# Patient Record
Sex: FEMALE | Race: WHITE | NOT HISPANIC OR LATINO | Employment: FULL TIME | URBAN - METROPOLITAN AREA
[De-identification: names, ages, dates, MRNs, and addresses within clinical notes are randomized per-mention and may not be internally consistent; named-entity substitution may affect disease eponyms.]

---

## 2017-06-27 DIAGNOSIS — J30.9 ALLERGIC RHINITIS: ICD-10-CM

## 2017-06-27 RX ORDER — FLUTICASONE PROPIONATE 50 MCG
SPRAY, SUSPENSION (ML) NASAL
Qty: 48 G | Refills: 0 | Status: SHIPPED | OUTPATIENT
Start: 2017-06-27 | End: 2018-06-20

## 2017-06-27 NOTE — TELEPHONE ENCOUNTER
30 day supply given.  Patient is due for an OV.  Please call and assist with scheduling appointment prior to next refill   Vanessa Aquino RN  [employer]

## 2017-06-27 NOTE — TELEPHONE ENCOUNTER
Flonase      Last Written Prescription Date: 4/20/2016  Last Fill Quantity: 48,  # refills: 2   Last Office Visit with [facility], [facility] or [facility] prescribing provider: 4/20/2016

## 2017-07-05 ENCOUNTER — HOSPITAL ENCOUNTER (OUTPATIENT)
Dept: MAMMOGRAPHY | Facility: CLINIC | Age: 42
Discharge: HOME | End: 2017-07-05
Attending: FAMILY MEDICINE | Admitting: FAMILY MEDICINE
Payer: COMMERCIAL

## 2017-07-05 DIAGNOSIS — Z12.31 VISIT FOR SCREENING MAMMOGRAM: ICD-10-CM

## 2017-07-05 PROCEDURE — G0202 SCR MAMMO BI INCL CAD: HCPCS

## 2017-07-06 NOTE — PROGRESS NOTES
Dear Laure,    Your recent mammogram was normal. Annual mammograms are recommended, so you will be due again in July 2018.  You may receive a separate result letter in the mail from the imaging center.    Please contact the clinic if you have additional questions.  Thank you.    Sincerely,    Fatimah Chen PA-C  Physician extender for Dr. Karen Weiler

## 2017-07-13 ENCOUNTER — OFFICE VISIT (OUTPATIENT)
Dept: FAMILY MEDICINE | Facility: CLINIC | Age: 42
End: 2017-07-13
Payer: COMMERCIAL

## 2017-07-13 VITALS
WEIGHT: 169 LBS | HEART RATE: 80 BPM | OXYGEN SATURATION: 100 % | RESPIRATION RATE: 12 BRPM | HEIGHT: 66 IN | BODY MASS INDEX: 27.16 KG/M2 | SYSTOLIC BLOOD PRESSURE: 110 MMHG | DIASTOLIC BLOOD PRESSURE: 70 MMHG | TEMPERATURE: 98.2 F

## 2017-07-13 DIAGNOSIS — N94.6 DYSMENORRHEA: ICD-10-CM

## 2017-07-13 DIAGNOSIS — G89.29 CHRONIC MIDLINE LOW BACK PAIN WITHOUT SCIATICA: ICD-10-CM

## 2017-07-13 DIAGNOSIS — M54.50 CHRONIC MIDLINE LOW BACK PAIN WITHOUT SCIATICA: ICD-10-CM

## 2017-07-13 DIAGNOSIS — Z00.00 ROUTINE GENERAL MEDICAL EXAMINATION AT A HEALTH CARE FACILITY: Primary | ICD-10-CM

## 2017-07-13 PROCEDURE — 99396 PREV VISIT EST AGE 40-64: CPT | Performed by: FAMILY MEDICINE

## 2017-07-13 RX ORDER — NAPROXEN 500 MG/1
500 TABLET ORAL 2 TIMES DAILY PRN
Qty: 60 TABLET | Refills: 1 | Status: SHIPPED | OUTPATIENT
Start: 2017-07-13 | End: 2018-07-18

## 2017-07-13 RX ORDER — CYCLOBENZAPRINE HCL 10 MG
5-10 TABLET ORAL 3 TIMES DAILY PRN
Qty: 30 TABLET | Refills: 1 | Status: SHIPPED | OUTPATIENT
Start: 2017-07-13 | End: 2018-07-18

## 2017-07-13 RX ORDER — NORETHINDRONE ACETATE AND ETHINYL ESTRADIOL 1MG-20(21)
1 KIT ORAL DAILY
Qty: 84 TABLET | Refills: 3 | Status: SHIPPED | OUTPATIENT
Start: 2017-07-13 | End: 2018-04-16

## 2017-07-13 ASSESSMENT — ANXIETY QUESTIONNAIRES
2. NOT BEING ABLE TO STOP OR CONTROL WORRYING: NOT AT ALL
5. BEING SO RESTLESS THAT IT IS HARD TO SIT STILL: NOT AT ALL
IF YOU CHECKED OFF ANY PROBLEMS ON THIS QUESTIONNAIRE, HOW DIFFICULT HAVE THESE PROBLEMS MADE IT FOR YOU TO DO YOUR WORK, TAKE CARE OF THINGS AT HOME, OR GET ALONG WITH OTHER PEOPLE: SOMEWHAT DIFFICULT
7. FEELING AFRAID AS IF SOMETHING AWFUL MIGHT HAPPEN: NOT AT ALL
1. FEELING NERVOUS, ANXIOUS, OR ON EDGE: SEVERAL DAYS
6. BECOMING EASILY ANNOYED OR IRRITABLE: SEVERAL DAYS
GAD7 TOTAL SCORE: 3
3. WORRYING TOO MUCH ABOUT DIFFERENT THINGS: NOT AT ALL

## 2017-07-13 ASSESSMENT — PATIENT HEALTH QUESTIONNAIRE - PHQ9: 5. POOR APPETITE OR OVEREATING: SEVERAL DAYS

## 2017-07-13 NOTE — MR AVS SNAPSHOT
After Visit Summary   7/13/2017    Laure Mendez    MRN: [id number]           Patient Information     Date Of Birth          1975        Visit Information        Provider Department      7/13/2017 1:20 PM Weiler, Karen, MD [facility]        Today's Diagnoses     Routine general medical examination at a health care facility    -  1    Allergic rhinitis        Dysmenorrhea        Chronic midline low back pain without sciatica          Care Instructions      Preventive Health Recommendations  Female Ages 40 to 49    Yearly exam:     See your health care provider every year in order to  1. Review health changes.   2. Discuss preventive care.    3. Review your medicines if your doctor prescribed any.      Get a Pap test every three years (unless you have an abnormal result and your provider advises testing more often).      If you get Pap tests with HPV test, you only need to test every 5 years, unless you have an abnormal result. You do not need a Pap test if your uterus was removed (hysterectomy) and you have not had cancer.      You should be tested each year for STDs (sexually transmitted diseases), if you're at risk.       Ask your doctor if you should have a mammogram.      Have a colonoscopy (test for colon cancer) if someone in your family has had colon cancer or polyps before age 50.       Have a cholesterol test every 5 years.       Have a diabetes test (fasting glucose) after age 45. If you are at risk for diabetes, you should have this test every 3 years.    Shots: Get a flu shot each year. Get a tetanus shot every 10 years.     Nutrition:     Eat at least 5 servings of fruits and vegetables each day.    Eat whole-grain bread, whole-wheat pasta and brown rice instead of white grains and rice.    Talk to your provider about Calcium and Vitamin D.     Lifestyle    Exercise at least 150 minutes a week (an average of 30 minutes a day, 5 days a week). This will help you control  "your weight and prevent disease.    Limit alcohol to one drink per day.    No smoking.     Wear sunscreen to prevent skin cancer.    See your dentist every six months for an exam and cleaning.          Follow-ups after your visit        Who to contact     If you have questions or need follow up information about today's clinic visit or your schedule please contact [facility] [place] directly at [phone].  Normal or non-critical lab and imaging results will be communicated to you by [employer], letter or phone within 4 business days after the clinic has received the results. If you do not hear from us within 7 days, please contact the clinic through [employer] or phone. If you have a critical or abnormal lab result, we will notify you by phone as soon as possible.  Submit refill requests through [employer] or call your pharmacy and they will forward the refill request to us. Please allow 3 business days for your refill to be completed.          Additional Information About Your Visit        [employer] Information     [employer] gives you secure access to your electronic health record. If you see a primary care provider, you can also send messages to your care team and make appointments. If you have questions, please call your primary care clinic.  If you do not have a primary care provider, please call [phone] and they will assist you.        Care EveryWhere ID     This is your Care EveryWhere ID. This could be used by other organizations to access your [place] medical records  [id number]        Your Vitals Were     Pulse Temperature Respirations Height Last Period Pulse Oximetry    80 98.2  F (36.8  C) (Tympanic) 12 5' 5.5\" (1.664 m) 06/29/2017 100%    Breastfeeding? BMI (Body Mass Index)                No 27.7 kg/m2           Blood Pressure from Last 3 Encounters:   07/13/17 110/70   04/20/16 114/68   01/29/16 100/80    Weight from Last 3 Encounters:   07/13/17 169 lb (76.7 kg)   04/20/16 166 lb (75.3 kg) "   01/29/16 171 lb 9.6 oz (77.8 kg)              Today, you had the following     No orders found for display         Today's Medication Changes          These changes are accurate as of: 7/13/17  2:14 PM.  If you have any questions, ask your nurse or doctor.               Start taking these medicines.        Dose/Directions    norethindrone-ethinyl estradiol 1-20 MG-MCG per tablet   Commonly known as:  JUNEL FE 1/20   Used for:  Dysmenorrhea   Started by:  Weiler, Karen, MD        Dose:  1 tablet   Take 1 tablet by mouth daily   Quantity:  84 tablet   Refills:  3            Where to get your medicines      These medications were sent to [facility] [address]  [facility] [address] [address]     Phone:  [phone]     cyclobenzaprine 10 MG tablet    naproxen 500 MG tablet    norethindrone-ethinyl estradiol 1-20 MG-MCG per tablet                Primary Care Provider Office Phone # Fax #    Karen Weiler, [phone]       [facility] [address] [facility] [address]        Equal Access to Services     [facility] AH: Hadii aad ku hadasho Soomaali, waaxda luqadaha, qaybta kaalmada adeegyada, waxay idiin hayaan aderemberto kharaaleah ladayna . So [facility] [phone].    ATENCIÓN: Si habla español, tiene a granger disposición servicios gratuitos de asistencia lingüística. Llame al [phone].    We comply with applicable federal civil rights laws and [place] laws. We do not discriminate on the basis of race, color, national origin, age, disability sex, sexual orientation or gender identity.            Thank you!     Thank you for choosing [facility]  for your care. Our goal is always to provide you with excellent care. Hearing back from our patients is one way we can continue to improve our services. Please take a few minutes to complete the written survey that you may receive in the mail after your visit with us. Thank you!              Your Updated Medication List - Protect others around you: Learn how to safely use, store and throw away your medicines at www.disposemymeds.org.          This list is accurate as of: 7/13/17  2:14 PM.  Always use your most recent med list.                   Brand Name Dispense Instructions for use Diagnosis    cyclobenzaprine 10 MG tablet    FLEXERIL    30 tablet    Take 0.5-1 tablets (5-10 mg) by mouth 3 times daily as needed for muscle spasms    Dysmenorrhea       fluticasone 50 MCG/ACT spray    FLONASE    48 g    USE TWO SPRAYS IN EACH NOSTRIL EVERY DAY    Allergic rhinitis       hydrocortisone 0.2 % cream    WESTCORT    60 g    Apply  topically 3 times daily as needed. Apply sparingly to affected area.  Do not apply to face.    Eczema       naproxen 500 MG tablet    NAPROSYN    60 tablet    Take 1 tablet (500 mg) by mouth 2 times daily as needed for moderate pain    Dysmenorrhea       norethindrone-ethinyl estradiol 1-20 MG-MCG per tablet    JUNEL FE 1/20    84 tablet    Take 1 tablet by mouth daily    Dysmenorrhea

## 2017-07-13 NOTE — NURSING NOTE
"Chief Complaint   Patient presents with     Physical       Initial /70  Pulse 80  Temp 98.2  F (36.8  C) (Tympanic)  Resp 12  Ht 5' 5.5\" (1.664 m)  Wt 169 lb (76.7 kg)  LMP 06/29/2017  SpO2 100%  Breastfeeding? No  BMI 27.7 kg/m2 Estimated body mass index is 27.7 kg/(m^2) as calculated from the following:    Height as of this encounter: 5' 5.5\" (1.664 m).    Weight as of this encounter: 169 lb (76.7 kg).  Medication Reconciliation: complete   Abigail Bloedow LPN    "

## 2017-07-13 NOTE — PROGRESS NOTES
SUBJECTIVE:   CC: Laure Mendez is an 42 year old woman who presents for preventive health visit.     Physical   Annual:     Getting at least 3 servings of Calcium per day::  Yes    Bi-annual eye exam::  Yes    Dental care twice a year::  Yes    Sleep apnea or symptoms of sleep apnea::  None    Diet::  Regular (no restrictions)    Frequency of exercise::  None    Taking medications regularly::  Yes    Medication side effects::  Not applicable    Additional concerns today::  YES    Anxiety Follow-Up    Status since last visit: No change    Other associated symptoms:None    Complicating factors:   Significant life event: Yes-  Rental property,  Current substance abuse: None  Depression symptoms: No  CRISTINA-7 SCORE 8/6/2014 8/13/2015 7/13/2017   Total Score 1 2 -   Total Score - - -   Total Score - - 3     GAD7    Today's PHQ-2 Score:   PHQ-2 ( 1999 [employer]) 7/6/2017   Q1: Little interest or pleasure in doing things 1   Q2: Feeling down, depressed or hopeless 0   PHQ-2 Score 1   Q1: Little interest or pleasure in doing things Several days   Q2: Feeling down, depressed or hopeless Not at all   PHQ-2 Score 1     Abuse: Current or Past(Physical, Sexual or Emotional)- No  Do you feel safe in your environment - Yes    Social History   Substance Use Topics     Smoking status: Never Smoker     Smokeless tobacco: Never Used     Alcohol use Yes      Comment: every day 1-2 drinks      The patient does not drink >3 drinks per day nor >7 drinks per week.    Reviewed orders with patient.  Reviewed health maintenance and updated orders accordingly - Yes    Patient under age 50, mutual decision reflected in health maintenance.      Pertinent mammograms are reviewed under the imaging tab.  History of abnormal Pap smear: NO - age 30- 65 PAP every 3 years recommended      Pt is struggling with stress and anxiety. Work is very stressful. Is still struggling with fertility issues.  No SI/HI.     Is having heavy periods.  Feels like she  "gets more emotional with her menses. Regular menses.  Wonders what options she has. Not sure if she wants definitive treatment done.          Reviewed and updated as needed this visit by clinical staff       Reviewed and updated as needed this visit by Provider         ROS:  C: NEGATIVE for fever, chills, change in weight  I: NEGATIVE for worrisome rashes, moles or lesions  E: NEGATIVE for vision changes or irritation  ENT: NEGATIVE for ear, mouth and throat problems  R: NEGATIVE for significant cough or SOB  B: NEGATIVE for masses, tenderness or discharge  CV: NEGATIVE for chest pain, palpitations or peripheral edema  GI: NEGATIVE for nausea, abdominal pain, heartburn, or change in bowel habits  : NEGATIVE for unusual urinary or vaginal symptoms. Periods are regular.  M: NEGATIVE for significant arthralgias or myalgia  N: NEGATIVE for weakness, dizziness or paresthesias  P: NEGATIVE for changes in mood or affect     OBJECTIVE:   /70  Pulse 80  Temp 98.2  F (36.8  C) (Tympanic)  Resp 12  Ht 5' 5.5\" (1.664 m)  Wt 169 lb (76.7 kg)  LMP 06/29/2017  SpO2 100%  Breastfeeding? No  BMI 27.7 kg/m2  EXAM:  GENERAL: healthy, alert and no distress  EYES: Eyes grossly normal to inspection, PERRL and conjunctivae and sclerae normal  HENT: ear canals and TM's normal, nose and mouth without ulcers or lesions  NECK: no adenopathy, no asymmetry, masses, or scars and thyroid normal to palpation  RESP: lungs clear to auscultation - no rales, rhonchi or wheezes  BREAST: normal without masses, tenderness or nipple discharge and no palpable axillary masses or adenopathy  CV: regular rate and rhythm, normal S1 S2, no S3 or S4, no murmur, click or rub, no peripheral edema and peripheral pulses strong  ABDOMEN: soft, nontender, no hepatosplenomegaly, no masses and bowel sounds normal  MS: no gross musculoskeletal defects noted, no edema  SKIN: no suspicious lesions or rashes  NEURO: Normal strength and tone, mentation " "intact and speech normal  PSYCH: mentation appears normal, affect normal/bright    ASSESSMENT/PLAN:       ICD-10-CM    1. Routine general medical examination at a health care facility Z00.00 **CBC with platelets FUTURE 6mo     **Lipid panel reflex to direct LDL FUTURE 6mo     **Basic metabolic panel FUTURE 6mo     **TSH with free T4 reflex FUTURE 6mo   2. Allergic rhinitis J30.9    3. Dysmenorrhea N94.6 naproxen (NAPROSYN) 500 MG tablet     norethindrone-ethinyl estradiol (JUNEL FE 1/20) 1-20 MG-MCG per tablet   4. Chronic midline low back pain without sciatica M54.5 cyclobenzaprine (FLEXERIL) 10 MG tablet    G89.29          COUNSELING:  Reviewed preventive health counseling, as reflected in patient instructions       Regular exercise       Healthy diet/nutrition       Contraception       Family planning     reports that she has never smoked. She has never used smokeless tobacco.    Estimated body mass index is 27.2 kg/(m^2) as calculated from the following:    Height as of 4/20/16: 1.664 m (5' 5.5\").    Weight as of 4/20/16: 75.3 kg (166 lb).   Weight management plan: Discussed healthy diet and exercise guidelines and patient will follow up in 12 months in clinic to re-evaluate.    Counseling Resources:  ATP IV Guidelines  Pooled Cohorts Equation Calculator  Breast Cancer Risk Calculator  FRAX Risk Assessment  ICSI Preventive Guidelines  Dietary Guidelines for Americans, 2010  USDA's MyPlate  ASA Prophylaxis  Lung CA Screening    Karen Weiler, MD  [facility] [place]  Answers for HPI/ROS submitted by the patient on 7/6/2017   PHQ-2 Score: 1    "

## 2017-07-14 ASSESSMENT — PATIENT HEALTH QUESTIONNAIRE - PHQ9: SUM OF ALL RESPONSES TO PHQ QUESTIONS 1-9: 4

## 2017-07-14 ASSESSMENT — ANXIETY QUESTIONNAIRES: GAD7 TOTAL SCORE: 3

## 2017-09-17 DIAGNOSIS — J30.9 ALLERGIC RHINITIS: ICD-10-CM

## 2017-09-18 RX ORDER — FLUTICASONE PROPIONATE 50 MCG
SPRAY, SUSPENSION (ML) NASAL
Qty: 48 G | Refills: 2 | Status: SHIPPED | OUTPATIENT
Start: 2017-09-18 | End: 2018-07-18

## 2017-09-18 NOTE — TELEPHONE ENCOUNTER
Prescription approved per [facility] Refill Protocol.  Geovanna Red, RN, BSN  [facility]

## 2017-09-18 NOTE — TELEPHONE ENCOUNTER
fluticasone (FLONASE) 50 MCG/ACT spray      Last Written Prescription Date: 6/27/2017  Last Fill Quantity: 48 g,  # refills: 0   Last Office Visit with [facility], [facility] or [facility] prescribing provider: 7/13/2017

## 2017-10-11 ENCOUNTER — MYC MEDICAL ADVICE (OUTPATIENT)
Dept: FAMILY MEDICINE | Facility: CLINIC | Age: 42
End: 2017-10-11

## 2017-11-02 ENCOUNTER — HOSPITAL ENCOUNTER (EMERGENCY)
Facility: CLINIC | Age: 42
Discharge: HOME | End: 2017-11-03
Attending: EMERGENCY MEDICINE | Admitting: EMERGENCY MEDICINE
Payer: COMMERCIAL
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
## 2017-11-02 DIAGNOSIS — S61.411A LACERATION OF RIGHT HAND WITHOUT FOREIGN BODY, INITIAL ENCOUNTER: ICD-10-CM

## 2017-11-02 PROCEDURE — 99284 EMERGENCY DEPT VISIT MOD MDM: CPT | Mod: 25

## 2017-11-02 PROCEDURE — 64450 NJX AA&/STRD OTHER PN/BRANCH: CPT

## 2017-11-02 RX ORDER — AMPICILLIN AND SULBACTAM 2; 1 G/1; G/1
3 INJECTION, POWDER, FOR SOLUTION INTRAMUSCULAR; INTRAVENOUS ONCE
Status: COMPLETED | OUTPATIENT
Start: 2017-11-02 | End: 2017-11-03

## 2017-11-02 ASSESSMENT — ENCOUNTER SYMPTOMS: WOUND: 1

## 2017-11-02 NOTE — ED AVS SNAPSHOT
[facility] Emergency Department    [address]    [facility] [address]    Phone:  [phone]    Fax:  [phone]                                       Laure Mendez   MRN: [id number]    Department:  [facility] Emergency Department   Date of Visit:  11/2/2017           After Visit Summary Signature Page     I have received my discharge instructions, and my questions have been answered. I have discussed any challenges I see with this plan with the nurse or doctor.    ..........................................................................................................................................  Patient/Patient Representative Signature      ..........................................................................................................................................  Patient Representative Print Name and Relationship to Patient    ..................................................               ................................................  Date                                            Time    ..........................................................................................................................................  Reviewed by Signature/Title    ...................................................              ..............................................  Date                                                            Time

## 2017-11-02 NOTE — ED AVS SNAPSHOT
[facility] Emergency Department    [address]    [facility] [address]    Phone:  [phone]    Fax:  [phone]                                       Laure Mendez   MRN: [id number]    Department:  [facility] Emergency Department   Date of Visit:  11/2/2017           Patient Information     Date Of Birth          1975        Your diagnoses for this visit were:     Dog bite, initial encounter     Laceration of right hand without foreign body, initial encounter        You were seen by Marisa Carreno MD.      Follow-up Information     Follow up with Francois Nichols MD. Schedule an appointment as soon as possible for a visit in 3 days.    Specialty:  Orthopedics    Why:  for follow-up of dog bite, call tomorrow to make appointment     Contact information:    [facility]  [address] [place] [address]  [phone]          Go to [facility] Emergency Department.    Specialty:  EMERGENCY MEDICINE    Why:  for redness, swelling, or drainage from wounds, return to ED immediately    Contact information:    [address] [facility] [address]  [phone]        Discharge Instructions         Dog Bite  A dog bite can cause a wound deep enough to break the skin. In such cases, the wound is cleaned and sometimes closed. If the wound is closed, it is usually not completely closed. This is so that fluid can drain if the wound becomes infected. Often, wounds will be left open to heal. In addition to wound care, a tetanus shot may be given, if needed.    Home care    Wash your hands well with soap and warm water before and after caring for the wound. This helps lower the risk of infection.    Care for the wound as directed. If a dressing was applied to the wound, be sure to change it as directed.    If the wound bleeds, place a clean, soft cloth on the wound. Then firmly apply pressure until the bleeding stops. This may take  up to 5 minutes. Do not release the pressure and look at the wound during this time.    Most wounds heal within 10 days. But an infection can occur even with proper treatment. So be sure to check the wound daily for signs of infection (see below).    Antibiotics may be prescribed. These help prevent or treat infection. If you re given antibiotics, take them as directed. Also be sure to complete the medicines.  Rabies prevention  Rabies is a virus that can be carried in certain animals. These can include domestic animals such as dogs and cats. Pets fully vaccinated against rabies (2 shots) are at very low risk of infection. But because human rabies is almost always fatal, any biting pet should be confined for 10 days as an extra precaution. In general, if there is a risk for rabies, the following steps may need to be taken:    If someone s pet dog has bitten you, it should be kept in a secure area for the next 10 days to watch for signs of illness. (If the pet owner won t allow this, contact your local animal control center.) If the dog becomes ill or dies during that time, contact your local animal control center at once so the animal may be tested for rabies. If the dog stays healthy for the next 10 days, there is no danger of rabies in the animal or you.    If a stray dog bit you, contact your local animal control center. They can give information on capture, quarantine, and animal rabies testing.    If you can t find the animal that bit you in the next 2 days, and if rabies exists in your area, you may need to receive the rabies vaccine series. Call your healthcare provider right away. Or, return to the emergency department promptly.    All animal bites should be reported to the local animal control center. If you were not given a form to fill out, you can report this yourself.  Follow-up care  Follow up with your healthcare provider, or as directed.  When to seek medical advice  Call your healthcare provider  right away if any of these occur:    Signs of infection:    Spreading redness or warmth from the wound    Increased pain or swelling    Fever of 100.4 F (38 C) or higher, or as directed by your healthcare provider    Colored fluid or pus draining from the wound    Signs of rabies infection:    Headache    Confusion    Strange behavior    Increased salivating and drooling    Seizure    Decreased ability to move any body part near the wound    Bleeding that can't be stopped after 5 minutes of firm pressure  Date Last Reviewed: 3/1/2017    5726-1158 [employer]. [address] [place] [address]. All rights reserved. This information is not intended as a substitute for professional medical care. Always follow your healthcare professional's instructions.          24 Hour Appointment Hotline       To make an appointment at any [facility] call [phone] ([phone]). If you don't have a family doctor or clinic, we will help you find one. [facility] are conveniently located to serve the needs of you and your family.             Review of your medicines      START taking        Dose / Directions Last dose taken    amoxicillin-clavulanate 875-125 MG per tablet   Commonly known as:  AUGMENTIN   Dose:  1 tablet   Quantity:  10 tablet        Take 1 tablet by mouth 2 times daily for 5 days   Refills:  0          Our records show that you are taking the medicines listed below. If these are incorrect, please call your family doctor or clinic.        Dose / Directions Last dose taken    cyclobenzaprine 10 MG tablet   Commonly known as:  FLEXERIL   Dose:  5-10 mg   Quantity:  30 tablet        Take 0.5-1 tablets (5-10 mg) by mouth 3 times daily as needed for muscle spasms   Refills:  1        * fluticasone 50 MCG/ACT spray   Commonly known as:  FLONASE   Quantity:  48 g        USE TWO SPRAYS IN EACH NOSTRIL EVERY DAY   Refills:  0        * fluticasone 50 MCG/ACT spray   Commonly known as:   FLONASE   Quantity:  48 g        USE TWO SPRAYS IN EACH NOSTRIL EVERY DAY   Refills:  2        hydrocortisone 0.2 % cream   Commonly known as:  WESTCORT   Quantity:  60 g        Apply  topically 3 times daily as needed. Apply sparingly to affected area.  Do not apply to face.   Refills:  3        naproxen 500 MG tablet   Commonly known as:  NAPROSYN   Dose:  500 mg   Quantity:  60 tablet        Take 1 tablet (500 mg) by mouth 2 times daily as needed for moderate pain   Refills:  1        norethindrone-ethinyl estradiol 1-20 MG-MCG per tablet   Commonly known as:  JUNEL FE 1/20   Dose:  1 tablet   Quantity:  84 tablet        Take 1 tablet by mouth daily   Refills:  3        * Notice:  This list has 2 medication(s) that are the same as other medications prescribed for you. Read the directions carefully, and ask your doctor or other care provider to review them with you.            Prescriptions were sent or printed at these locations (1 Prescription)                   Other Prescriptions                Printed at Department/Unit printer (1 of 1)         amoxicillin-clavulanate (AUGMENTIN) 875-125 MG per tablet                Procedures and tests performed during your visit     Hand XR, G/E 3 views, right      Orders Needing Specimen Collection     None      Pending Results     Date and Time Order Name Status Description    11/2/2017 2341 Hand XR, G/E 3 views, right Preliminary             Pending Culture Results     No orders found for last 3 day(s).            Pending Results Instructions     If you had any lab results that were not finalized at the time of your Discharge, you can call the ED Lab Result RN at [phone]. You will be contacted by this team for any positive Lab results or changes in treatment. The nurses are available 7 days a week from 10A to 6:30P.  You can leave a message 24 hours per day and they will return your call.        Test Results From Your Hospital Stay        11/3/2017  1:13 AM       Narrative     XR HAND RT G/E 3 VW  11/3/2017 1:07 AM      HISTORY: Dog bite, multiple lacerations, evaluate for foreign body or  fracture.    COMPARISON: None.        Impression     IMPRESSION: No acute fracture or dislocation. Air in the soft tissues  about the fifth metacarpal consistent with laceration. No radiopaque  foreign body.                Clinical Quality Measure: Blood Pressure Screening     Your blood pressure was checked while you were in the emergency department today. The last reading we obtained was  BP: (!) 130/92 . Please read the guidelines below about what these numbers mean and what you should do about them.  If your systolic blood pressure (the top number) is less than 120 and your diastolic blood pressure (the bottom number) is less than 80, then your blood pressure is normal. There is nothing more that you need to do about it.  If your systolic blood pressure (the top number) is 120-139 or your diastolic blood pressure (the bottom number) is 80-89, your blood pressure may be higher than it should be. You should have your blood pressure rechecked within a year by a primary care provider.  If your systolic blood pressure (the top number) is 140 or greater or your diastolic blood pressure (the bottom number) is 90 or greater, you may have high blood pressure. High blood pressure is treatable, but if left untreated over time it can put you at risk for heart attack, stroke, or kidney failure. You should have your blood pressure rechecked by a primary care provider within the next 4 weeks.  If your provider in the emergency department today gave you specific instructions to follow-up with your doctor or provider even sooner than that, you should follow that instruction and not wait for up to 4 weeks for your follow-up visit.        Thank you for choosing [facility]       Thank you for choosing [facility] for your care. Our goal is always to provide you with excellent care. Hearing back from our  patients is one way we can continue to improve our services. Please take a few minutes to complete the written survey that you may receive in the mail after you visit with us. Thank you!        [employer] Information     [employer] gives you secure access to your electronic health record. If you see a primary care provider, you can also send messages to your care team and make appointments. If you have questions, please call your primary care clinic.  If you do not have a primary care provider, please call [phone] and they will assist you.        Care EveryWhere ID     This is your Care EveryWhere ID. This could be used by other organizations to access your [facility] medical records  [id number]        Equal Access to Services     LUKASZ LUNA : Andree Carrasco, shauna huff, radha england, jennifer bullard. So [facility] [phone].    ATENCIÓN: Si habla español, tiene a granger disposición servicios gratuitos de asistencia lingüística. Llame al [phone].    We comply with applicable federal civil rights laws and [place] laws. We do not discriminate on the basis of race, color, national origin, age, disability, sex, sexual orientation, or gender identity.            After Visit Summary       This is your record. Keep this with you and show to your community pharmacist(s) and doctor(s) at your next visit.

## 2017-11-03 ENCOUNTER — APPOINTMENT (OUTPATIENT)
Dept: GENERAL RADIOLOGY | Facility: CLINIC | Age: 42
End: 2017-11-03
Attending: EMERGENCY MEDICINE
Payer: COMMERCIAL

## 2017-11-03 VITALS
HEIGHT: 65 IN | OXYGEN SATURATION: 100 % | BODY MASS INDEX: 27.99 KG/M2 | RESPIRATION RATE: 22 BRPM | SYSTOLIC BLOOD PRESSURE: 130 MMHG | DIASTOLIC BLOOD PRESSURE: 92 MMHG | WEIGHT: 168 LBS | TEMPERATURE: 98.3 F

## 2017-11-03 PROCEDURE — 73130 X-RAY EXAM OF HAND: CPT | Mod: RT

## 2017-11-03 PROCEDURE — 25000128 H RX IP 250 OP 636: Performed by: EMERGENCY MEDICINE

## 2017-11-03 PROCEDURE — 96365 THER/PROPH/DIAG IV INF INIT: CPT

## 2017-11-03 RX ADMIN — AMPICILLIN SODIUM AND SULBACTAM SODIUM 3 G: 2; 1 INJECTION, POWDER, FOR SOLUTION INTRAMUSCULAR; INTRAVENOUS at 00:26

## 2017-11-03 ASSESSMENT — ENCOUNTER SYMPTOMS
FEVER: 0
NUMBNESS: 0
WEAKNESS: 0

## 2017-11-03 NOTE — DISCHARGE INSTRUCTIONS
Dog Bite  A dog bite can cause a wound deep enough to break the skin. In such cases, the wound is cleaned and sometimes closed. If the wound is closed, it is usually not completely closed. This is so that fluid can drain if the wound becomes infected. Often, wounds will be left open to heal. In addition to wound care, a tetanus shot may be given, if needed.    Home care    Wash your hands well with soap and warm water before and after caring for the wound. This helps lower the risk of infection.    Care for the wound as directed. If a dressing was applied to the wound, be sure to change it as directed.    If the wound bleeds, place a clean, soft cloth on the wound. Then firmly apply pressure until the bleeding stops. This may take up to 5 minutes. Do not release the pressure and look at the wound during this time.    Most wounds heal within 10 days. But an infection can occur even with proper treatment. So be sure to check the wound daily for signs of infection (see below).    Antibiotics may be prescribed. These help prevent or treat infection. If you re given antibiotics, take them as directed. Also be sure to complete the medicines.  Rabies prevention  Rabies is a virus that can be carried in certain animals. These can include domestic animals such as dogs and cats. Pets fully vaccinated against rabies (2 shots) are at very low risk of infection. But because human rabies is almost always fatal, any biting pet should be confined for 10 days as an extra precaution. In general, if there is a risk for rabies, the following steps may need to be taken:    If someone s pet dog has bitten you, it should be kept in a secure area for the next 10 days to watch for signs of illness. (If the pet owner won t allow this, contact your local animal control center.) If the dog becomes ill or dies during that time, contact your local animal control center at once so the animal may be tested for rabies. If the dog stays healthy  for the next 10 days, there is no danger of rabies in the animal or you.    If a stray dog bit you, contact your local animal control center. They can give information on capture, quarantine, and animal rabies testing.    If you can t find the animal that bit you in the next 2 days, and if rabies exists in your area, you may need to receive the rabies vaccine series. Call your healthcare provider right away. Or, return to the emergency department promptly.    All animal bites should be reported to the local animal control center. If you were not given a form to fill out, you can report this yourself.  Follow-up care  Follow up with your healthcare provider, or as directed.  When to seek medical advice  Call your healthcare provider right away if any of these occur:    Signs of infection:    Spreading redness or warmth from the wound    Increased pain or swelling    Fever of 100.4 F (38 C) or higher, or as directed by your healthcare provider    Colored fluid or pus draining from the wound    Signs of rabies infection:    Headache    Confusion    Strange behavior    Increased salivating and drooling    Seizure    Decreased ability to move any body part near the wound    Bleeding that can't be stopped after 5 minutes of firm pressure  Date Last Reviewed: 3/1/2017    2884-4187 [employer]. [address] [place] [address], [place] [address]. All rights reserved. This information is not intended as a substitute for professional medical care. Always follow your healthcare professional's instructions.

## 2017-11-03 NOTE — ED PROVIDER NOTES
"  History     Chief Complaint:  Dog Bite      HPI   Laure Mendez is a 42 year old female who presents to the emergency department for evaluation of a dog bite to the right hand. The patient states that her dog was just coming in from outside when it bit her right hand twice and then shook. She says this happened around 2215 and her main concern is a fracture and to have the wound washed out. She says that this has happened before with this dog and that the dog is immunized against rabies.  Patients last tetanus was 2013.  She denies numbness and weakness.      Allergies:  Bactrim    Medications:    fluticasone (FLONASE) 50 MCG/ACT spray  norethindrone-ethinyl estradiol (JUNEL FE 1/20) 1-20 MG-MCG per tablet  fluticasone (FLONASE) 50 MCG/ACT spray  naproxen (NAPROSYN) 500 MG tablet  cyclobenzaprine (FLEXERIL) 10 MG tablet  hydrocortisone (WESTCORT) 0.2 % cream    Past Medical History:    Acute laryngitis  Allergic rhinitis  Anxiety  ASCUS on pap smear      Past Surgical History:    [place] teeth  Cervical lymph node bx-related to tonsils  Tonsillectomy  Colposcopy s/p ASCUS  Mouth surgery  Soft tissue surgery      Family History:    Father- hypertension, asthma  Paternal grandfather- PE  Sister- asthma, multi ASCUS papules      Social History:  Marital Status:   [2]  Social History   Substance Use Topics     Smoking status: Never Smoker     Smokeless tobacco: Never Used     Alcohol use Yes      Comment: every day 1-2 drinks         Review of Systems   Constitutional: Negative for fever.   Skin: Positive for wound.   Neurological: Negative for weakness and numbness.   All other systems reviewed and are negative.    Physical Exam   First Vitals:  BP: (!) 130/92  Heart Rate: 63  Temp: 98.3  F (36.8  C)  Resp: 22  Height: 165.1 cm (5' 5\")  Weight: 76.2 kg (168 lb)  SpO2: 100 %      Physical Exam  Constitutional: Alert, attentive, GCS 15, middle aged woman with right hand in dressing  HENT:    Nose: Nose normal. "    Mouth/Throat: Oropharynx is clear, mucous membranes are moist   Eyes: Normal conjunctiva.   CV: regular rate and rhythm  Chest: Effort normal and breath sounds normal.   GI:  There is no tenderness  MSK: able to flex and extend all fingers in the right hand, full range of motion of right wrist, radial pulse 2+  Neurological: Alert, attentive, sensation intact in right hand   Skin: Skin is warm and dry. Right hand: 11 mm laceration/puncture below right thumb on dorsal surface with gapping, 9 mm laceration and 4 mm laceration on the dorsal surface of hand along the 5th metacarpal, superficial lacerations along edges of 3rd right phalanx      Emergency Department Course     Imaging:  Radiology findings were communicated with the patient who voiced understanding of the findings.    Hand XR, G/E 3 views, right   Preliminary Result   IMPRESSION: No acute fracture or dislocation. Air in the soft tissues   about the fifth metacarpal consistent with laceration. No radiopaque   foreign body.          Procedures:     Laceration/irrigation Repair      LACERATION:  Multiple lacerations to dorsum of hand, 11 mm, 9 mm, 4 mm as described above.    LOCATION:  [place] right hand     FUNCTION:  Distally sensation, motor and tendon function are intact.    ANESTHESIA:  Local using 1% lidocaine total of 3 mLs.    PREPARATION:  Copious irrigation with Normal Saline (1 L)    DEBRIDEMENT:  no debridement.      CLOSURE:  11 mm laceration was gapping and re-approximated with single steri strip.  Patient tolerated the procedure well.  The other lacerations were left completely open as there was good wound approximation and they are at high risk for infection.    Interventions:  0026 Unasyn 3 g IV    Emergency Department Course:  Nursing notes and vitals reviewed.  I performed an exam of the patient as documented above.   I discussed the treatment plan with the patient. They expressed understanding of this plan and consented to discharge.  They will be discharged home with instructions for care and follow up. In addition, the patient will return to the emergency department if their symptoms persist, worsen, if new symptoms arise or if there is any concern.  All questions were answered.     I personally reviewed the imaging results with the patient and answered all related questions prior to discharge.  Impression & Plan      Medical Decision Makin-year-old female with dog bite wound to the right hand.  There are three puncture/lacerations on the dorsum of the right hand, which were copiously irrigated as above.  There are also multiple superficial lacerations and scrapes on the sides of her fingers.  These were also irrigated.  There was gapping of an 11 mm wound, so it was reapproximated with a single Steri-Strip.  This should provide better reapproximation, but still be able to drain if there is infection that develops.  She was given a dose of IV Unasyn, for antibiotic prophylaxis given the location of the wound in her hand.  X-ray does not show any fractures or foreign bodies.  Tetanus is up to date.  The dog was her pet and is fully immunized against rabies, so rabies prophylaxis is not indicatted.  Patient will be discharged with follow-up with Dr. Nichols from hand surgery.  I discussed importance of Augmentin for antibiotic prophylaxis given the dog bite wound and risk for infection.  I discussed return precautions for infection with the patient.  All questions were answered.    Diagnosis:    ICD-10-CM    1. Dog bite, initial encounter W54.0XXA    2. Laceration of right hand without foreign body, initial encounter S61.411A      Disposition:   Discharged    Discharge Medications:  New Prescriptions    AMOXICILLIN-CLAVULANATE (AUGMENTIN) 875-125 MG PER TABLET    Take 1 tablet by mouth 2 times daily for 5 days       Scribe Disclosure:  Angus SANCHEZ, am serving as a scribe at 11:47 PM on 2017 to document services personally  performed by Marisa Carreno MD, based on my observations and the provider's statements to me.     [facility] EMERGENCY DEPARTMENT       Marisa Carreno MD  11/03/17 0135

## 2017-11-03 NOTE — ED NOTES
Patient alert and oriented times 3 .  Abc intact got bite by her own dog at 2215. TD on tetanus. Right hand lac/ puncture

## 2018-04-16 DIAGNOSIS — N94.6 DYSMENORRHEA: ICD-10-CM

## 2018-04-16 RX ORDER — NORETHINDRONE ACETATE/ETHINYL ESTRADIOL AND FERROUS FUMARATE 1MG-20(21)
KIT ORAL
Qty: 84 TABLET | Refills: 0 | Status: SHIPPED | OUTPATIENT
Start: 2018-04-16 | End: 2018-06-20

## 2018-04-16 NOTE — TELEPHONE ENCOUNTER
Medication is being filled for 1 time refill only due to:  Patient needs to be seen because will be due for office visit in July and needs to establish care.   Geovanna Red, RN, BSN  [facility]

## 2018-04-16 NOTE — TELEPHONE ENCOUNTER
"Requested Prescriptions   Pending Prescriptions Disp Refills     HANANE FE 1/20 1-20 MG-MCG per tablet [Pharmacy Med Name: HANANE FE 1/20 1-20MG-MCG TABS]  Medication may not be due for a refill.  Last Prescribed:  norethindrone-ethinyl estradiol (JUNEL FE 1/20) 1-20 MG-MCG per tablet  Last Written Prescription Date:  7/13/2017  Last Fill Quantity: 84 tablet,  # refills: 3   Last office visit: 7/13/2017 with prescribing provider:  Weiler   Future Office Visit:       84 tablet 3     Sig: TAKE ONE TABLET BY MOUTH EVERY DAY    Contraceptives Protocol Passed    4/16/2018  6:44 AM       Passed - Patient is not a current smoker if age is 35 or older       Passed - Recent (12 mo) or future (30 days) visit within the authorizing provider's specialty    Patient had office visit in the last 12 months or has a visit in the next 30 days with authorizing provider or within the authorizing provider's specialty.  See \"Patient Info\" tab in inbasket, or \"Choose Columns\" in Meds & Orders section of the refill encounter.           Passed - No active pregnancy on record       Passed - No positive pregnancy test in past 12 months          "

## 2018-05-19 ENCOUNTER — TELEPHONE (OUTPATIENT)
Dept: FAMILY MEDICINE | Facility: CLINIC | Age: 43
End: 2018-05-19

## 2018-05-19 DIAGNOSIS — Z13.6 CARDIOVASCULAR SCREENING; LDL GOAL LESS THAN 160: ICD-10-CM

## 2018-05-19 DIAGNOSIS — Z11.4 SCREENING FOR HUMAN IMMUNODEFICIENCY VIRUS: Primary | ICD-10-CM

## 2018-05-19 NOTE — TELEPHONE ENCOUNTER
This patient is scheduled for a physical on 6/20/2018 with fasting labs scheduled for to be scheduled.  Labs have been pended.  Provider, please review orders, attach dx and add any additional labs needed.  *Pernote from pt, her cholesterol was borderline the last time it was checked.  She would like her lipids checked so that order was pended as well.  Thank you.  Tracie Dalton

## 2018-06-20 ENCOUNTER — HOSPITAL ENCOUNTER (OUTPATIENT)
Dept: LAB | Facility: CLINIC | Age: 43
Discharge: HOME | End: 2018-06-20
Attending: INTERNAL MEDICINE | Admitting: FAMILY MEDICINE
Payer: COMMERCIAL

## 2018-06-20 DIAGNOSIS — Z13.6 CARDIOVASCULAR SCREENING; LDL GOAL LESS THAN 160: ICD-10-CM

## 2018-06-20 DIAGNOSIS — J30.9 ALLERGIC RHINITIS: ICD-10-CM

## 2018-06-20 DIAGNOSIS — N94.6 DYSMENORRHEA: ICD-10-CM

## 2018-06-20 DIAGNOSIS — Z11.4 SCREENING FOR HUMAN IMMUNODEFICIENCY VIRUS: ICD-10-CM

## 2018-06-20 LAB
CHOLEST SERPL-MCNC: 252 MG/DL
HDLC SERPL-MCNC: 78 MG/DL
HIV 1+2 AB+HIV1 P24 AG SERPL QL IA: NONREACTIVE
LDLC SERPL CALC-MCNC: 156 MG/DL
NONHDLC SERPL-MCNC: 174 MG/DL
TRIGL SERPL-MCNC: 88 MG/DL

## 2018-06-20 PROCEDURE — 36415 COLL VENOUS BLD VENIPUNCTURE: CPT | Performed by: FAMILY MEDICINE

## 2018-06-20 PROCEDURE — 87389 HIV-1 AG W/HIV-1&-2 AB AG IA: CPT | Performed by: FAMILY MEDICINE

## 2018-06-20 PROCEDURE — 80061 LIPID PANEL: CPT | Performed by: FAMILY MEDICINE

## 2018-06-20 NOTE — TELEPHONE ENCOUNTER
"  Requested Prescriptions   Pending Prescriptions Disp Refills     fluticasone (FLONASE) 50 MCG/ACT spray  Last Written Prescription Date:  9/18/2017  Last Fill Quantity: 48 g,  # refills: 2   Last office visit: 7/13/2017 with prescribing provider:  Weiler     Future Office Visit:   Next 5 appointments (look out 90 days)     Jul 18, 2018 11:20 AM CDT   [facility] with Fide Antonio PA-C   [facility]    [address]  [facility] [address] [phone]                  48 g 0    Inhaled Steroids Protocol Passed    6/20/2018  1:28 PM       Passed - Patient is age 12 or older       Passed - Recent (12 mo) or future (30 days) visit within the authorizing provider's specialty    Patient had office visit in the last 12 months or has a visit in the next 30 days with authorizing provider or within the authorizing provider's specialty.  See \"Patient Info\" tab in inbasket, or \"Choose Columns\" in Meds & Orders section of the refill encounter.                      norethindrone-ethinyl estradiol (HANANE FE 1/20) 1-20 MG-MCG per tablet  Last Written Prescription Date:  4/16/2018  Last Fill Quantity: 84 tablet,  # refills: 0   Last office visit: 7/13/2017 with prescribing provider:  Weiler     Future Office Visit:   Next 5 appointments (look out 90 days)     Jul 18, 2018 11:20 AM CDT   PHYSICAL with Fide Antonio PA-C   [facility]    [address] [facility] [address] [phone]                  84 tablet 0     Sig: Take 1 tablet by mouth daily    Contraceptives Protocol Passed    6/20/2018  1:28 PM       Passed - Patient is not a current smoker if age is 35 or older       Passed - Recent (12 mo) or future (30 days) visit within the authorizing provider's specialty    Patient had office visit in the last 12 months or has a visit in the next 30 days with authorizing provider or within the authorizing provider's " "specialty.  See \"Patient Info\" tab in inbasket, or \"Choose Columns\" in Meds & Orders section of the refill encounter.           Passed - No active pregnancy on record       Passed - No positive pregnancy test in past 12 months          "

## 2018-06-20 NOTE — TELEPHONE ENCOUNTER
Reason for Call:  Medication or medication refill:    Do you use a "[facility]?  Name of the pharmacy and phone number for the current request:  [facility] [address] ([place]) - [phone]    Name of the medication requested: Flonase, & birth control    Other request: Pt came into the clinic today and was supposed to have a physical--however, one of the MA's called her and said that the physical was scheduled too early. So the pt had to reschedule for 07/16. However, the pt needs a refill on her flonase as well as her birth control ( which I did not find on her medication chart) Please give pt a call in regards to this.    Can we leave a detailed message on this number? YES    Phone number patient can be reached at: Cell number: [phone]    Best Time:     Call taken on 6/20/2018 at 11:27 AM by Comfort Zuleta

## 2018-06-20 NOTE — PROGRESS NOTES
Ms. Mendez,    -Cholesterol levels (LDL,HDL, Triglycerides) are normal. ADVISE: rechecking in 5 years.  -The 10-year ASCVD risk score (Stanford EMILIE Jr, et al., 2013) is: 0.6%    Values used to calculate the score:      Age: 43 years      Sex: Female      Is Non- : No      Diabetic: No      Tobacco smoker: No      Systolic Blood Pressure: 130 mmHg      Is BP treated: No      HDL Cholesterol: 78 mg/dL      Total Cholesterol: 252 mg/dL     If you have further questions about the interpretation of your labs, [email or web address] is a good website to check out for further information.    Please contact the clinic if you have additional questions.  Thank you.    Sincerely,    Preston Mejia MD

## 2018-06-21 RX ORDER — NORETHINDRONE ACETATE AND ETHINYL ESTRADIOL 1MG-20(21)
1 KIT ORAL DAILY
Qty: 28 TABLET | Refills: 0 | Status: SHIPPED | OUTPATIENT
Start: 2018-06-21 | End: 2018-07-18

## 2018-06-21 RX ORDER — FLUTICASONE PROPIONATE 50 MCG
SPRAY, SUSPENSION (ML) NASAL
Qty: 16 G | Refills: 0 | Status: SHIPPED | OUTPATIENT
Start: 2018-06-21 | End: 2018-07-18

## 2018-06-21 NOTE — PROGRESS NOTES
Ms. Mendez,    -HIV test is negative.    If you have further questions about the interpretation of your labs, [email or web address] is a good website to check out for further information.    Please contact the clinic if you have additional questions.  Thank you.    Sincerely,    Preston Mejia MD

## 2018-06-21 NOTE — TELEPHONE ENCOUNTER
Medication is being filled for 1 time refill only due to:  Patient needs to be seen because needs to establish care. Further refills can be addressed at upcoming office visit.   Geovanna Red RN, BSN  [facility]

## 2018-07-18 ENCOUNTER — OFFICE VISIT (OUTPATIENT)
Dept: FAMILY MEDICINE | Facility: CLINIC | Age: 43
End: 2018-07-18
Payer: COMMERCIAL

## 2018-07-18 VITALS
SYSTOLIC BLOOD PRESSURE: 128 MMHG | HEIGHT: 65 IN | OXYGEN SATURATION: 99 % | HEART RATE: 89 BPM | WEIGHT: 165 LBS | DIASTOLIC BLOOD PRESSURE: 80 MMHG | TEMPERATURE: 98.6 F | BODY MASS INDEX: 27.49 KG/M2

## 2018-07-18 DIAGNOSIS — N94.6 DYSMENORRHEA: ICD-10-CM

## 2018-07-18 DIAGNOSIS — E78.00 ELEVATED LDL CHOLESTEROL LEVEL: ICD-10-CM

## 2018-07-18 DIAGNOSIS — Z00.01 ENCOUNTER FOR ROUTINE ADULT HEALTH EXAMINATION WITH ABNORMAL FINDINGS: Primary | ICD-10-CM

## 2018-07-18 DIAGNOSIS — F41.9 ANXIETY: ICD-10-CM

## 2018-07-18 DIAGNOSIS — J30.89 CHRONIC NON-SEASONAL ALLERGIC RHINITIS, UNSPECIFIED TRIGGER: ICD-10-CM

## 2018-07-18 DIAGNOSIS — E66.3 OVERWEIGHT (BMI 25.0-29.9): ICD-10-CM

## 2018-07-18 PROCEDURE — 99396 PREV VISIT EST AGE 40-64: CPT | Performed by: PHYSICIAN ASSISTANT

## 2018-07-18 RX ORDER — FLUTICASONE PROPIONATE 50 MCG
SPRAY, SUSPENSION (ML) NASAL
Qty: 48 G | Refills: 2 | Status: SHIPPED | OUTPATIENT
Start: 2018-07-18 | End: 2021-02-08

## 2018-07-18 RX ORDER — NORETHINDRONE ACETATE AND ETHINYL ESTRADIOL 1MG-20(21)
1 KIT ORAL DAILY
Qty: 91 TABLET | Refills: 3 | Status: SHIPPED | OUTPATIENT
Start: 2018-07-18 | End: 2019-09-09

## 2018-07-18 NOTE — PROGRESS NOTES
"   SUBJECTIVE:   CC: Laure Mendez is an 43 year old woman who presents for preventive health visit.     Answers for HPI/ROS submitted by the patient on 7/15/2018   Annual Exam:  Getting at least 3 servings of Calcium per day:: Yes  Bi-annual eye exam:: Yes  Dental care twice a year:: Yes  Sleep apnea or symptoms of sleep apnea:: None  Diet:: Other  Frequency of exercise:: 1 day/week  Taking medications regularly:: Yes  Medication side effects:: Other  Additional concerns today:: No  PHQ-2 Score: 0  Duration of exercise:: 30-45 minutes      Had fasting labs prior to appt and would like to review:  Results for orders placed or performed during the hospital encounter of 06/20/18   **HIV Antigen Antibody Combo FUTURE anytime   Result Value Ref Range    HIV Antigen Antibody Combo Nonreactive NR^Nonreactive       Lipid panel reflex to direct LDL Fasting   Result Value Ref Range    Cholesterol 252 (H) <200 mg/dL    Triglycerides 88 <150 mg/dL    HDL Cholesterol 78 >49 mg/dL    LDL Cholesterol Calculated 156 (H) <100 mg/dL    Non HDL Cholesterol 174 (H) <130 mg/dL   A bit worse from last year, but guidelines do not recommend medication at this time.     The 10-year ASCVD risk score (Leanne DC Jr, et al., 2013) is: 0.5%    Values used to calculate the score:      Age: 43 years      Sex: Female      Is Non- : No      Diabetic: No      Tobacco smoker: No      Systolic Blood Pressure: 128 mmHg      Is BP treated: No      HDL Cholesterol: 78 mg/dL      Total Cholesterol: 252 mg/dL    Trying to be healty - is on the \"ketodiet\" right now.  Is aware this can impact her cholesterol levels.  Would like to have cholesterol checked again in 9 months for stability, but then states she may as well wait until next year. Offered future orders, but she will let me know if she wishes to pursue this.  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     2) Refill meds  -Started OCPs last year. Was given this " "as continuous dosing for 4 periods per year.  Did this for contraception, but also to help with mood.   Had been on prozac in the past - feels this was situational though as struggled with fertility before and never was able to conceive.   States that has felt much better since finally \"accepting\" that she cannot have children so mood has since improved. Does not feel she needs medication at this time, but I encouraged her to follow-up should anything change.  LMP: started today.  No concerns for STD.    -Flonase for allergies - has year round allergies. Very helpful for her. Primary sx including watery eyes, sneezing, nasal congestion, rhinorrhea. Takes zyrtec daily as well.       Today's PHQ-2 Score:   PHQ-2 ( 1999 Pfizer) 7/15/2018 6/20/2018   Q1: Little interest or pleasure in doing things 0 0   Q2: Feeling down, depressed or hopeless 0 0   PHQ-2 Score 0 0   Q1: Little interest or pleasure in doing things Not at all -   Q2: Feeling down, depressed or hopeless Not at all -   PHQ-2 Score 0 -       Abuse: Current or Past(Physical, Sexual or Emotional)- No  Do you feel safe in your environment - Yes    Social History   Substance Use Topics     Smoking status: Never Smoker     Smokeless tobacco: Never Used     Alcohol use Yes      Comment: every day 1-2 drinks      If you drink alcohol do you typically have >3 drinks per day or >7 drinks per week? No                     Reviewed orders with patient.  Reviewed health maintenance and updated orders accordingly - Yes  BP Readings from Last 3 Encounters:   07/18/18 128/80   11/02/17 (!) 130/92   07/13/17 110/70    Wt Readings from Last 3 Encounters:   07/18/18 165 lb (74.8 kg)   11/02/17 168 lb (76.2 kg)   07/13/17 169 lb (76.7 kg)                  Patient Active Problem List   Diagnosis     CARDIOVASCULAR SCREENING; LDL GOAL LESS THAN 160     Anxiety     Elevated LDL cholesterol level     Chronic non-seasonal allergic rhinitis, unspecified trigger     Overweight (BMI " 25.0-29.9)     Past Surgical History:   Procedure Laterality Date     C NONSPECIFIC PROCEDURE      wisdom teeth     C NONSPECIFIC PROCEDURE      cervical Lymph node bx-related to tonsils     C NONSPECIFIC PROCEDURE      Tonsillectomy     C NONSPECIFIC PROCEDURE  2000    colposcopy s/p ASCUS     MOUTH SURGERY       SOFT TISSUE SURGERY         Social History   Substance Use Topics     Smoking status: Never Smoker     Smokeless tobacco: Never Used     Alcohol use Yes      Comment: every day 1-2 drinks      Family History   Problem Relation Age of Onset     Lipids Mother      Hypertension Father      Asthma Father      Allergies Father      Asthma     Allergies Sister      excercise induced asthma     Cardiovascular Paternal Grandfather      PE     Coronary Artery Disease Maternal Grandfather      multiple MI     Genitourinary Problems Sister      multi ASCUS paps     Breast Cancer Paternal Aunt 80     Breast Cancer Paternal Aunt 80     Diabetes No family hx of      Colon Cancer No family hx of          Current Outpatient Prescriptions   Medication Sig Dispense Refill     fluticasone (FLONASE) 50 MCG/ACT spray USE TWO SPRAYS IN EACH NOSTRIL EVERY DAY 48 g 2     norethindrone-ethinyl estradiol (HANANE FE 1/20) 1-20 MG-MCG per tablet Take 1 tablet by mouth daily Continuously for 3 months 91 tablet 3     [DISCONTINUED] norethindrone-ethinyl estradiol (HANANE FE 1/20) 1-20 MG-MCG per tablet Take 1 tablet by mouth daily 28 tablet 0     Allergies   Allergen Reactions     Bactrim [Sulfamethoxazole W-Trimethoprim]        Patient under age 50, mutual decision reflected in health maintenance.      Pertinent mammograms are reviewed under the imaging tab.  History of abnormal Pap smear: NO - age 30- 65 PAP every 3 years recommended  PAP / HPV Latest Ref Rng & Units 4/20/2016 7/18/2013 10/25/2006   PAP - NIL NIL NIL   HPV 16 DNA NEG Negative - -   HPV 18 DNA NEG Negative - -   OTHER HR HPV NEG Negative - -     Reviewed and updated as  "needed this visit by clinical staff  Tobacco  Allergies  Meds  Med Hx  Surg Hx  Fam Hx  Soc Hx        Reviewed and updated as needed this visit by Provider  Tobacco  Allergies  Meds  Med Hx  Surg Hx  Fam Hx  Soc Hx           ROS:  CONSTITUTIONAL: NEGATIVE for fever, chills, change in weight  INTEGUMENTARU/SKIN: NEGATIVE for worrisome rashes, moles or lesions  EYES: NEGATIVE for vision changes or irritation  ENT: + for seasonal allergy sx. See HPI. NEGATIVE for ear, mouth and throat problems  RESP: NEGATIVE for significant cough or SOB  BREAST: NEGATIVE for masses, tenderness or discharge  CV: NEGATIVE for chest pain, palpitations or peripheral edema  GI: NEGATIVE for nausea, abdominal pain, heartburn, or change in bowel habits  : NEGATIVE for unusual urinary or vaginal symptoms. Periods are only every 3 months on continuous dosing OCPs.  MUSCULOSKELETAL: NEGATIVE for significant arthralgias or myalgia  NEURO: NEGATIVE for weakness, dizziness or paresthesias  PSYCHIATRIC: + for hx of anxiety - see HPI. NEGATIVE for changes in mood or affect    OBJECTIVE:   /80 (BP Location: Right arm, Cuff Size: Adult Regular)  Pulse 89  Temp 98.6  F (37  C) (Oral)  Ht 5' 5\" (1.651 m)  Wt 165 lb (74.8 kg)  SpO2 99%  BMI 27.46 kg/m2  EXAM:  GENERAL: healthy, alert and no distress  EYES: Eyes grossly normal to inspection, PERRL and conjunctivae and sclerae normal  HENT: ear canals and TM's normal, nose and mouth without ulcers or lesions  NECK: no adenopathy, no asymmetry, masses, or scars and thyroid normal to palpation  RESP: lungs clear to auscultation - no rales, rhonchi or wheezes  BREAST: normal without masses, tenderness or nipple discharge and no palpable axillary masses or adenopathy  CV: regular rate and rhythm, normal S1 S2, no S3 or S4, no murmur, click or rub, no peripheral edema and peripheral pulses strong  ABDOMEN: soft, nontender, no hepatosplenomegaly, no masses and bowel sounds normal  MS: " "no gross musculoskeletal defects noted, no edema  SKIN: no suspicious lesions or rashes  NEURO: Normal strength and tone, mentation intact and speech normal  PSYCH: mentation appears normal, affect normal/bright    Diagnostic Test Results:  Labs reviewed as noted above in HPI.     ASSESSMENT/PLAN:       ICD-10-CM    1. Encounter for routine adult health examination with abnormal findings Z00.01    2. Elevated LDL cholesterol level E78.00    3. Dysmenorrhea N94.6 norethindrone-ethinyl estradiol (HANANE FE 1/20) 1-20 MG-MCG per tablet   4. Chronic non-seasonal allergic rhinitis, unspecified trigger J30.89 fluticasone (FLONASE) 50 MCG/ACT spray   5. Overweight (BMI 25.0-29.9) E66.3    6. Anxiety - situational due to fertility issues. Denies current concerns or need for medication. F41.9    Ok to continue continuous dosing OCPs for dysmenorrhea - stable. Refilled for 1 yr.  Pt mentioned situational anxiety related to fertility issues in the past, but now reports she has accepted she cannot have kids. Doesn't feel she needs medication at this time. Encouraged her to follow-up anytime should things change.  Reviewed labs - cholesterol did worsen a bit from last year. Unclear if this is related to her Keto-diet. I let her know this is not well known to me, but that I could refer her to the nutritionist to discuss further if she wished. I did encourage her to make healthy sustainable life-style changes since sometimes a \"diet\" does not actually promote long-standing improvement. 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 She will let me know if wishes to see a nutritionist.    COUNSELING:   Reviewed preventive health counseling, as reflected in patient instructions       Regular exercise       Healthy diet/nutrition       Contraception    BP Readings from Last 1 Encounters:   07/18/18 128/80     Estimated body mass index is 27.46 kg/(m^2) as calculated from the following:    Height as of this encounter: 5' 5\" (1.651 " m).    Weight as of this encounter: 165 lb (74.8 kg).    BP Screening:   Last 3 BP Readings:    BP Readings from Last 3 Encounters:   07/18/18 128/80   11/02/17 (!) 130/92   07/13/17 110/70       The following was recommended to the patient:  Re-screen BP within a year and recommended lifestyle modifications  Weight management plan: Discussed healthy diet and exercise guidelines and patient will follow up in 12 months in clinic to re-evaluate.     reports that she has never smoked. She has never used smokeless tobacco.      Counseling Resources:  ATP IV Guidelines  Pooled Cohorts Equation Calculator  Breast Cancer Risk Calculator  FRAX Risk Assessment  ICSI Preventive Guidelines  Dietary Guidelines for Americans, 2010  USDA's MyPlate  ASA Prophylaxis  Lung CA Screening    Fide Antonio PA-C  [facility]

## 2018-07-18 NOTE — MR AVS SNAPSHOT
After Visit Summary   7/18/2018    Laure Mendez    MRN: [id number]           Patient Information     Date Of Birth          1975        Visit Information        Provider Department      7/18/2018 11:20 AM Fide Antonio PA-C [facility]        Today's Diagnoses     Elevated LDL cholesterol level    -  1    Dysmenorrhea        Chronic non-seasonal allergic rhinitis, unspecified trigger          Care Instructions      Preventive Health Recommendations  Female Ages 40 to 49    Yearly exam:     See your health care provider every year in order to  1. Review health changes.   2. Discuss preventive care.    3. Review your medicines if your doctor prescribed any.      Get a Pap test every three years (unless you have an abnormal result and your provider advises testing more often).      If you get Pap tests with HPV test, you only need to test every 5 years, unless you have an abnormal result. You do not need a Pap test if your uterus was removed (hysterectomy) and you have not had cancer.      You should be tested each year for STDs (sexually transmitted diseases), if you're at risk.     Ask your doctor if you should have a mammogram.      Have a colonoscopy (test for colon cancer) if someone in your family has had colon cancer or polyps before age 50.       Have a cholesterol test every 5 years.       Have a diabetes test (fasting glucose) after age 45. If you are at risk for diabetes, you should have this test every 3 years.    Shots: Get a flu shot each year. Get a tetanus shot every 10 years.     Nutrition:     Eat at least 5 servings of fruits and vegetables each day.    Eat whole-grain bread, whole-wheat pasta and brown rice instead of white grains and rice.    Get adequate Calcium and Vitamin D.      Lifestyle    Exercise at least 150 minutes a week (an average of 30 minutes a day, 5 days a week). This will help you control your weight and prevent disease.    Limit  "alcohol to one drink per day.    No smoking.     Wear sunscreen to prevent skin cancer.    See your dentist every six months for an exam and cleaning.          Follow-ups after your visit        Follow-up notes from your care team     Return in about 1 year (around 7/18/2019) for Routine Visit.      Who to contact     If you have questions or need follow up information about today's clinic visit or your schedule please contact [facility] directly at [phone].  Normal or non-critical lab and imaging results will be communicated to you by Vehconhart, letter or phone within 4 business days after the clinic has received the results. If you do not hear from us within 7 days, please contact the clinic through PureSenset or phone. If you have a critical or abnormal lab result, we will notify you by phone as soon as possible.  Submit refill requests through Reppler or call your pharmacy and they will forward the refill request to us. Please allow 3 business days for your refill to be completed.          Additional Information About Your Visit        VehconharGMEX Information     Reppler gives you secure access to your electronic health record. If you see a primary care provider, you can also send messages to your care team and make appointments. If you have questions, please call your primary care clinic.  If you do not have a primary care provider, please call [phone] and they will assist you.        Care EveryWhere ID     This is your Care EveryWhere ID. This could be used by other organizations to access your [facility] medical records  [id number]        Your Vitals Were     Pulse Temperature Height Pulse Oximetry BMI (Body Mass Index)       89 98.6  F (37  C) (Oral) 5' 5\" (1.651 m) 99% 27.46 kg/m2        Blood Pressure from Last 3 Encounters:   07/18/18 128/80   11/02/17 (!) 130/92   07/13/17 110/70    Weight from Last 3 Encounters:   07/18/18 165 lb (74.8 kg)   11/02/17 168 lb (76.2 kg)   07/13/17 169 lb " (76.7 kg)              Today, you had the following     No orders found for display         Today's Medication Changes          These changes are accurate as of 7/18/18 12:22 PM.  If you have any questions, ask your nurse or doctor.               These medicines have changed or have updated prescriptions.        Dose/Directions    fluticasone 50 MCG/ACT spray   Commonly known as:  FLONASE   This may have changed:  Another medication with the same name was removed. Continue taking this medication, and follow the directions you see here.   Used for:  Chronic non-seasonal allergic rhinitis, unspecified trigger   Changed by:  Fide Antonio PA-C        USE TWO SPRAYS IN EACH NOSTRIL EVERY DAY   Quantity:  48 g   Refills:  2       norethindrone-ethinyl estradiol 1-20 MG-MCG per tablet   Commonly known as:  HANANE FE 1/20   This may have changed:  additional instructions   Used for:  Dysmenorrhea   Changed by:  Fide Antonio PA-C        Dose:  1 tablet   Take 1 tablet by mouth daily Continuously for 3 months   Quantity:  91 tablet   Refills:  3         Stop taking these medicines if you haven't already. Please contact your care team if you have questions.     cyclobenzaprine 10 MG tablet   Commonly known as:  FLEXERIL   Stopped by:  Fide Antonio PA-C                Where to get your medicines      These medications were sent to [facility] [place] [address] [place] [address] [facility] [address]     Phone:  [phone]     fluticasone 50 MCG/ACT spray    norethindrone-ethinyl estradiol 1-20 MG-MCG per tablet                Primary Care Provider Fax #    Physician No Ref-Primary [phone]       No address on file        Equal Access to Services     [facility] : Andree Carrasco, walaura huff, qaybta kaalmabaljeet england, jennifer bullard. So [facility] [phone].    ATENCIÓN: John harris,  tiene a granger disposición servicios gratuitos de asistencia lingüística. Shay cox [phone].    We comply with applicable federal civil rights laws and [place] laws. We do not discriminate on the basis of race, color, national origin, age, disability, sex, sexual orientation, or gender identity.            Thank you!     Thank you for choosing [facility]  for your care. Our goal is always to provide you with excellent care. Hearing back from our patients is one way we can continue to improve our services. Please take a few minutes to complete the written survey that you may receive in the mail after your visit with us. Thank you!             Your Updated Medication List - Protect others around you: Learn how to safely use, store and throw away your medicines at www.disposemymeds.org.          This list is accurate as of 7/18/18 12:22 PM.  Always use your most recent med list.                   Brand Name Dispense Instructions for use Diagnosis    fluticasone 50 MCG/ACT spray    FLONASE    48 g    USE TWO SPRAYS IN EACH NOSTRIL EVERY DAY    Chronic non-seasonal allergic rhinitis, unspecified trigger       norethindrone-ethinyl estradiol 1-20 MG-MCG per tablet    HANANE FE 1/20    91 tablet    Take 1 tablet by mouth daily Continuously for 3 months    Dysmenorrhea

## 2018-07-18 NOTE — NURSING NOTE
"Chief Complaint   Patient presents with     Physical    /80 (BP Location: Right arm, Cuff Size: Adult Regular)  Pulse 89  Temp 98.6  F (37  C) (Oral)  Ht 5' 5\" (1.651 m)  Wt 165 lb (74.8 kg)  SpO2 99%  BMI 27.46 kg/m2 Body Mass Index is Body mass index is 27.46 kg/(m^2).  BP completed using cuff size : regular right arm  Jessenia Sim MA        "

## 2018-09-13 ENCOUNTER — HOSPITAL ENCOUNTER (OUTPATIENT)
Dept: MAMMOGRAPHY | Facility: CLINIC | Age: 43
Discharge: HOME | End: 2018-09-13
Attending: PHYSICIAN ASSISTANT | Admitting: PHYSICIAN ASSISTANT
Payer: COMMERCIAL

## 2018-09-13 DIAGNOSIS — Z12.31 VISIT FOR SCREENING MAMMOGRAM: ICD-10-CM

## 2018-09-13 PROCEDURE — 77063 BREAST TOMOSYNTHESIS BI: CPT

## 2018-09-13 NOTE — LETTER
September 17, 2018      Laure Mendez  [address] [place] [address]        Dear ,    We are writing to inform you of your test results.    -Mammogram was normal.  ADVISE: rechecking in 1 year.    Resulted Orders   MA Screen Bilateral w/Jose    Narrative    SCREENING MAMMOGRAM, BILATERAL, DIGITAL w/CAD and TOMOSYNTHESIS,  9/13/2018 10:37 AM    BREAST DENSITY: Extremely dense.    CLINICAL INFORMATION: Breast screening.  ; Visit for screening  mammogram, 7/5/17, 7/25/13     FINDINGS: Negative. Stable exam. Screening exam in one year  recommended.      Impression    IMPRESSION: BI-RADS CATEGORY: 1 -  Negative.    RECOMMENDED FOLLOW-UP: Annual Mammography.      DAYNA LOYD MD       If you have any questions or concerns, please call the clinic at the number listed above.       Sincerely,        [facility] MAMMO ROOM 1

## 2018-09-14 NOTE — PROGRESS NOTES
Please call or write patient with the following results:    -Mammogram was normal.  ADVISE: rechecking in 1 year.    Electronically Signed By: Fide Vera PA-C

## 2018-10-09 ENCOUNTER — MYC MEDICAL ADVICE (OUTPATIENT)
Dept: FAMILY MEDICINE | Facility: CLINIC | Age: 43
End: 2018-10-09

## 2018-10-09 ENCOUNTER — TELEPHONE (OUTPATIENT)
Dept: FAMILY MEDICINE | Facility: CLINIC | Age: 43
End: 2018-10-09

## 2018-10-09 DIAGNOSIS — Z20.818 STREP THROAT EXPOSURE: Primary | ICD-10-CM

## 2018-10-09 RX ORDER — PENICILLIN V POTASSIUM 500 MG/1
500 TABLET, FILM COATED ORAL 2 TIMES DAILY
Qty: 20 TABLET | Refills: 0 | Status: SHIPPED | OUTPATIENT
Start: 2018-10-09 | End: 2018-10-19

## 2018-10-09 NOTE — TELEPHONE ENCOUNTER
send [employer] advising that patient is starting to develop a sore throat and is leaving town tomorrow morning.    I huddled with RL and he gave verbal order for Pen  mg, twice per day for 10 days. Patient has no documented allergy for penicillin.    [employer] message sent to patient with above information.    VIRIDIANA Sanders, RN, N  [facility]) [phone]

## 2019-01-31 ENCOUNTER — TELEPHONE (OUTPATIENT)
Dept: FAMILY MEDICINE | Facility: CLINIC | Age: 44
End: 2019-01-31

## 2019-01-31 DIAGNOSIS — N94.6 DYSMENORRHEA: ICD-10-CM

## 2019-01-31 RX ORDER — NORETHINDRONE ACETATE AND ETHINYL ESTRADIOL 1MG-20(21)
1 KIT ORAL DAILY
Qty: 91 TABLET | Refills: 3 | Status: CANCELLED | OUTPATIENT
Start: 2019-01-31

## 2019-01-31 NOTE — TELEPHONE ENCOUNTER
Patient was wondering if she can get new rx for Ava not Ava fe and get an Rx written for #112 since she is taking continuously ..and with 4 refills so she can get a full years worth of refills.. Thanks...

## 2019-02-01 RX ORDER — NORETHINDRONE ACETATE AND ETHINYL ESTRADIOL .02; 1 MG/1; MG/1
1 TABLET ORAL DAILY
Qty: 84 TABLET | Refills: 1 | Status: SHIPPED | OUTPATIENT
Start: 2019-02-01 | End: 2019-07-24

## 2019-02-01 NOTE — TELEPHONE ENCOUNTER
Called and spoke with pharmacist to verify. Given Ava (regular no fe) comes with no placebo pills can still prescribe #84 for continuous dosing instead of #112 as noted below. Did review that pt is due for physical in July so only given 6 month script instead of 1 year.   Electronically Signed By: Fide Vera PA-C

## 2019-06-17 PROBLEM — J30.89 CHRONIC NON-SEASONAL ALLERGIC RHINITIS: Status: ACTIVE | Noted: 2018-07-18

## 2019-07-24 ENCOUNTER — OFFICE VISIT (OUTPATIENT)
Dept: FAMILY MEDICINE | Facility: CLINIC | Age: 44
End: 2019-07-24
Payer: COMMERCIAL

## 2019-07-24 VITALS
BODY MASS INDEX: 29.66 KG/M2 | WEIGHT: 178 LBS | TEMPERATURE: 98.4 F | DIASTOLIC BLOOD PRESSURE: 78 MMHG | HEIGHT: 65 IN | HEART RATE: 70 BPM | SYSTOLIC BLOOD PRESSURE: 124 MMHG | OXYGEN SATURATION: 99 %

## 2019-07-24 DIAGNOSIS — D22.9 MULTIPLE NEVI: ICD-10-CM

## 2019-07-24 DIAGNOSIS — E78.00 ELEVATED LDL CHOLESTEROL LEVEL: ICD-10-CM

## 2019-07-24 DIAGNOSIS — E66.3 OVERWEIGHT (BMI 25.0-29.9): ICD-10-CM

## 2019-07-24 DIAGNOSIS — F41.9 ANXIETY: ICD-10-CM

## 2019-07-24 DIAGNOSIS — Z13.6 CARDIOVASCULAR SCREENING; LDL GOAL LESS THAN 160: ICD-10-CM

## 2019-07-24 DIAGNOSIS — F32.1 MODERATE MAJOR DEPRESSION (H): ICD-10-CM

## 2019-07-24 DIAGNOSIS — Z13.1 SCREENING FOR DIABETES MELLITUS: ICD-10-CM

## 2019-07-24 DIAGNOSIS — Z00.00 ROUTINE GENERAL MEDICAL EXAMINATION AT A HEALTH CARE FACILITY: Primary | ICD-10-CM

## 2019-07-24 DIAGNOSIS — Z13.21 ENCOUNTER FOR VITAMIN DEFICIENCY SCREENING: ICD-10-CM

## 2019-07-24 DIAGNOSIS — Z12.4 CERVICAL CANCER SCREENING: ICD-10-CM

## 2019-07-24 PROCEDURE — G0145 SCR C/V CYTO,THINLAYER,RESCR: HCPCS | Performed by: PHYSICIAN ASSISTANT

## 2019-07-24 PROCEDURE — 87624 HPV HI-RISK TYP POOLED RSLT: CPT | Performed by: PHYSICIAN ASSISTANT

## 2019-07-24 PROCEDURE — 99396 PREV VISIT EST AGE 40-64: CPT | Performed by: PHYSICIAN ASSISTANT

## 2019-07-24 PROCEDURE — 99214 OFFICE O/P EST MOD 30 MIN: CPT | Mod: 25 | Performed by: PHYSICIAN ASSISTANT

## 2019-07-24 ASSESSMENT — PATIENT HEALTH QUESTIONNAIRE - PHQ9
SUM OF ALL RESPONSES TO PHQ QUESTIONS 1-9: 15
5. POOR APPETITE OR OVEREATING: SEVERAL DAYS

## 2019-07-24 ASSESSMENT — ANXIETY QUESTIONNAIRES
2. NOT BEING ABLE TO STOP OR CONTROL WORRYING: MORE THAN HALF THE DAYS
IF YOU CHECKED OFF ANY PROBLEMS ON THIS QUESTIONNAIRE, HOW DIFFICULT HAVE THESE PROBLEMS MADE IT FOR YOU TO DO YOUR WORK, TAKE CARE OF THINGS AT HOME, OR GET ALONG WITH OTHER PEOPLE: SOMEWHAT DIFFICULT
7. FEELING AFRAID AS IF SOMETHING AWFUL MIGHT HAPPEN: NOT AT ALL
1. FEELING NERVOUS, ANXIOUS, OR ON EDGE: SEVERAL DAYS
GAD7 TOTAL SCORE: 10
5. BEING SO RESTLESS THAT IT IS HARD TO SIT STILL: NOT AT ALL
3. WORRYING TOO MUCH ABOUT DIFFERENT THINGS: NEARLY EVERY DAY
6. BECOMING EASILY ANNOYED OR IRRITABLE: NEARLY EVERY DAY

## 2019-07-24 ASSESSMENT — MIFFLIN-ST. JEOR: SCORE: 1463.28

## 2019-07-24 NOTE — PROGRESS NOTES
"   SUBJECTIVE:   CC: Laure Mendez is an 44 year old woman who presents for preventive health visit.     Healthy Habits:    Do you get at least three servings of calcium containing foods daily (dairy, green leafy vegetables, etc.)? yes    Amount of exercise or daily activities, outside of work: 0 day(s) per week    Problems taking medications regularly No    Medication side effects: No    Have you had an eye exam in the past two years? no    Do you see a dentist twice per year? yes    Do you have sleep apnea, excessive snoring or daytime drowsiness?no    Dad smoked in his 20's - reports recently he wound up having slow heart rate then doing work-up identified to have significant bradycardia in 30's. Wound up having CAD with \"3 blockages\" requiring multiple stents.    Refill OCPs last year. Was given this as continuous dosing. Reports that she initially would have some spotting, but has now been amenorrheic for past 1.5 yrs after taking consistently. Always had regular periods prior to being on OCPs. Had same issue in her early 20's when was on OCPs at that time as well.   No liver disease.  Remote hx of migraine, but never had aura. Would get light sensitivity, but that's it. Last migraine was more than 10 years ago.  No breast cancer.  Resumed for contraception, but also to help with mood.     Depression Followup; worsening over the past 6 months. Increasing irritability. A bit of work burn out issues. Just feels like she has a loss of energy. Increased naps and not working out anymore. Had been on low dose of prozac previously, but discontinued as didn't think she needed it anymore. Did notice that when she was on prozac didn't get the same irritability with driving that she has now. Felt that it helped with the lows, but not necessarily that she got enjoyment from it. Wonders about other options. Has never tried anything else.  Fhx: sister was treated for anxiety/depression  Mom has been recommended to be " treated, but is non compliant. 3 of mom's siblings have committed suicide.       How are you doing with your depression since your last visit? Feeling worse - was on medication two years ago and now wants to go back on them    Are you having other symptoms that might be associated with depression? Yes:  decreased energy, no interest in doing anything, emotional, irritiable     Have you had a significant life event?  No, not specifically. Struggling with work a little bit. She works as a PA in general surgery on [employer] and all of her coworkers keep going on maternity leave and she was never able to have a child. It makes her sad.     Are you feeling anxious or having panic attacks?   No    Do you have any concerns with your use of alcohol or other drugs? No    Social History     Tobacco Use     Smoking status: Never Smoker     Smokeless tobacco: Never Used   Substance Use Topics     Alcohol use: Yes     Comment: every day 1-2 drinks      Drug use: No     PHQ 7/13/2017   PHQ-9 Total Score 4   Q9: Thoughts of better off dead/self-harm past 2 weeks Not at all     CRISTINA-7 SCORE 8/6/2014 8/13/2015 7/13/2017   Total Score 1 2 -   Total Score - - -   Total Score - - 3       Suicide Assessment Five-step Evaluation and Treatment (SAFE-T)    Today's PHQ-2 Score:   PHQ-2 ( 1999 [employer]) 7/15/2018 6/20/2018   Q1: Little interest or pleasure in doing things 0 0   Q2: Feeling down, depressed or hopeless 0 0   PHQ-2 Score 0 0   Q1: Little interest or pleasure in doing things Not at all -   Q2: Feeling down, depressed or hopeless Not at all -   PHQ-2 Score 0 -       Abuse: Current or Past(Physical, Sexual or Emotional)- No  Do you feel safe in your environment? Yes    Social History     Tobacco Use     Smoking status: Never Smoker     Smokeless tobacco: Never Used   Substance Use Topics     Alcohol use: Yes     Comment: every day 1-2 drinks      If you drink alcohol do you typically have >3 drinks per day or >7 drinks  per week? No                     Reviewed orders with patient.  Reviewed health maintenance and updated orders accordingly - Yes  Patient Active Problem List   Diagnosis     CARDIOVASCULAR SCREENING; LDL GOAL LESS THAN 160     Anxiety     Elevated LDL cholesterol level     Chronic non-seasonal allergic rhinitis, unspecified trigger     Overweight (BMI 25.0-29.9)     Past Surgical History:   Procedure Laterality Date     C NONSPECIFIC PROCEDURE      wisdom teeth     C NONSPECIFIC PROCEDURE      cervical Lymph node bx-related to tonsils     C NONSPECIFIC PROCEDURE      Tonsillectomy     C NONSPECIFIC PROCEDURE  2000    colposcopy s/p ASCUS     MOUTH SURGERY       SOFT TISSUE SURGERY         Social History     Tobacco Use     Smoking status: Never Smoker     Smokeless tobacco: Never Used   Substance Use Topics     Alcohol use: Yes     Comment: every day 1-2 drinks      Family History   Problem Relation Age of Onset     Lipids Mother      Thyroid Disease Mother         lobectomy for thyroid nodule     Hypertension Father      Asthma Father      Allergies Father         Asthma     Coronary Artery Disease Father         stent     Pacemaker Father      Allergies Sister         excercise induced asthma     Cardiovascular Paternal Grandfather         PE     Coronary Artery Disease Maternal Grandfather         multiple MI     Genitourinary Problems Sister         multi ASCUS paps     Breast Cancer Paternal Aunt 80     Breast Cancer Paternal Aunt 80     Diabetes No family hx of      Colon Cancer No family hx of          Current Outpatient Medications   Medication Sig Dispense Refill     norethindrone-ethinyl estradiol (HANANE FE 1/20) 1-20 MG-MCG per tablet Take 1 tablet by mouth daily Continuously for 3 months 91 tablet 3     fluticasone (FLONASE) 50 MCG/ACT spray USE TWO SPRAYS IN EACH NOSTRIL EVERY DAY 48 g 2     Allergies   Allergen Reactions     Bactrim [Sulfamethoxazole W-Trimethoprim]        Mammogram Screening: Patient  "under age 50, mutual decision reflected in health maintenance.      Pertinent mammograms are reviewed under the imaging tab.  History of abnormal Pap smear: NO - age 30- 65 PAP every 3 years recommended  PAP / HPV Latest Ref Rng & Units 4/20/2016 7/18/2013 10/25/2006   PAP - NIL NIL NIL   HPV 16 DNA NEG Negative - -   HPV 18 DNA NEG Negative - -   OTHER HR HPV NEG Negative - -     Reviewed and updated as needed this visit by clinical staff  Tobacco  Allergies  Meds  Med Hx  Surg Hx  Fam Hx  Soc Hx        Reviewed and updated as needed this visit by Provider  Tobacco  Allergies  Meds  Med Hx  Surg Hx  Fam Hx  Soc Hx           ROS:  CONSTITUTIONAL: NEGATIVE for fever, chills, change in weight  INTEGUMENTARU/SKIN: + for fair skin and early childhood sun exposure. Would like to have a fully body skin exam.  EYES: NEGATIVE for vision changes or irritation  ENT: NEGATIVE for ear, mouth and throat problems  RESP: NEGATIVE for significant cough or SOB  BREAST: NEGATIVE for masses, tenderness or discharge  CV: NEGATIVE for chest pain, palpitations or peripheral edema  GI: NEGATIVE for nausea, abdominal pain, heartburn, or change in bowel habits  : NEGATIVE for unusual urinary or vaginal symptoms. Amenorrheic on OCPs.  MUSCULOSKELETAL: NEGATIVE for significant arthralgias or myalgia  NEURO: NEGATIVE for weakness, dizziness or paresthesias  PSYCHIATRIC: NEGATIVE for changes in mood or affect    OBJECTIVE:   /78 (BP Location: Right arm, Cuff Size: Adult Regular)   Pulse 70   Temp 98.4  F (36.9  C) (Oral)   Ht 1.651 m (5' 5\")   Wt 80.7 kg (178 lb)   SpO2 99%   BMI 29.62 kg/m    EXAM:  GENERAL: healthy, alert and no distress  EYES: Eyes grossly normal to inspection, PERRL and conjunctivae and sclerae normal  HENT: ear canals and TM's normal, nose and mouth without ulcers or lesions  NECK: no adenopathy, no asymmetry, masses, or scars and thyroid normal to palpation  RESP: lungs clear to auscultation - no " rales, rhonchi or wheezes  BREAST: normal without masses, tenderness or nipple discharge and no palpable axillary masses or adenopathy  CV: regular rate and rhythm, normal S1 S2, no S3 or S4, no murmur, click or rub, no peripheral edema and peripheral pulses strong  ABDOMEN: soft, nontender, no hepatosplenomegaly, no masses and bowel sounds normal   (female): normal female external genitalia, normal urethral meatus, vaginal mucosa pink, moist, well rugated, and normal cervix/adnexa/uterus without masses or discharge  MS: no gross musculoskeletal defects noted, no edema  SKIN: multiple scattered nevi across upper back/shoulders.   NEURO: Normal strength and tone, mentation intact and speech normal  PSYCH: mentation appears normal, affect normal/bright mostly but does become tearful when discussing stressors noted above in HPI.    Diagnostic Test Results:  Labs reviewed in Epic  See flowsheets for PHQ/CRISTINA scores    ASSESSMENT/PLAN:       ICD-10-CM    1. Routine general medical examination at a health care facility Z00.00    2. Anxiety F41.9 sertraline (ZOLOFT) 50 MG tablet     MENTAL HEALTH REFERRAL  - Adult; Outpatient Treatment; Individual/Couples/Family/Group Therapy/Health Psychology; [facility] [phone]; We will contact you to schedule the appointment or please call with any questions     **TSH with free T4 reflex FUTURE 2mo   3. Moderate major depression (H) F32.1 sertraline (ZOLOFT) 50 MG tablet     MENTAL HEALTH REFERRAL  - Adult; Outpatient Treatment; Individual/Couples/Family/Group Therapy/Health Psychology[place] [facility] [facility] [phone]; We will contact you to schedule the appointment or please call with any questions     **TSH with free T4 reflex FUTURE 2mo   4. Multiple nevi D22.9 DERMATOLOGY REFERRAL   5. Cervical cancer screening Z12.4 Pap imaged thin layer screen with HPV - recommended age 30 - 65     HPV High Risk Types DNA Cervical   6. Elevated LDL  "cholesterol level E78.00 Lipid panel reflex to direct LDL Fasting   7. CARDIOVASCULAR SCREENING; LDL GOAL LESS THAN 160 Z13.6 Lipid panel reflex to direct LDL Fasting   8. Overweight (BMI 25.0-29.9) E66.3    9. Encounter for vitamin deficiency screening Z13.21 **Vitamin D Deficiency FUTURE 2mo   10. Screening for diabetes mellitus Z13.1 **Comprehensive metabolic panel FUTURE 2mo   Pt reports worsening depression/anxiety symptoms so discussed different SSRIs as she wished to give consideration to something other than prozac and is amenable to trial of zoloft. Strongly encouraged therapy/counseling as well given life stressors/social contributors as noted above in HPI. Will screen TSH and vitamin D in addition to preventative health labs.  Agree that she should also have a full body skin exam for multiple scattered nevi and hx of significant sun exposure in childhood.     COUNSELING:   Reviewed preventive health counseling, as reflected in patient instructions       Regular exercise       Healthy diet/nutrition       Contraception       Safe sex practices/STD prevention    Estimated body mass index is 29.62 kg/m  as calculated from the following:    Height as of this encounter: 1.651 m (5' 5\").    Weight as of this encounter: 80.7 kg (178 lb).    Weight management plan: Discussed healthy diet and exercise guidelines     reports that she has never smoked. She has never used smokeless tobacco.      Counseling Resources:  ATP IV Guidelines  Pooled Cohorts Equation Calculator  Breast Cancer Risk Calculator  FRAX Risk Assessment  ICSI Preventive Guidelines  Dietary Guidelines for Americans, 2010  USDA's MyPlate  ASA Prophylaxis  Lung CA Screening    Fide Vera PA-C  [facility]  "

## 2019-07-25 ASSESSMENT — ANXIETY QUESTIONNAIRES: GAD7 TOTAL SCORE: 10

## 2019-07-30 LAB
COPATH REPORT: NORMAL
PAP: NORMAL

## 2019-07-31 ENCOUNTER — HOSPITAL ENCOUNTER (OUTPATIENT)
Dept: LAB | Facility: CLINIC | Age: 44
Discharge: HOME | End: 2019-07-31
Attending: PHYSICIAN ASSISTANT | Admitting: PHYSICIAN ASSISTANT
Payer: COMMERCIAL

## 2019-07-31 DIAGNOSIS — E78.00 ELEVATED LDL CHOLESTEROL LEVEL: ICD-10-CM

## 2019-07-31 DIAGNOSIS — Z13.21 ENCOUNTER FOR VITAMIN DEFICIENCY SCREENING: ICD-10-CM

## 2019-07-31 DIAGNOSIS — Z13.6 CARDIOVASCULAR SCREENING; LDL GOAL LESS THAN 160: ICD-10-CM

## 2019-07-31 DIAGNOSIS — F41.9 ANXIETY: ICD-10-CM

## 2019-07-31 DIAGNOSIS — Z13.1 SCREENING FOR DIABETES MELLITUS: ICD-10-CM

## 2019-07-31 DIAGNOSIS — F32.1 MODERATE MAJOR DEPRESSION (H): ICD-10-CM

## 2019-07-31 LAB
ALBUMIN SERPL-MCNC: 3.6 G/DL (ref 3.4–5)
ALP SERPL-CCNC: 48 U/L (ref 40–150)
ALT SERPL W P-5'-P-CCNC: 18 U/L (ref 0–50)
ANION GAP SERPL CALCULATED.3IONS-SCNC: 6 MMOL/L (ref 3–14)
AST SERPL W P-5'-P-CCNC: 20 U/L (ref 0–45)
BILIRUB SERPL-MCNC: 0.9 MG/DL (ref 0.2–1.3)
BUN SERPL-MCNC: 14 MG/DL (ref 7–30)
CALCIUM SERPL-MCNC: 8.6 MG/DL (ref 8.5–10.1)
CHLORIDE SERPL-SCNC: 107 MMOL/L (ref 94–109)
CHOLEST SERPL-MCNC: 239 MG/DL
CO2 SERPL-SCNC: 24 MMOL/L (ref 20–32)
CREAT SERPL-MCNC: 0.85 MG/DL (ref 0.52–1.04)
DEPRECATED CALCIDIOL+CALCIFEROL SERPL-MC: 48 UG/L (ref 20–75)
FINAL DIAGNOSIS: NORMAL
GFR SERPL CREATININE-BSD FRML MDRD: 84 ML/MIN/{1.73_M2}
GLUCOSE SERPL-MCNC: 86 MG/DL (ref 70–99)
HDLC SERPL-MCNC: 71 MG/DL
HPV HR 12 DNA CVX QL NAA+PROBE: NEGATIVE
HPV16 DNA SPEC QL NAA+PROBE: NEGATIVE
HPV18 DNA SPEC QL NAA+PROBE: NEGATIVE
LDLC SERPL CALC-MCNC: 148 MG/DL
NONHDLC SERPL-MCNC: 168 MG/DL
POTASSIUM SERPL-SCNC: 4.1 MMOL/L (ref 3.4–5.3)
PROT SERPL-MCNC: 7.6 G/DL (ref 6.8–8.8)
SODIUM SERPL-SCNC: 137 MMOL/L (ref 133–144)
SPECIMEN DESCRIPTION: NORMAL
SPECIMEN SOURCE CVX/VAG CYTO: NORMAL
TRIGL SERPL-MCNC: 101 MG/DL
TSH SERPL DL<=0.005 MIU/L-ACNC: 2.31 MU/L (ref 0.4–4)

## 2019-07-31 PROCEDURE — 36415 COLL VENOUS BLD VENIPUNCTURE: CPT | Performed by: PHYSICIAN ASSISTANT

## 2019-07-31 PROCEDURE — 80061 LIPID PANEL: CPT | Performed by: PHYSICIAN ASSISTANT

## 2019-07-31 PROCEDURE — 80053 COMPREHEN METABOLIC PANEL: CPT | Performed by: PHYSICIAN ASSISTANT

## 2019-07-31 PROCEDURE — 84443 ASSAY THYROID STIM HORMONE: CPT | Performed by: PHYSICIAN ASSISTANT

## 2019-07-31 PROCEDURE — 82306 VITAMIN D 25 HYDROXY: CPT | Performed by: PHYSICIAN ASSISTANT

## 2019-07-31 NOTE — RESULT ENCOUNTER NOTE
Ms. Mendez,    -Cholesterol levels (LDL,HDL, Triglycerides) are normal. ADVISE: rechecking in 5 years.  Your 10 year risk of a heart event is less than 5% (see below) which is normal.  -The 10-year ASCVD risk score (Dixrufus PHAN Jr., et al., 2013) is: 0.6%    Values used to calculate the score:      Age: 44 years      Sex: Female      Is Non- : No      Diabetic: No      Tobacco smoker: No      Systolic Blood Pressure: 124 mmHg      Is BP treated: No      HDL Cholesterol: 71 mg/dL      Total Cholesterol: 239 mg/dL   -Liver and gallbladder tests are normal (ALT,AST, Alk phos, bilirubin), kidney function is normal (Cr, GFR), sodium is normal, potassium is normal, calcium is normal, glucose is normal.  -TSH (thyroid stimulating hormone) level is normal which indicates normal thyroid function.    If you have further questions about the interpretation of your labs, [email or web address] is a good website to check out for further information.    Please contact the clinic if you have additional questions.  Thank you.    Sincerely,    Preston Mejia MD

## 2019-07-31 NOTE — RESULT ENCOUNTER NOTE
Ms. Mendez,    -Vitamin D level is normal and getting 1000 IU daily in your diet or supplements is recommended.     If you have further questions about the interpretation of your labs, [email or web address] is a good website to check out for further information.    Please contact the clinic if you have additional questions.  Thank you.    Sincerely,    Preston Mejia MD

## 2019-08-05 DIAGNOSIS — N94.6 DYSMENORRHEA: ICD-10-CM

## 2019-08-05 DIAGNOSIS — J30.9 ALLERGIC RHINITIS: Primary | ICD-10-CM

## 2019-08-05 RX ORDER — FLUTICASONE PROPIONATE 50 MCG
SPRAY, SUSPENSION (ML) NASAL
Qty: 48 G | Refills: 1 | Status: SHIPPED | OUTPATIENT
Start: 2019-08-05 | End: 2019-11-26

## 2019-08-05 RX ORDER — NORETHINDRONE ACETATE/ETHINYL ESTRADIOL 1MG-20MCG
KIT ORAL
Qty: 91 TABLET | Refills: 3 | Status: SHIPPED | OUTPATIENT
Start: 2019-08-05 | End: 2020-07-27

## 2019-08-05 NOTE — TELEPHONE ENCOUNTER
"Requested Prescriptions   Pending Prescriptions Disp Refills     fluticasone (FLONASE) 50 MCG/ACT nasal spray [Pharmacy Med Name: FLUTICASONE 50MCG NASAL SPRAY]  Last Written Prescription Date:  7/18/2018  Last Fill Quantity: 48 g,  # refills: 2   Last office visit: 7/24/2019 with prescribing provider:  Jody Potter Office Visit:   Next 5 appointments (look out 90 days)    Sep 09, 2019  3:40 PM CDT  Office Visit with Fide Vera PA-C  [facility] [address] [facility] [id number] [phone]            48 g 1     Sig: INHALE 2 SPRAYS IN EACH NOSTRIL DAILY       Inhaled Steroids Protocol Passed - 8/5/2019  6:48 AM        Passed - Patient is age 12 or older        Passed - Recent (12 mo) or future (30 days) visit within the authorizing provider's specialty     Patient had office visit in the last 12 months or has a visit in the next 30 days with authorizing provider or within the authorizing provider's specialty.  See \"Patient Info\" tab in inbasket, or \"Choose Columns\" in Meds & Orders section of the refill encounter.              Passed - Medication is active on med list                  HANANE 1/20 1-20 MG-MCG tablet [Pharmacy Med Name: HANANE 1/20 1-20MG-MCG TABS]  Last Written Prescription Date:  7/18/2018  Last Fill Quantity: 91 tablet,  # refills: 3   Last office visit: 7/24/2019 with prescribing provider:  Jody Potter Office Visit:   Next 5 appointments (look out 90 days)    Sep 09, 2019  3:40 PM CDT  Office Visit with Fide Vera PA-C  [facility] ([facility] [address] [facility] [id number] [phone]            84 tablet 1     Sig: TAKE ONE TABLET BY MOUTH ONCE DAILY CONTINUOUSLY       Contraceptives Protocol Failed - 8/5/2019  6:48 AM        Failed - Medication is active on med list        Passed - Patient is not a current smoker if age is 35 or older        Passed - Recent (12 mo) or " "future (30 days) visit within the authorizing provider's specialty     Patient had office visit in the last 12 months or has a visit in the next 30 days with authorizing provider or within the authorizing provider's specialty.  See \"Patient Info\" tab in inbasket, or \"Choose Columns\" in Meds & Orders section of the refill encounter.              Passed - No active pregnancy on record        Passed - No positive pregnancy test in past 12 months        "

## 2019-08-05 NOTE — TELEPHONE ENCOUNTER
Prescription approved per [facility] Refill Protocol.  VIRIDIANA ShirleyN, RN  [facility]

## 2019-08-25 ENCOUNTER — HOSPITAL ENCOUNTER (EMERGENCY)
Facility: CLINIC | Age: 44
Discharge: HOME | End: 2019-08-25
Attending: EMERGENCY MEDICINE | Admitting: EMERGENCY MEDICINE
Payer: COMMERCIAL

## 2019-08-25 ENCOUNTER — APPOINTMENT (OUTPATIENT)
Dept: GENERAL RADIOLOGY | Facility: CLINIC | Age: 44
End: 2019-08-25
Attending: EMERGENCY MEDICINE
Payer: COMMERCIAL

## 2019-08-25 VITALS
OXYGEN SATURATION: 100 % | RESPIRATION RATE: 16 BRPM | DIASTOLIC BLOOD PRESSURE: 85 MMHG | HEIGHT: 65 IN | WEIGHT: 170 LBS | SYSTOLIC BLOOD PRESSURE: 131 MMHG | BODY MASS INDEX: 28.32 KG/M2

## 2019-08-25 DIAGNOSIS — S52.591A OTHER CLOSED FRACTURE OF DISTAL END OF RIGHT RADIUS, INITIAL ENCOUNTER: ICD-10-CM

## 2019-08-25 PROCEDURE — 25600 CLTX DST RDL FX/EPHYS SEP WO: CPT | Mod: RT

## 2019-08-25 PROCEDURE — 73110 X-RAY EXAM OF WRIST: CPT | Mod: RT

## 2019-08-25 PROCEDURE — 99284 EMERGENCY DEPT VISIT MOD MDM: CPT | Mod: 25

## 2019-08-25 RX ORDER — MULTIVIT-MIN/IRON/FOLIC ACID/K 18-600-40
CAPSULE ORAL
COMMUNITY

## 2019-08-25 RX ORDER — LORATADINE 10 MG/1
10 TABLET ORAL DAILY
COMMUNITY
End: 2023-12-06 | Stop reason: ALTCHOICE

## 2019-08-25 ASSESSMENT — ENCOUNTER SYMPTOMS: ARTHRALGIAS: 1

## 2019-08-25 ASSESSMENT — MIFFLIN-ST. JEOR: SCORE: 1426.99

## 2019-08-25 NOTE — DISCHARGE INSTRUCTIONS
Discharge Instructions  Splint Care    You had a splint put on today to help protect your injury and help it heal.  Splints are used to treat things like strains, sprains, cuts and fractures (broken bones).    Be sure your splint is not too tight!  If you splint is too tight, it may cause loss of blood supply.  Signs of your splint being too tight include:  your arm or leg hurting a lot more; your fingers or toes getting numb, cold, pale or blue; or your child is crying, fussing or seeming restless.    Return to the Emergency Department right away if:  You have increased pain or pressure around the injury.  You have numbness, tingling, or cool, pale, or blue toes or fingers past the injury.  Your child is more fussy than normal, crying a lot, or restless.  Your splint becomes soft, breaks, or is wet.  Your splint begins to smell bad.  Your splint is cutting into your skin.    Home care:  Keep the injured area above the level of your heart while laying or sitting down.  This will help decrease the swelling and the pain.  Keep the splint dry.  Do not put objects down or inside the splint.  If there is an elastic bandage (Ace  wrap) holding the splint on this may be loosened slightly to relieve pressure or pain.  If pain continues return to the Emergency Department right away.  Do not remove your splint by yourself unless told to by your doctor.    Follow-up:  Sometimes the splint put on in the Emergency Department needs to be changed once the swelling has gone down and a more permanent cast needs to be placed.  This is usually done by a bone specialist doctor (Orthopedist).  Follow the instructions given to you by your doctor today.    X-rays:  X-rays done today were read by your doctor but will also be read by a radiologist.  We will contact you if the radiologist sees anything different on the x-ray.  Your regular doctor may also want to review your x-rays on follow-up.    You could have a fracture (break), even if  we told you your x-rays were normal. X-rays are not always certain, and some fractures are hard to see and may not show up right away.  Also, your x-ray may look like you have a fracture, even though you do not.  It is important to follow-up with your regular doctor.     If you were given a prescription for medicine here today, be sure to read all of the information (including the package insert) that comes with your prescription.  This will include important information about the medicine, its side effects, and any warnings that you need to know about.  The pharmacist who fills the prescription can provide more information and answer questions you may have about the medicine.  If you have questions or concerns that the pharmacist cannot address, please call or return to the Emergency Department.   Opioid Medication Information    Pain medications are among the most commonly prescribed medicines, so we are including this information for all our patients. If you did not receive pain medication or get a prescription for pain medicine, you can ignore it.     You may have been given a prescription for an opioid (narcotic) pain medicine and/or have received a pain medicine while here in the Emergency Department. These medicines can make you drowsy or impaired. You must not drive, operate dangerous equipment, or engage in any other dangerous activities while taking these medications. If you drive while taking these medications, you could be arrested for DUI, or driving under the influence. Do not drink any alcohol while you are taking these medications.     Opioid pain medications can cause addiction. If you have a history of chemical dependency of any type, you are at a higher risk of becoming addicted to pain medications.  Only take these prescribed medications to treat your pain when all other options have been tried. Take it for as short a time and as few doses as possible. Store your pain pills in a secure place, as  they are frequently stolen and provide a dangerous opportunity for children or visitors in your house to start abusing these powerful medications. We will not replace any lost or stolen medicine.  As soon as your pain is better, you should flush all your remaining medication.     Many prescription pain medications contain Tylenol  (acetaminophen), including Vicodin , Tylenol #3 , Norco , Lortab , and Percocet .  You should not take any extra pills of Tylenol  if you are using these prescription medications or you can get very sick.  Do not ever take more than 3000 mg of acetaminophen in any 24 hour period.    All opioids tend to cause constipation. Drink plenty of water and eat foods that have a lot of fiber, such as fruits, vegetables, prune juice, apple juice and high fiber cereal.  Take a laxative if you don t move your bowels at least every other day. Miralax , Milk of Magnesia, Colace , or Senna  can be used to keep you regular.      Remember that you can always come back to the Emergency Department if you are not able to see your regular doctor in the amount of time listed above, if you get any new symptoms, or if there is anything that worries you.

## 2019-08-25 NOTE — ED AVS SNAPSHOT
[facility] Emergency Department  [address]  [facility] [address]  Phone:  [phone]  Fax:  [phone]                                    Laure Mendez   MRN: [id number]    Department:  [facility] Emergency Department   Date of Visit:  8/25/2019           After Visit Summary Signature Page    I have received my discharge instructions, and my questions have been answered. I have discussed any challenges I see with this plan with the nurse or doctor.    ..........................................................................................................................................  Patient/Patient Representative Signature      ..........................................................................................................................................  Patient Representative Print Name and Relationship to Patient    ..................................................               ................................................  Date                                   Time    ..........................................................................................................................................  Reviewed by Signature/Title    ...................................................              ..............................................  Date                                               Time          22EPIC Rev 08/18

## 2019-08-25 NOTE — ED PROVIDER NOTES
"  History   Chief Complaint:  Wrist injury    HPI   Laure Mendez is a 44 year old right handed female who presents to the emergency department today with a left wrist injury. The patient states she was dancing last night in tall shoes when she came across a wet surface on fell backward on her wrist.    Allergies:  Bactrim     Medications:    Ava FE 1/20  Zoloft    Past Medical History:    Anxiety  Acute Laryngitis  Allergic rhinitis    Past Surgical History:    cervical Lymph node bx-related to tonsils  Tonsillectomy  Mouth surgery  Soft tissue surgery    Family History:    Thyroid disease  Hypertension  Asthma  CAD  pulmonary embolism     Social History:  Negative for tobacco use.   Positive for alcohol use. Comment: 1-2 drinks everyday  Marital Status:   [2]     Review of Systems   Musculoskeletal: Positive for arthralgias (left wrist).   All other systems reviewed and are negative.        Physical Exam   First Vitals:  BP: 131/85  Heart Rate: 79  Resp: 16  Height: 165.1 cm (5' 5\")  Weight: 77.1 kg (170 lb)  SpO2: 100 %      Physical Exam  General:  No respiratory distress    Cardiovascular: Good cap refill.    Respiratory: Breathing non labored.     Musculoskeletal: No tenderness. No bony deformity. Right lateral wrist with pain over the snuff box region    Skin: No rashes or petechiae     Neurologic: non focal      Psychiatric: Appropriate     Emergency Department Course   Imaging:  Radiographic findings were communicated with the patient who voiced understanding of the findings.    Procedure:   Splint Procedure Note:    Splint type: Thumb Spica Splint & Volar splint  Material: Ortho glass  Location: Right Wrist  Indication: Fracture  Time: 1106    Performed by: Carlos Eduardo Connor MD    Procedure: Cast padding applied to skin with particular attention applied to bony prominences, splint applied in usual fashion.  Post splint sensation, motor, cap refill intact.  Potential complications from " splinting were discussed with patient including signs splint is too tight causing compartment syndrome or ischemia including: persistent uncontrolled pain, sensory changes/loss, discoloration of distal portions of the affected extremity.  The patient verbalized understanding and agreement.      XR Wrist Right G/E 3 views:   Longitudinal fracture distal dorsal radial metaphysis and  epiphysis. There is minimal dorsal displacement of fracture in the  lateral projection. No other acute-appearing abnormality or  significant degenerative change, as per radiology.     Emergency Department Course:  Nursing notes and vitals reviewed. (6677) I performed an exam of the patient as documented above.     The patient was sent for a right wrist xray while in the emergency department, findings above.      (0436) I rechecked the patient and discussed the results of her workup thus far.      Findings and plan explained to the Patient. Patient discharged home with instructions regarding supportive care, medications, and reasons to return. The importance of close follow-up was reviewed.     I personally and answered all related questions prior to discharge.     Impression & Plan    Medical Decision Making:  Laure Mendez is a 44 year old female who had fallen at a wedding and complains of right wrist pain. I was concerned about the potential for scaphoid or a navicular fracture and was planning on splinting her anyway. She unfortunately had a distal radius fracture which was minimally displaced. I splinted her with both a thumb spica and volar splint at which I felt adequately immobilized her, and she was discharged to follow up with ortho. There was no associated injury, no signs of vascular deficit.    Diagnosis:    ICD-10-CM    1. Other closed fracture of distal end of right radius, initial encounter S52.591A        Disposition:  discharged to home    Scribe Disclosure:  Mahdi Margarette SANCHEZ, am serving as a scribe on 8/25/2019 at  9:43 AM to personally document services performed by Carlos Eduardo Connor MD based on my observations and the provider's statements to me.     8/25/2019   [facility] EMERGENCY DEPARTMENT       Carlos Eduardo Connor MD  08/26/19 2011

## 2019-08-25 NOTE — ED TRIAGE NOTES
Pt presents to ED with c/o R wrist pain since falling backwards and catching herself with both hands. Happened at 11pm last night.  Pt states that she has been icing it. No tylenol/ibuprofen. CMS intact. A/O x4.

## 2019-08-29 ENCOUNTER — TRANSFERRED RECORDS (OUTPATIENT)
Dept: HEALTH INFORMATION MANAGEMENT | Facility: CLINIC | Age: 44
End: 2019-08-29

## 2019-08-30 ENCOUNTER — TRANSFERRED RECORDS (OUTPATIENT)
Dept: HEALTH INFORMATION MANAGEMENT | Facility: CLINIC | Age: 44
End: 2019-08-30

## 2019-09-09 ENCOUNTER — OFFICE VISIT (OUTPATIENT)
Dept: FAMILY MEDICINE | Facility: CLINIC | Age: 44
End: 2019-09-09
Payer: COMMERCIAL

## 2019-09-09 VITALS
DIASTOLIC BLOOD PRESSURE: 82 MMHG | HEART RATE: 69 BPM | OXYGEN SATURATION: 100 % | SYSTOLIC BLOOD PRESSURE: 122 MMHG | WEIGHT: 181 LBS | TEMPERATURE: 98.1 F | BODY MASS INDEX: 30.16 KG/M2 | HEIGHT: 65 IN

## 2019-09-09 DIAGNOSIS — F41.9 ANXIETY: Primary | ICD-10-CM

## 2019-09-09 DIAGNOSIS — F32.5 MAJOR DEPRESSION IN COMPLETE REMISSION (H): ICD-10-CM

## 2019-09-09 DIAGNOSIS — Z23 NEED FOR PROPHYLACTIC VACCINATION AND INOCULATION AGAINST INFLUENZA: ICD-10-CM

## 2019-09-09 PROCEDURE — 96127 BRIEF EMOTIONAL/BEHAV ASSMT: CPT | Performed by: PHYSICIAN ASSISTANT

## 2019-09-09 PROCEDURE — 90686 IIV4 VACC NO PRSV 0.5 ML IM: CPT | Performed by: PHYSICIAN ASSISTANT

## 2019-09-09 PROCEDURE — 99214 OFFICE O/P EST MOD 30 MIN: CPT | Mod: 25 | Performed by: PHYSICIAN ASSISTANT

## 2019-09-09 PROCEDURE — 90471 IMMUNIZATION ADMIN: CPT | Performed by: PHYSICIAN ASSISTANT

## 2019-09-09 ASSESSMENT — ANXIETY QUESTIONNAIRES
6. BECOMING EASILY ANNOYED OR IRRITABLE: SEVERAL DAYS
GAD7 TOTAL SCORE: 1
3. WORRYING TOO MUCH ABOUT DIFFERENT THINGS: NOT AT ALL
2. NOT BEING ABLE TO STOP OR CONTROL WORRYING: NOT AT ALL
IF YOU CHECKED OFF ANY PROBLEMS ON THIS QUESTIONNAIRE, HOW DIFFICULT HAVE THESE PROBLEMS MADE IT FOR YOU TO DO YOUR WORK, TAKE CARE OF THINGS AT HOME, OR GET ALONG WITH OTHER PEOPLE: NOT DIFFICULT AT ALL
5. BEING SO RESTLESS THAT IT IS HARD TO SIT STILL: NOT AT ALL
7. FEELING AFRAID AS IF SOMETHING AWFUL MIGHT HAPPEN: NOT AT ALL
1. FEELING NERVOUS, ANXIOUS, OR ON EDGE: NOT AT ALL

## 2019-09-09 ASSESSMENT — MIFFLIN-ST. JEOR: SCORE: 1476.89

## 2019-09-09 ASSESSMENT — PATIENT HEALTH QUESTIONNAIRE - PHQ9
5. POOR APPETITE OR OVEREATING: NOT AT ALL
SUM OF ALL RESPONSES TO PHQ QUESTIONS 1-9: 3

## 2019-09-09 NOTE — PATIENT INSTRUCTIONS
Glad things are looking up aside from your wrist fracture.  Continue zoloft without changes.  Re-assess once you're back to your normal routine as will be a better indicator when you have your typical stressors.

## 2019-09-09 NOTE — LETTER
My Depression Action Plan  Name: Laure Mendez   Date of Birth 1975  Date: 9/9/2019    My doctor: Fide Vera   My clinic: [facility] [place]  [address]  [place] [id number] [phone]          GREEN    ZONE   Good Control    What it looks like:     Things are going generally well. You have normal up s and down s. You may even feel depressed from time to time, but bad moods usually last less than a day.   What you need to do:  1. Continue to care for yourself (see self care plan)  2. Check your depression survival kit and update it as needed  3. Follow your physician s recommendations including any medication.  4. Do not stop taking medication unless you consult with your physician first.           YELLOW         ZONE Getting Worse    What it looks like:     Depression is starting to interfere with your life.     It may be hard to get out of bed; you may be starting to isolate yourself from others.    Symptoms of depression are starting to last most all day and this has happened for several days.     You may have suicidal thoughts but they are not constant.   What you need to do:     1. Call your care team, your response to treatment will improve if you keep your care team informed of your progress. Yellow periods are signs an adjustment may need to be made.     2. Continue your self-care, even if you have to fake it!    3. Talk to someone in your support network    4. Open up your depression survival kit           RED    ZONE Medical Alert - Get Help    What it looks like:     Depression is seriously interfering with your life.     You may experience these or other symptoms: You can t get out of bed most days, can t work or engage in other necessary activities, you have trouble taking care of basic hygiene, or basic responsibilities, thoughts of suicide or death that will not go away, self-injurious behavior.     What you need to do:  1. Call your care team and  request a same-day appointment. If they are not available (weekends or after hours) call your local crisis line, emergency room or 911.            Depression Self Care Plan / Survival Kit    Self-Care for Depression  Here s the deal. Your body and mind are really not as separate as most people think.  What you do and think affects how you feel and how you feel influences what you do and think. This means if you do things that people who feel good do, it will help you feel better.  Sometimes this is all it takes.  There is also a place for medication and therapy depending on how severe your depression is, so be sure to consult with your medical provider and/ or Behavioral Health Consultant if your symptoms are worsening or not improving.     In order to better manage my stress, I will:    Exercise  Get some form of exercise, every day. This will help reduce pain and release endorphins, the  feel good  chemicals in your brain. This is almost as good as taking antidepressants!  This is not the same as joining a gym and then never going! (they count on that by the way ) It can be as simple as just going for a walk or doing some gardening, anything that will get you moving.      Hygiene   Maintain good hygiene (Get out of bed in the morning, Make your bed, Brush your teeth, Take a shower, and Get dressed like you were going to work, even if you are unemployed).  If your clothes don't fit try to get ones that do.    Diet  I will strive to eat foods that are good for me, drink plenty of water, and avoid excessive sugar, caffeine, alcohol, and other mood-altering substances.  Some foods that are helpful in depression are: complex carbohydrates, B vitamins, flaxseed, fish or fish oil, fresh fruits and vegetables.    Psychotherapy  I agree to participate in Individual Therapy (if recommended).    Medication  If prescribed medications, I agree to take them.  Missing doses can result in serious side effects.  I understand that  drinking alcohol, or other illicit drug use, may cause potential side effects.  I will not stop my medication abruptly without first discussing it with my provider.    Staying Connected With Others  I will stay in touch with my friends, family members, and my primary care provider/team.    Use your imagination  Be creative.  We all have a creative side; it doesn t matter if it s oil painting, sand castles, or mud pies! This will also kick up the endorphins.    Witness Beauty  (AKA stop and smell the roses) Take a look outside, even in mid-winter. Notice colors, textures. Watch the squirrels and birds.     Service to others  Be of service to others.  There is always someone else in need.  By helping others we can  get out of ourselves  and remember the really important things.  This also provides opportunities for practicing all the other parts of the program.    Humor  Laugh and be silly!  Adjust your TV habits for less news and crime-drama and more comedy.    Control your stress  Try breathing deep, massage therapy, biofeedback, and meditation. Find time to relax each day.     My support system    Clinic Contact:  Phone number:    Contact 1:  Phone number:    Contact 2:  Phone number:    Christian/:  Phone number:    Therapist:  Phone number:    Local crisis center:    Phone number:    Other community support:  Phone number:

## 2019-09-09 NOTE — PROGRESS NOTES
"Subjective     Laure Mendez is a 44 year old female who presents to clinic today for the following health issues:    Interval recheck - unfortunately fell while dancing and fractured her R distal radius. Saw ortho and also was found to have that fracture was fraying her extensor pollicis longus so wound up having to have repositioning of this tendon to prevent premature rupture. Requires ongoing care with OT and has restrictions from work until can utilize her hand appropriately. Is R-handed. On short-term disability until is cleared to go back again. Estimating Oct/Nov.       HPI   Depression and Anxiety Follow-Up; overall is feeling pretty good, but isn't sure if it's really due to medication versus that she has been off of work and her stress is lower. Hasn't been able to get back into exercise since wrist fracture. Was considering getting back on elliptical, but will do once she is healed. Feels she is more interested in doing things, but feels this could be because she has more time on her hands so has been \"wracking my brain for things I can do.\"   Emotionally does feel better though too.   Tolerates well without any issues.       How are you doing with your depression since your last visit? Improved     How are you doing with your anxiety since your last visit?  Improved     Are you having other symptoms that might be associated with depression or anxiety? No    Have you had a significant life event? Out of work for a few weeks - because of a fracture on her arm      Do you have any concerns with your use of alcohol or other drugs? No    Social History     Tobacco Use     Smoking status: Never Smoker     Smokeless tobacco: Never Used   Substance Use Topics     Alcohol use: Yes     Comment: every day 1-2 drinks      Drug use: No     PHQ 7/13/2017 7/24/2019 9/9/2019   PHQ-9 Total Score 4 15 3   Q9: Thoughts of better off dead/self-harm past 2 weeks Not at all Not at all Not at all     CRISTINA-7 SCORE 7/13/2017 " 7/24/2019 9/9/2019   Total Score - - -   Total Score - - -   Total Score 3 10 1     No flowsheet data found.  No flowsheet data found.      Suicide Assessment Five-step Evaluation and Treatment (SAFE-T)      How many servings of fruits and vegetables do you eat daily?  4 or more    On average, how many sweetened beverages do you drink each day (soda, juice, sweet tea, etc)?   0  How many days per week do you miss taking your medication? 1    What makes it hard for you to take your medications?  due other scheduling issues since having recently fractured her wrist    Patient Active Problem List   Diagnosis     CARDIOVASCULAR SCREENING; LDL GOAL LESS THAN 160     Anxiety     Elevated LDL cholesterol level     Chronic non-seasonal allergic rhinitis, unspecified trigger     Overweight (BMI 25.0-29.9)     Major depression in complete remission (H)     Past Surgical History:   Procedure Laterality Date     C NONSPECIFIC PROCEDURE      wisdom teeth     C NONSPECIFIC PROCEDURE      cervical Lymph node bx-related to tonsils     C NONSPECIFIC PROCEDURE      Tonsillectomy     C NONSPECIFIC PROCEDURE  2000    colposcopy s/p ASCUS     MOUTH SURGERY       SOFT TISSUE SURGERY         Social History     Tobacco Use     Smoking status: Never Smoker     Smokeless tobacco: Never Used   Substance Use Topics     Alcohol use: Yes     Comment: every day 1-2 drinks      Family History   Problem Relation Age of Onset     Lipids Mother      Thyroid Disease Mother         lobectomy for thyroid nodule     Hypertension Father      Asthma Father      Allergies Father         Asthma     Coronary Artery Disease Father         stent     Pacemaker Father      Allergies Sister         excercise induced asthma     Cardiovascular Paternal Grandfather         PE     Coronary Artery Disease Maternal Grandfather         multiple MI     Genitourinary Problems Sister         multi ASCUS paps     Breast Cancer Paternal Aunt 80     Breast Cancer Paternal Aunt  "80     Diabetes No family hx of      Colon Cancer No family hx of          Current Outpatient Medications   Medication Sig Dispense Refill     fluticasone (FLONASE) 50 MCG/ACT nasal spray INHALE 2 SPRAYS IN EACH NOSTRIL DAILY 48 g 1     fluticasone (FLONASE) 50 MCG/ACT spray USE TWO SPRAYS IN EACH NOSTRIL EVERY DAY 48 g 2     HANANE 1/20 1-20 MG-MCG tablet TAKE ONE TABLET BY MOUTH ONCE DAILY CONTINUOUSLY 91 tablet 3     loratadine (CLARITIN) 10 MG tablet Take 10 mg by mouth daily       sertraline (ZOLOFT) 50 MG tablet Take 1/2 tab for 1-2 weeks then increase to 50mg tab thereafter. 90 tablet 0     Vitamin D, Cholecalciferol, 1000 units TABS        Allergies   Allergen Reactions     Bactrim [Sulfamethoxazole W-Trimethoprim]        Reviewed and updated as needed this visit by Provider  Tobacco  Allergies  Meds  Problems  Med Hx  Surg Hx  Fam Hx  Soc Hx          Review of Systems   ROS COMP: Constitutional, HEENT, cardiovascular, pulmonary, gi and gu, neuro, psych, MSK systems are negative, except as otherwise noted.      Objective    /82 (BP Location: Right arm, Cuff Size: Adult Regular)   Pulse 69   Temp 98.1  F (36.7  C) (Oral)   Ht 1.651 m (5' 5\")   Wt 82.1 kg (181 lb)   SpO2 100%   BMI 30.12 kg/m    Body mass index is 30.12 kg/m .  Physical Exam   GENERAL: healthy, alert and no distress  NEURO: Normal strength and tone, mentation intact and speech normal  PSYCH: mentation appears normal, affect normal/bright  MSK: R wrist splint in place    Diagnostic Test Results:  Labs reviewed in Epic  See flowsheets for PHQ/CRISTINA scores          Assessment & Plan       ICD-10-CM    1. Anxiety F41.9    2. Need for prophylactic vaccination and inoculation against influenza Z23 HC FLU VAC PRESRV FREE QUAD SPLIT VIR > 6 MONTHS IM [84071]     Vaccine Administration, Initial [63922]   3. Major depression in complete remission (H) F32.5    Overall improvement in mood since starting zoloft, but recently on short-term " disability after R wrist fracture and having overall less stress from work as a result so not sure if some of her improvement is also due to circumstances.  Tolerating zoloft well so will continue without changes for now. Will let me know when needs refilled and we will touch base again after she has transitioned back to work and her usual routine. Pt estimates this will be anytime after January so ok to recheck anytime after that.  Updated flu vaccine while here today as well.   See Patient Instructions  Patient in agreement with plan.     Patient Instructions   Glad things are looking up aside from your wrist fracture.  Continue zoloft without changes.  Re-assess once you're back to your normal routine as will be a better indicator when you have your typical stressors.      Return in about 6 months (around 3/9/2020) for recheck mood - ok to do via phone.    PRERNA VelascoC  [facility]

## 2019-09-10 ASSESSMENT — ANXIETY QUESTIONNAIRES: GAD7 TOTAL SCORE: 1

## 2019-09-16 ENCOUNTER — HOSPITAL ENCOUNTER (OUTPATIENT)
Dept: MAMMOGRAPHY | Facility: CLINIC | Age: 44
Discharge: HOME | End: 2019-09-16
Attending: PHYSICIAN ASSISTANT | Admitting: PHYSICIAN ASSISTANT
Payer: COMMERCIAL

## 2019-09-16 DIAGNOSIS — Z12.31 VISIT FOR SCREENING MAMMOGRAM: ICD-10-CM

## 2019-09-16 PROCEDURE — 77063 BREAST TOMOSYNTHESIS BI: CPT

## 2019-09-21 NOTE — RESULT ENCOUNTER NOTE
Result(s) was/were reviewed with the patient by radiology.  Electronically Signed By: Fide Vera PA-C

## 2019-10-04 ENCOUNTER — TRANSFERRED RECORDS (OUTPATIENT)
Dept: HEALTH INFORMATION MANAGEMENT | Facility: CLINIC | Age: 44
End: 2019-10-04

## 2019-10-23 DIAGNOSIS — F32.1 MODERATE MAJOR DEPRESSION (H): ICD-10-CM

## 2019-10-23 DIAGNOSIS — F41.9 ANXIETY: ICD-10-CM

## 2019-10-23 NOTE — TELEPHONE ENCOUNTER
OV notes reviewed, patient to continue on Zoloft with recheck in 3/2020. Prescription approved per [facility] RN Refill Protocol. Emilia Mireles R.N.

## 2019-11-26 ENCOUNTER — OFFICE VISIT (OUTPATIENT)
Dept: FAMILY MEDICINE | Facility: CLINIC | Age: 44
End: 2019-11-26
Payer: COMMERCIAL

## 2019-11-26 VITALS
HEIGHT: 65 IN | SYSTOLIC BLOOD PRESSURE: 128 MMHG | DIASTOLIC BLOOD PRESSURE: 78 MMHG | BODY MASS INDEX: 30.99 KG/M2 | WEIGHT: 186 LBS | TEMPERATURE: 98.6 F | OXYGEN SATURATION: 100 % | HEART RATE: 79 BPM

## 2019-11-26 DIAGNOSIS — Z01.818 PREOP GENERAL PHYSICAL EXAM: Primary | ICD-10-CM

## 2019-11-26 DIAGNOSIS — F32.5 MAJOR DEPRESSION IN COMPLETE REMISSION (H): ICD-10-CM

## 2019-11-26 DIAGNOSIS — G56.03 BILATERAL CARPAL TUNNEL SYNDROME: ICD-10-CM

## 2019-11-26 DIAGNOSIS — F41.9 ANXIETY: ICD-10-CM

## 2019-11-26 PROBLEM — G56.00 CTS (CARPAL TUNNEL SYNDROME): Status: ACTIVE | Noted: 2019-11-01

## 2019-11-26 PROCEDURE — 99214 OFFICE O/P EST MOD 30 MIN: CPT | Performed by: PHYSICIAN ASSISTANT

## 2019-11-26 ASSESSMENT — MIFFLIN-ST. JEOR: SCORE: 1499.57

## 2019-11-26 NOTE — PROGRESS NOTES
[facility]  [address]  [place] [address] [phone]  Dept: [phone]    PRE-OP EVALUATION:  Today's date: 2019    Laure Mendez (: 1975) presents for pre-operative evaluation assessment as requested by Dr. Elise Mendenhall.  She requires evaluation and anesthesia risk assessment prior to undergoing surgery/procedure for treatment of Carpal Tunnel .    Proposed Surgery/ Procedure: Carpal Tunnel - Right Wrist  Date of Surgery/ Procedure: 2019  Time of Surgery/ Procedure:   Hospital/Surgical Facility: [facility]  Fax number for surgical facility: [phone]  Primary Physician: Fide Vera  Type of Anesthesia Anticipated: probably MAC, but may be transitioned to general per pt.    Patient has a Health Care Directive or Living Will:  NO    1. NO - Do you have a history of heart attack, stroke, stent, bypass or surgery on an artery in the head, neck, heart or legs?  2. NO - Do you ever have any pain or discomfort in your chest?  3. NO - Do you have a history of  Heart Failure?  4. NO - Are you troubled by shortness of breath when: walking on the level, up a slight hill or at night?  5. NO - Do you currently have a cold, bronchitis or other respiratory infection?  6. NO - Do you have a cough, shortness of breath or wheezing?  7. NO - Do you sometimes get pains in the calves of your legs when you walk?  8. YES - Do you or anyone in your family have previous history of blood clots? Paternal grandfather had a PE - etiology unclear.  No personal history of clot.  9. NO - Do you or does anyone in your family have a serious bleeding problem such as prolonged bleeding following surgeries or cuts?  10. NO - Have you ever had problems with anemia or been told to take iron pills?  11. NO - Have you had any abnormal blood loss such as black, tarry or bloody stools, or abnormal vaginal bleeding?  12. NO - Have you ever had a blood  transfusion?  13. NO - Have you or any of your relatives ever had problems with anesthesia?  14. NO - Do you have sleep apnea, excessive snoring or daytime drowsiness?  15. NO - Do you have any prosthetic heart valves?  16. NO - Do you have prosthetic joints?  17. NO - Is there any chance that you may be pregnant?      HPI:     HPI related to upcoming procedure: Zohreh is a 45 yo female here today for pre-op. Suffered distal R radial fracture and fraying of her extensor pollicis longus so had repositioning of tendon to prevent premature rupture in August. Since then originally suffered with intermittent CTS bilaterally despite being in a brace. Had EMG showing mild to moderate severity on the R. This has been worsening such that it bothers her with doing hair, driving, writing, sleeping and putting her make-up. Using bilateral braces hasn't helped either. Thus, now pursuing CTS release on the R. Hoping L will improve with using L wrist less. Completing arthroscopically.     Denies pregnancy - on continuous dosing OCPs.     See problem list for active medical problems.  Problems all longstanding and stable, except as noted/documented.  See ROS for pertinent symptoms related to these conditions.      MEDICAL HISTORY:     Patient Active Problem List    Diagnosis Date Noted     Bilateral carpal tunnel syndrome - R>L 11/01/2019     Priority: Medium     Major depression in complete remission (H) 09/09/2019     Priority: Medium     Elevated LDL cholesterol level 07/18/2018     Priority: Medium     Chronic non-seasonal allergic rhinitis, unspecified trigger 07/18/2018     Priority: Medium     Overweight (BMI 25.0-29.9) 07/18/2018     Priority: Medium     Anxiety 07/01/2011     Priority: Medium     CARDIOVASCULAR SCREENING; LDL GOAL LESS THAN 160 05/26/2011     Priority: Medium      Past Medical History:   Diagnosis Date     Acute laryngitis 11/17/2015     Allergic rhinitis 10/14/2003     Problem list name updated by  automated process. Provider to review     Allergic rhinitis, cause unspecified     year round     Anxiety 7/1/2011     ASCUS on Pap smear 2000    ASCUS     Past Surgical History:   Procedure Laterality Date     C NONSPECIFIC PROCEDURE      wisdom teeth     C NONSPECIFIC PROCEDURE      cervical Lymph node bx-related to tonsils     C NONSPECIFIC PROCEDURE      Tonsillectomy     C NONSPECIFIC PROCEDURE  2000    colposcopy s/p ASCUS     MOUTH SURGERY       SOFT TISSUE SURGERY       Current Outpatient Medications   Medication Sig Dispense Refill     fluticasone (FLONASE) 50 MCG/ACT spray USE TWO SPRAYS IN EACH NOSTRIL EVERY DAY 48 g 2     HANANE 1/20 1-20 MG-MCG tablet TAKE ONE TABLET BY MOUTH ONCE DAILY CONTINUOUSLY 91 tablet 3     loratadine (CLARITIN) 10 MG tablet Take 10 mg by mouth daily       sertraline (ZOLOFT) 50 MG tablet TAKE ONE-HALF TABLET (25 MG) BY MOUTH EVERY DAY FOR 1-2 WEEKS THEN INCREASE TO 1 TABLET (50 MG) DAILY THEREAFTER 90 tablet 0     Vitamin D, Cholecalciferol, 1000 units TABS        OTC products: None, except as noted above  Also takes iron 1 week before platelet donation    Allergies   Allergen Reactions     Bactrim [Sulfamethoxazole W-Trimethoprim]       Latex Allergy: YES: Precautions to take: - LATEX can trigger eczema, please avoid.    Social History     Tobacco Use     Smoking status: Never Smoker     Smokeless tobacco: Never Used   Substance Use Topics     Alcohol use: Yes     Comment: every day 1-2 drinks      History   Drug Use No       REVIEW OF SYSTEMS:   CONSTITUTIONAL: NEGATIVE for fever, chills, change in weight  INTEGUMENTARY/SKIN: NEGATIVE for worrisome rashes, moles or lesions  EYES: NEGATIVE for vision changes or irritation  ENT/MOUTH: NEGATIVE for ear, mouth and throat problems  RESP: NEGATIVE for significant cough or SOB  CV: NEGATIVE for chest pain, palpitations or peripheral edema  GI: NEGATIVE for nausea, abdominal pain, heartburn, or change in bowel habits  : NEGATIVE for  "frequency, dysuria, or hematuria  MUSCULOSKELETAL: + for CTS bilaterally. NEGATIVE for significant arthralgias or myalgia  NEURO: NEGATIVE for weakness, dizziness or paresthesias  ENDOCRINE: NEGATIVE for temperature intolerance, skin/hair changes  HEME: NEGATIVE for bleeding problems  PSYCHIATRIC: NEGATIVE for changes in mood or affect    EXAM:   /78 (BP Location: Right arm, Cuff Size: Adult Regular)   Pulse 79   Temp 98.6  F (37  C) (Oral)   Ht 1.651 m (5' 5\")   Wt 84.4 kg (186 lb)   SpO2 100%   BMI 30.95 kg/m      GENERAL APPEARANCE: healthy, alert and no distress     EYES: EOMI, PERRL     HENT: ear canals and TM's normal and nose and mouth without ulcers or lesions     NECK: no adenopathy, no asymmetry, masses, or scars and thyroid normal to palpation     RESP: lungs clear to auscultation - no rales, rhonchi or wheezes     CV: regular rates and rhythm, normal S1 S2, no S3 or S4 and no murmur, click or rub     ABDOMEN:  soft, nontender, no HSM or masses and bowel sounds normal     MS: extremities normal- no gross deformities noted, no evidence of inflammation in joints, FROM in all extremities.     SKIN: no suspicious lesions or rashes     NEURO: Normal strength and tone, sensory exam grossly normal, mentation intact and speech normal     PSYCH: mentation appears normal. and affect normal/bright     LYMPHATICS: No cervical adenopathy    DIAGNOSTICS:   EKG: Not indicated due to non-vascular surgery and low risk of event (age <65 and without cardiac risk factors)    Recent Labs   Lab Test 07/31/19  0938 04/26/16  0741 08/13/15  1344   HGB  --  12.2 12.6   PLT  --  316 303    138  --    POTASSIUM 4.1 4.1  --    CR 0.85 0.71  --    Pt reports hgb of 14.6 after donating platelets yesterday.     IMPRESSION:   Reason for surgery/procedure: CTS release  Diagnosis/reason for consult: pre-op, on OCPs for contraception, anxiety/depression well controlled.    The proposed surgical procedure is considered " INTERMEDIATE risk.    REVISED CARDIAC RISK INDEX  The patient has the following serious cardiovascular risks for perioperative complications such as (MI, PE, VFib and 3  AV Block):  No serious cardiac risks  INTERPRETATION: 0 risks: Class I (very low risk - 0.4% complication rate)    The patient has the following additional risks for perioperative complications:  No identified additional risks      ICD-10-CM    1. Preop general physical exam Z01.818    2. Bilateral carpal tunnel syndrome - R>L G56.03    3. Major depression in complete remission (H) F32.5    4. Anxiety F41.9        RECOMMENDATIONS:       APPROVAL GIVEN to proceed with proposed procedure, without further diagnostic evaluation.       Signed Electronically by: Fide Vera PA-C    Copy of this evaluation report is provided to requesting physician.    [facility] Preop Guidelines    Revised Cardiac Risk Index

## 2020-01-27 DIAGNOSIS — F32.1 MODERATE MAJOR DEPRESSION (H): ICD-10-CM

## 2020-01-27 DIAGNOSIS — F41.9 ANXIETY: ICD-10-CM

## 2020-01-27 NOTE — TELEPHONE ENCOUNTER
"Requested Prescriptions   Pending Prescriptions Disp Refills     sertraline (ZOLOFT) 50 MG tablet [Pharmacy Med Name: SERTRALINE HCL 50MG TABS]  Last Written Prescription Date:  10/23/2019  Last Fill Quantity: 90 tablet,  # refills: 0   Last office visit: 11/26/2019 with prescribing provider:  Jody     Future Office Visit:       90 tablet 0     Sig: TAKE ONE-HALF TABLET (25 MG) BY MOUTH EVERY DAY FOR 1-2 WEEKS THEN INCREASE TO 1 TABLET (50 MG) DAILY THEREAFTER       SSRIs Protocol Passed - 1/27/2020  7:10 AM        Passed - PHQ-9 score less than 5 in past 6 months     Please review last PHQ-9 score.     PHQ-9 SCORE 7/13/2017 7/24/2019 9/9/2019   PHQ-9 Total Score - - -   PHQ-9 Total Score MyChart - - -   PHQ-9 Total Score 4 15 3     CRISTINA-7 SCORE 7/13/2017 7/24/2019 9/9/2019   Total Score - - -   Total Score - - -   Total Score 3 10 1           Passed - Medication is active on med list        Passed - Patient is age 18 or older        Passed - No active pregnancy on record        Passed - No positive pregnancy test in last 12 months        Passed - Recent (6 mo) or future (30 days) visit within the authorizing provider's specialty     Patient had office visit in the last 6 months or has a visit in the next 30 days with authorizing provider or within the authorizing provider's specialty.  See \"Patient Info\" tab in inbasket, or \"Choose Columns\" in Meds & Orders section of the refill encounter.            "

## 2020-01-28 NOTE — TELEPHONE ENCOUNTER
Prescription approved per [facility] Refill Protocol.  VIRIDIANA ShirleyN, RN  [facility]

## 2020-04-23 ENCOUNTER — E-VISIT (OUTPATIENT)
Dept: FAMILY MEDICINE | Facility: CLINIC | Age: 45
End: 2020-04-23
Payer: COMMERCIAL

## 2020-04-23 DIAGNOSIS — F41.9 ANXIETY: ICD-10-CM

## 2020-04-23 DIAGNOSIS — F32.1 MODERATE MAJOR DEPRESSION (H): ICD-10-CM

## 2020-04-23 PROCEDURE — 99421 OL DIG E/M SVC 5-10 MIN: CPT | Performed by: PHYSICIAN ASSISTANT

## 2020-04-23 NOTE — TELEPHONE ENCOUNTER
Patient due for virtual visit. [employer] message sent to patient advising of this. Refill can be provided during visit.  ALTON Shirley, RN  [facility]

## 2020-04-24 ENCOUNTER — MYC MEDICAL ADVICE (OUTPATIENT)
Dept: FAMILY MEDICINE | Facility: CLINIC | Age: 45
End: 2020-04-24

## 2020-04-24 DIAGNOSIS — F41.9 ANXIETY: Primary | ICD-10-CM

## 2020-04-24 DIAGNOSIS — F32.5 MAJOR DEPRESSION IN COMPLETE REMISSION (H): ICD-10-CM

## 2020-04-24 RX ORDER — BUPROPION HYDROCHLORIDE 150 MG/1
150 TABLET ORAL EVERY MORNING
Qty: 90 TABLET | Refills: 1 | Status: SHIPPED | OUTPATIENT
Start: 2020-04-24 | End: 2020-07-30

## 2020-04-24 NOTE — TELEPHONE ENCOUNTER
Please see My Chart message below and advise as appropriate.  Pharmacy is pended.     VIRIDIANA MeyerN, RN  [employer] [facility]

## 2020-07-25 DIAGNOSIS — N94.6 DYSMENORRHEA: ICD-10-CM

## 2020-07-27 RX ORDER — NORETHINDRONE ACETATE/ETHINYL ESTRADIOL 1MG-20MCG
KIT ORAL
Qty: 91 TABLET | Refills: 0 | Status: SHIPPED | OUTPATIENT
Start: 2020-07-27 | End: 2020-07-30

## 2020-07-27 NOTE — TELEPHONE ENCOUNTER
Medication is being filled for 1 time refill only due to:  Patient needs to be seen because it has been more than one year since last visit. Routing to  to assist patient with scheduling. Emilia Mireles R.N.

## 2020-07-29 ENCOUNTER — MYC MEDICAL ADVICE (OUTPATIENT)
Dept: FAMILY MEDICINE | Facility: CLINIC | Age: 45
End: 2020-07-29

## 2020-07-30 ENCOUNTER — E-VISIT (OUTPATIENT)
Dept: FAMILY MEDICINE | Facility: CLINIC | Age: 45
End: 2020-07-30
Payer: COMMERCIAL

## 2020-07-30 DIAGNOSIS — F32.5 MAJOR DEPRESSION IN COMPLETE REMISSION (H): ICD-10-CM

## 2020-07-30 DIAGNOSIS — N94.6 DYSMENORRHEA: ICD-10-CM

## 2020-07-30 DIAGNOSIS — F32.1 MODERATE MAJOR DEPRESSION (H): ICD-10-CM

## 2020-07-30 DIAGNOSIS — F41.9 ANXIETY: ICD-10-CM

## 2020-07-30 RX ORDER — BUPROPION HYDROCHLORIDE 150 MG/1
150 TABLET ORAL EVERY MORNING
Qty: 90 TABLET | Refills: 1 | Status: SHIPPED | OUTPATIENT
Start: 2020-07-30 | End: 2021-02-08

## 2020-07-30 RX ORDER — NORETHINDRONE ACETATE AND ETHINYL ESTRADIOL .02; 1 MG/1; MG/1
TABLET ORAL
Qty: 91 TABLET | Refills: 3 | Status: SHIPPED | OUTPATIENT
Start: 2020-07-30 | End: 2021-09-28

## 2020-07-30 ASSESSMENT — ANXIETY QUESTIONNAIRES
3. WORRYING TOO MUCH ABOUT DIFFERENT THINGS: NOT AT ALL
GAD7 TOTAL SCORE: 3
7. FEELING AFRAID AS IF SOMETHING AWFUL MIGHT HAPPEN: NOT AT ALL
GAD7 TOTAL SCORE: 3
2. NOT BEING ABLE TO STOP OR CONTROL WORRYING: NOT AT ALL
GAD7 TOTAL SCORE: 3
7. FEELING AFRAID AS IF SOMETHING AWFUL MIGHT HAPPEN: NOT AT ALL
1. FEELING NERVOUS, ANXIOUS, OR ON EDGE: SEVERAL DAYS
4. TROUBLE RELAXING: SEVERAL DAYS
5. BEING SO RESTLESS THAT IT IS HARD TO SIT STILL: NOT AT ALL
6. BECOMING EASILY ANNOYED OR IRRITABLE: SEVERAL DAYS

## 2020-07-30 ASSESSMENT — PATIENT HEALTH QUESTIONNAIRE - PHQ9
10. IF YOU CHECKED OFF ANY PROBLEMS, HOW DIFFICULT HAVE THESE PROBLEMS MADE IT FOR YOU TO DO YOUR WORK, TAKE CARE OF THINGS AT HOME, OR GET ALONG WITH OTHER PEOPLE: NOT DIFFICULT AT ALL
SUM OF ALL RESPONSES TO PHQ QUESTIONS 1-9: 3
SUM OF ALL RESPONSES TO PHQ QUESTIONS 1-9: 3

## 2020-07-31 ASSESSMENT — PATIENT HEALTH QUESTIONNAIRE - PHQ9: SUM OF ALL RESPONSES TO PHQ QUESTIONS 1-9: 3

## 2020-07-31 ASSESSMENT — ANXIETY QUESTIONNAIRES: GAD7 TOTAL SCORE: 3

## 2020-08-24 ENCOUNTER — OFFICE VISIT (OUTPATIENT)
Dept: FAMILY MEDICINE | Facility: CLINIC | Age: 45
End: 2020-08-24
Payer: COMMERCIAL

## 2020-08-24 VITALS
TEMPERATURE: 98.2 F | HEART RATE: 67 BPM | BODY MASS INDEX: 30.16 KG/M2 | DIASTOLIC BLOOD PRESSURE: 66 MMHG | HEIGHT: 65 IN | OXYGEN SATURATION: 99 % | SYSTOLIC BLOOD PRESSURE: 118 MMHG | WEIGHT: 181 LBS

## 2020-08-24 DIAGNOSIS — G56.03 BILATERAL CARPAL TUNNEL SYNDROME: ICD-10-CM

## 2020-08-24 DIAGNOSIS — Z12.31 ENCOUNTER FOR SCREENING MAMMOGRAM FOR BREAST CANCER: ICD-10-CM

## 2020-08-24 DIAGNOSIS — F32.5 MAJOR DEPRESSION IN COMPLETE REMISSION (H): ICD-10-CM

## 2020-08-24 DIAGNOSIS — Z01.818 PREOP GENERAL PHYSICAL EXAM: Primary | ICD-10-CM

## 2020-08-24 DIAGNOSIS — F41.9 ANXIETY: ICD-10-CM

## 2020-08-24 PROCEDURE — 99214 OFFICE O/P EST MOD 30 MIN: CPT | Performed by: PHYSICIAN ASSISTANT

## 2020-08-24 RX ORDER — MULTIVITAMIN
1 TABLET ORAL DAILY
COMMUNITY
Start: 2020-08-24

## 2020-08-24 ASSESSMENT — MIFFLIN-ST. JEOR: SCORE: 1471.89

## 2020-08-24 NOTE — PROGRESS NOTES
[facility] [place]  [address] [address] [phone]  Dept: [phone]    PRE-OP EVALUATION:  Today's date: 2020    Laure Mendez (: 1975) presents for pre-operative evaluation assessment as requested by Dr. Mendenhall.  She requires evaluation and anesthesia risk assessment prior to undergoing surgery/procedure for treatment of Lt Hand carpel tunnel .    Fax number for surgical facility: [phone]  Primary Physician: Fide Vera  Type of Anesthesia Anticipated: to be determined    Preop Questionnnaire:  Pre-op Questionnaire 2020   Surgery Location: [facility]   Surgeon: Elise Mendenhall MD   Surgery/Procedure: Arthroscopic left carpal tunnel release   Surgery Date: 2020   Time of Surgery: Noon   Where patient plans to recover: At home with family   Have you ever had a heart attack or stroke? No   Have you ever had surgery on your heart or blood vessels, such as a stent placement, a coronary artery bypass, or surgery on an artery in your head, neck, heart, or legs? No   Do you have chest pain with activity? No   Do you have a history of  heart failure? No   Do you currently have a cold, bronchitis or symptoms of other infection? No   Do you have a cough, shortness of breath, or wheezing? No   Do you or anyone in your family have previous history of blood clots? YES - Paternal grandfather had a PE - etiology unclear.  No personal history of clot.   Do you or does anyone in your family have a serious bleeding problem such as prolonged bleeding following surgeries or cuts? No   Have you ever had problems with anemia or been told to take iron pills? YES - told to take in the past, only uses now for the week before plt donation.    Have you had any abnormal blood loss such as black, tarry or bloody stools, or abnormal vaginal bleeding? No   Have you ever had a blood transfusion? No   Are you willing to have a blood transfusion if it is  medically needed before, during, or after your surgery? YES    Have you or any of your relatives ever had problems with anesthesia? No   Do you have sleep apnea, excessive snoring or daytime drowsiness? No   Do you have any artifical heart valves or other implanted medical devices like a pacemaker, defibrillator, or continuous glucose monitor? No   Do you have artificial joints? No   Are you allergic to latex? YES: aggravates her eczema   Is there any chance that you may be pregnant? No         HPI:     HPI related to upcoming procedure: Zohreh is a 46 yo female here today for pre-op. Completed previous R carpal tunnel release in the past which was very helpful. Thus, now pursuing the L side.     Completed visit with my colleague in short-interval when I was out of office. Had wellbutrin added for sexual side effects noted with sertraline and finding this very helpful. Overall, also felt improvements in energy level, attitude.     Denies any possibility of pregnancy. On OCPs and reports good compliance. Does not get menses on continuous dosing.    See problem list for active medical problems.  Problems all longstanding and stable, except as noted/documented.  See ROS for pertinent symptoms related to these conditions.      MEDICAL HISTORY:     Patient Active Problem List    Diagnosis Date Noted     Bilateral carpal tunnel syndrome - s/p release on R. now pursuing L 11/01/2019     Priority: Medium     Major depression in complete remission (H) 09/09/2019     Priority: Medium     Elevated LDL cholesterol level 07/18/2018     Priority: Medium     Chronic non-seasonal allergic rhinitis, unspecified trigger 07/18/2018     Priority: Medium     Overweight (BMI 25.0-29.9) 07/18/2018     Priority: Medium     Anxiety 07/01/2011     Priority: Medium     CARDIOVASCULAR SCREENING; LDL GOAL LESS THAN 160 05/26/2011     Priority: Medium      Past Medical History:   Diagnosis Date     Acute laryngitis 11/17/2015     Allergic  rhinitis 10/14/2003     Problem list name updated by automated process. Provider to review     Allergic rhinitis, cause unspecified     year round     Anxiety 7/1/2011     ASCUS on Pap smear 2000    ASCUS     Past Surgical History:   Procedure Laterality Date     C NONSPECIFIC PROCEDURE      wisdom teeth     C NONSPECIFIC PROCEDURE      cervical Lymph node bx-related to tonsils     C NONSPECIFIC PROCEDURE      Tonsillectomy     C NONSPECIFIC PROCEDURE  2000    colposcopy s/p ASCUS     MOUTH SURGERY       SOFT TISSUE SURGERY       Current Outpatient Medications   Medication Sig Dispense Refill     multivitamin (ONE-DAILY) tablet Take 1 tablet by mouth daily       buPROPion (WELLBUTRIN XL) 150 MG 24 hr tablet Take 1 tablet (150 mg) by mouth every morning 90 tablet 1     fluticasone (FLONASE) 50 MCG/ACT spray USE TWO SPRAYS IN EACH NOSTRIL EVERY DAY 48 g 2     loratadine (CLARITIN) 10 MG tablet Take 10 mg by mouth daily       norethindrone-ethinyl estradiol (HANANE 1/20) 1-20 MG-MCG tablet TAKE ONE TABLET BY MOUTH ONCE DAILY CONTINUOUSLY 91 tablet 3     sertraline (ZOLOFT) 50 MG tablet Take 1 tablet (50 mg) by mouth daily 90 tablet 1     Vitamin D, Cholecalciferol, 1000 units TABS        OTC products: None, except as noted above    Allergies   Allergen Reactions     Bactrim [Sulfamethoxazole W-Trimethoprim]       Latex Allergy: YES: Precautions to take: NO LATEX    Social History     Tobacco Use     Smoking status: Never Smoker     Smokeless tobacco: Never Used   Substance Use Topics     Alcohol use: Yes     Comment: every day 1-2 drinks      History   Drug Use No       REVIEW OF SYSTEMS:   CONSTITUTIONAL: NEGATIVE for fever, chills, change in weight  INTEGUMENTARY/SKIN: NEGATIVE for worrisome rashes, moles or lesions  EYES: NEGATIVE for vision changes or irritation  ENT/MOUTH: NEGATIVE for ear, mouth and throat problems  RESP: NEGATIVE for significant cough or SOB  BREAST: NEGATIVE for masses, tenderness or  "discharge  CV: NEGATIVE for chest pain, palpitations or peripheral edema  GI: NEGATIVE for nausea, abdominal pain, heartburn, or change in bowel habits  : NEGATIVE for frequency, dysuria, or hematuria  MUSCULOSKELETAL: + for CTS of L wrist. NEGATIVE for significant arthralgias or myalgia  NEURO: NEGATIVE for weakness, dizziness or paresthesias  ENDOCRINE: NEGATIVE for temperature intolerance, skin/hair changes  HEME: NEGATIVE for bleeding problems  PSYCHIATRIC: NEGATIVE for changes in mood or affect    EXAM:   /66   Pulse 67   Temp 98.2  F (36.8  C) (Oral)   Ht 1.651 m (5' 5\")   Wt 82.1 kg (181 lb)   SpO2 99%   BMI 30.12 kg/m      GENERAL APPEARANCE: healthy, alert and no distress     EYES: EOMI, PERRL     HENT: ear canals and TM's normal and nose and mouth without ulcers or lesions     NECK: no adenopathy, no asymmetry, masses, or scars and thyroid normal to palpation     RESP: lungs clear to auscultation - no rales, rhonchi or wheezes     CV: regular rates and rhythm, normal S1 S2, no S3 or S4 and no murmur, click or rub     ABDOMEN:  soft, nontender, no HSM or masses and bowel sounds normal     MS: extremities normal- no gross deformities noted, no evidence of inflammation in joints, FROM in all extremities.     SKIN: no suspicious lesions or rashes     NEURO: Normal strength and tone, sensory exam grossly normal, mentation intact and speech normal     PSYCH: mentation appears normal. and affect normal/bright     LYMPHATICS: No cervical adenopathy    DIAGNOSTICS:   EKG: Not indicated due to non-vascular surgery and low risk of event (age <65 and without cardiac risk factors)    Recent Labs   Lab Test 07/31/19  0938 04/26/16  0741 08/13/15  1344   HGB  --  12.2 12.6   PLT  --  316 303    138  --    POTASSIUM 4.1 4.1  --    CR 0.85 0.71  --     14.6 hgb in 11/2019    IMPRESSION:   Reason for surgery/procedure: CTS release   Diagnosis/reason for consult: pre-op, on OCPs for contraception, " anxiety/depression well controlled.    The proposed surgical procedure is considered INTERMEDIATE risk.    REVISED CARDIAC RISK INDEX  The patient has the following serious cardiovascular risks for perioperative complications such as (MI, PE, VFib and 3  AV Block):  No serious cardiac risks  INTERPRETATION: 0 risks: Class I (very low risk - 0.4% complication rate)    The patient has the following additional risks for perioperative complications:  No identified additional risks      ICD-10-CM    1. Preop general physical exam  Z01.818    2. Bilateral carpal tunnel syndrome - s/p release on R. now pursuing L  G56.03    3. Major depression in complete remission (H)  F32.5    4. Anxiety  F41.9    5. Encounter for screening mammogram for breast cancer  Z12.31 MA Screen Bilateral w/Jose       RECOMMENDATIONS:       --Patient is to take all scheduled medications on the day of surgery    APPROVAL GIVEN to proceed with proposed procedure, without further diagnostic evaluation       Signed Electronically by: Fide Vera PA-C    Copy of this evaluation report is provided to requesting physician.    [facility] Preop Guidelines    Revised Cardiac Risk Index

## 2020-08-24 NOTE — PATIENT INSTRUCTIONS

## 2020-08-27 ENCOUNTER — TELEPHONE (OUTPATIENT)
Dept: FAMILY MEDICINE | Facility: CLINIC | Age: 45
End: 2020-08-27

## 2020-08-27 NOTE — TELEPHONE ENCOUNTER
Patient calling, asking preop needs to be faxed to surgery center. Preop on 8/24/20. Surgery date9/3/20  Fax # [phone] to [facility] [place]    Thank you  Adriana Craft

## 2020-11-22 ENCOUNTER — HEALTH MAINTENANCE LETTER (OUTPATIENT)
Age: 45
End: 2020-11-22

## 2021-01-07 ENCOUNTER — OFFICE VISIT (OUTPATIENT)
Dept: URGENT CARE | Facility: URGENT CARE | Age: 46
End: 2021-01-07
Payer: COMMERCIAL

## 2021-01-07 ENCOUNTER — HOSPITAL ENCOUNTER (EMERGENCY)
Facility: CLINIC | Age: 46
Discharge: HOME | End: 2021-01-07
Attending: EMERGENCY MEDICINE | Admitting: EMERGENCY MEDICINE
Payer: COMMERCIAL

## 2021-01-07 VITALS
RESPIRATION RATE: 12 BRPM | TEMPERATURE: 96.4 F | SYSTOLIC BLOOD PRESSURE: 136 MMHG | WEIGHT: 186 LBS | HEIGHT: 65 IN | BODY MASS INDEX: 30.99 KG/M2 | HEART RATE: 63 BPM | OXYGEN SATURATION: 99 % | DIASTOLIC BLOOD PRESSURE: 91 MMHG

## 2021-01-07 VITALS
BODY MASS INDEX: 31 KG/M2 | WEIGHT: 186.1 LBS | SYSTOLIC BLOOD PRESSURE: 130 MMHG | HEIGHT: 65 IN | TEMPERATURE: 98.2 F | HEART RATE: 63 BPM | RESPIRATION RATE: 16 BRPM | DIASTOLIC BLOOD PRESSURE: 88 MMHG | OXYGEN SATURATION: 98 %

## 2021-01-07 DIAGNOSIS — S60.459A FINGER, SUPERFICIAL FOREIGN BODY (SPLINTER), INITIAL ENCOUNTER: ICD-10-CM

## 2021-01-07 DIAGNOSIS — S60.459A FOREIGN BODY OF FINGER OF RIGHT HAND, INITIAL ENCOUNTER: Primary | ICD-10-CM

## 2021-01-07 PROCEDURE — 99282 EMERGENCY DEPT VISIT SF MDM: CPT

## 2021-01-07 PROCEDURE — 99207 PR NO CHARGE LOS: CPT | Performed by: PHYSICIAN ASSISTANT

## 2021-01-07 RX ORDER — CEPHALEXIN 500 MG/1
500 CAPSULE ORAL 3 TIMES DAILY
Qty: 21 CAPSULE | Refills: 0 | Status: SHIPPED | OUTPATIENT
Start: 2021-01-07 | End: 2021-01-14

## 2021-01-07 RX ORDER — CEPHALEXIN 500 MG/1
500 CAPSULE ORAL 3 TIMES DAILY
Qty: 28 CAPSULE | Refills: 0 | Status: SHIPPED | OUTPATIENT
Start: 2021-01-07 | End: 2021-01-07

## 2021-01-07 ASSESSMENT — MIFFLIN-ST. JEOR
SCORE: 1495.02
SCORE: 1494.57

## 2021-01-07 ASSESSMENT — ENCOUNTER SYMPTOMS: WOUND: 1

## 2021-01-07 NOTE — ED PROVIDER NOTES
"  History   Chief Complaint:  Foreign Body in Skin       HPI   Laure Mendez is a 45 year old female who presents with a foreign body in her skin. The patient reported that while trying to move a cabinet she swept her hand over it causing a large wood splinter to go into the pad of her right middle finger. She was able to remove a small piece of this splinter on her own but as there appears to be more in her finger she presented to the ED for evaluation. Per [facility], her tetanus was last updated in 2013.        Review of Systems   Skin: Positive for wound.   All other systems reviewed and are negative.        Allergies:  Bactrim [Sulfamethoxazole W-Trimethoprim]    Medications:  Wellbutrin   Norethindrone-ethinyl estradiol  Zoloft      Past Medical History:    Anxiety   Bilateral carpal tunnel   Major depression   Chronic non-seasonal allergic rhinitis     Past Surgical History:    Mouth surgery   Soft tissue surgery   [place] teeth   Cervical lymph node biopsy   Tonsillectomy   Colposcopy s/p ASCUS     Family History:    Lipids   Thyroid disease   Asthma   CAD    Social History:  Presents to the ED: Unaccompanied   Occupation: Surgical tech      Physical Exam     Patient Vitals for the past 24 hrs:   BP Temp Temp src Pulse Resp SpO2 Height Weight   01/07/21 1756 (!) 136/91 -- -- 63 12 99 % -- --   01/07/21 1628 134/86 96.4  F (35.8  C) Temporal 67 16 100 % 1.651 m (5' 5\") 84.4 kg (186 lb)       Physical Exam  General- alert, cooperative  Pulm- normal respiratory effort, no respiratory distress  Msk- RUE: middle finger with small splint sticking out from tip of the finger. Finger pad with another  wound that showed a small embedded splinter. No nailbed injury. No bleeding2+ pulses,  sensation to light touch intact, no other wounds or abrasions   ROM normal without difficulty, 5/5 strength           LUE: 2+ pulses, sensation to light touch intact, no wounds or abrasions   ROM normal without difficulty, 5/5 " strength  Skin- no cyanosis or edema, no swelling, no rash or petechiae  Psych- normal mood and affect, normal behavior                   Emergency Department Course:    Reviewed:  I reviewed the patient's nursing notes, vitals, past medical history and care everywhere.     Assessments:  1724 I evaluated the patient.   1805 I rechecked the patient and she agreed with the plan to defer on Tetanus vaccine today.       Consults:   1736 I called [facility] and left a referral for the patient.   1747 I consulted with Geovanna, ED pharmacist, regarding appropriate antibiotics to give and whether to administer the tetanus vaccine in the setting of the patient getting her second Covid vaccine in 5 days.   1803 I spoke with Geovanna again. The requirement for the Covid vaccine is no other vaccines in 14 days. So we did not give her the Tetanus vaccine as she is getting her second vaccine in 5 days.     Disposition:  The patient was discharged to home.       Impression & Plan     Medical Decision Making:  Laure Mendez is a 45 year old female who presents with a splinter that is through and through in her finger pad of her right middle finger. Its unclear to me the trajectory of this splinter although the patient indicated most of it is still buried. Since this material is not going to be seen on xray we did not perform that in the ER and I am very hesitant opening up her finger to just dig around and try to find the splinter. She is a patient of Dr. Elise Mendenhall of [facility]. She preferred to see Dr. Mendenhall to get this removed tomorrow. I placed a call with [facility] follow up line and indicated that she needed to be follows up tomorrow urgently. She is comfortable with doing a splint to protect her finger and then start on antibiotics. Her last tetanus was 2013 and we did discuss getting her another update given that is more than 5 years. She is supposed to get her Covid vaccine next Tuesday and she  cannot have any vaccination 14 days prior to that so we decided to hold off on that as getting the second dose of her Covid vaccine is very important. Since she is within the 10 years I do feel it is okay for her to wait on a booster. We did attempt to call [facility] in regards to getting a tetanus along with a Covid vaccine however that phone line was closed for the day. She will have Dr. Mendenhall call tomorrow after seeing her in the office and certainly she can get the tetanus updated tomorrow. She is comfortable with the plan. Patient is discharged in stable condition.     Diagnosis:    ICD-10-CM    1. Finger, superficial foreign body (splinter), initial encounter  S60.459A        Discharge Medications:  New Prescriptions    CEPHALEXIN (KEFLEX) 500 MG CAPSULE    Take 1 capsule (500 mg) by mouth 3 times daily for 7 days       Scribe Disclosure:  Mckayla SANCHEZ, am serving as a scribe at 5:23 PM on 1/7/2021 to document services personally performed by Yajaira Magaña MD based on my observations and the provider's statements to me.        Yajaira Magaña MD  01/07/21 5547

## 2021-01-07 NOTE — ED AVS SNAPSHOT
[facility] Emergency Dept  [address]  [facility] [address]  Phone: [phone]  Fax: [phone]                                    Laure Mendez   MRN: [id number]    Department: [facility] Emergency Dept   Date of Visit: 1/7/2021           After Visit Summary Signature Page    I have received my discharge instructions, and my questions have been answered. I have discussed any challenges I see with this plan with the nurse or doctor.    ..........................................................................................................................................  Patient/Patient Representative Signature      ..........................................................................................................................................  Patient Representative Print Name and Relationship to Patient    ..................................................               ................................................  Date                                   Time    ..........................................................................................................................................  Reviewed by Signature/Title    ...................................................              ..............................................  Date                                               Time          22EPIC Rev 08/18

## 2021-01-07 NOTE — PROGRESS NOTES
Triage note  Patient presented to urgent care today with a complaint of a sliver stuck in the distal volar aspect of the right middle finger. The wooden foreign sliver entered through a side of the finger then exited through another end. Given the depth of the sliver in the skin recommended evaluation in the ED. Patient will need a hand surgeon consult. She agrees.     Sophie Boone PA-C

## 2021-01-07 NOTE — ED TRIAGE NOTES
Pt cleaning cabinet and got a large splinter of wood in right middle finger.  Unknown tetanus status

## 2021-01-07 NOTE — LETTER
January 7, 2021      To Whom It May Concern:      Laure Mendez was seen in our Emergency Department today, 01/07/21.  I expect her condition to improve over the next 2 days.  She may return to work/school when improved.    Sincerely,        Yajaira Magaña MD

## 2021-01-11 ENCOUNTER — HOSPITAL ENCOUNTER (OUTPATIENT)
Dept: MAMMOGRAPHY | Facility: CLINIC | Age: 46
Discharge: HOME | End: 2021-01-11
Attending: PHYSICIAN ASSISTANT | Admitting: PHYSICIAN ASSISTANT
Payer: COMMERCIAL

## 2021-01-11 DIAGNOSIS — Z12.31 ENCOUNTER FOR SCREENING MAMMOGRAM FOR BREAST CANCER: ICD-10-CM

## 2021-01-11 PROCEDURE — 77063 BREAST TOMOSYNTHESIS BI: CPT

## 2021-01-15 NOTE — RESULT ENCOUNTER NOTE
Dear Zohreh,      Your recent test results are noted below:    -Mammogram was normal.  ADVISE: rechecking in 1 year.    For additional lab test information, [email or web address] is an excellent reference. Please contact the clinic at [phone] with any further questions or concerns.    Sincerely,      Fide Vera PA-C  [facility]

## 2021-02-08 ENCOUNTER — VIRTUAL VISIT (OUTPATIENT)
Dept: FAMILY MEDICINE | Facility: CLINIC | Age: 46
End: 2021-02-08
Payer: COMMERCIAL

## 2021-02-08 DIAGNOSIS — F32.5 MAJOR DEPRESSION IN COMPLETE REMISSION (H): Primary | ICD-10-CM

## 2021-02-08 DIAGNOSIS — F41.9 ANXIETY: ICD-10-CM

## 2021-02-08 DIAGNOSIS — R63.5 WEIGHT GAIN: ICD-10-CM

## 2021-02-08 DIAGNOSIS — J30.89 CHRONIC NON-SEASONAL ALLERGIC RHINITIS: ICD-10-CM

## 2021-02-08 PROCEDURE — 99214 OFFICE O/P EST MOD 30 MIN: CPT | Mod: 95 | Performed by: PHYSICIAN ASSISTANT

## 2021-02-08 RX ORDER — FLUTICASONE PROPIONATE 50 MCG
SPRAY, SUSPENSION (ML) NASAL
Qty: 48 G | Refills: 3 | Status: SHIPPED | OUTPATIENT
Start: 2021-02-08 | End: 2022-05-13

## 2021-02-08 RX ORDER — BUPROPION HYDROCHLORIDE 150 MG/1
150 TABLET ORAL EVERY MORNING
Qty: 90 TABLET | Refills: 1 | Status: SHIPPED | OUTPATIENT
Start: 2021-02-08 | End: 2021-10-07

## 2021-02-08 ASSESSMENT — ANXIETY QUESTIONNAIRES
3. WORRYING TOO MUCH ABOUT DIFFERENT THINGS: NOT AT ALL
6. BECOMING EASILY ANNOYED OR IRRITABLE: SEVERAL DAYS
7. FEELING AFRAID AS IF SOMETHING AWFUL MIGHT HAPPEN: NOT AT ALL
1. FEELING NERVOUS, ANXIOUS, OR ON EDGE: NOT AT ALL
2. NOT BEING ABLE TO STOP OR CONTROL WORRYING: NOT AT ALL
IF YOU CHECKED OFF ANY PROBLEMS ON THIS QUESTIONNAIRE, HOW DIFFICULT HAVE THESE PROBLEMS MADE IT FOR YOU TO DO YOUR WORK, TAKE CARE OF THINGS AT HOME, OR GET ALONG WITH OTHER PEOPLE: NOT DIFFICULT AT ALL

## 2021-02-08 ASSESSMENT — PATIENT HEALTH QUESTIONNAIRE - PHQ9
5. POOR APPETITE OR OVEREATING: NOT AT ALL
SUM OF ALL RESPONSES TO PHQ QUESTIONS 1-9: 1

## 2021-02-08 NOTE — PROGRESS NOTES
"Zohreh is a 45 year old who is being evaluated via a billable video visit.      How would you like to obtain your AVS? MyChart  If the video visit is dropped, the invitation should be resent by: Text to cell phone: [phone]  Will anyone else be joining your video visit? No      Video Start Time: Start: 02/08/2021 03:20 pm  Stop: 02/08/2021 03:37 pm  Assessment & Plan     Major depression in complete remission (H)  Anxiety  Stable and doing well on current regimen. Continue without changes. Recheck 6 months at routine health physical.  - buPROPion (WELLBUTRIN XL) 150 MG 24 hr tablet; Take 1 tablet (150 mg) by mouth every morning  - sertraline (ZOLOFT) 50 MG tablet; Take 1 tablet (50 mg) by mouth daily    Chronic non-seasonal allergic rhinitis, unspecified trigger  Takes year round. Risks reviewed, encouraged annual eye exam which she already has scheduled for next week.  - fluticasone (FLONASE) 50 MCG/ACT nasal spray; USE TWO SPRAYS IN EACH NOSTRIL EVERY DAY    Weight gain  Likely secondary to decreased exercise after CTS surgeries as couldn't walk her dog. Less likely related to mental health meds. Pt making more of a dedicated effort to increase healthy habits and encouraged in doing so. Can consider repeating TSH at physical if worsening weight gain despite making positive changes. Mentions she also is taking an iron supplement for platelet donation and has always been borderline low hgb. Offered to check CBC and iron studies when in next as well.       BMI:   Estimated body mass index is 30.95 kg/m  as calculated from the following:    Height as of 1/7/21: 1.651 m (5' 5\").    Weight as of 1/7/21: 84.4 kg (186 lb).   Weight management plan: Discussed healthy diet and exercise guidelines          Return in about 6 months (around 8/8/2021) for Preventive Visit, mood recheck.    DARIN Velasco [facility]     Zohreh is a 45 year old who presents to clinic today for " "the following health issues    HPI   Depression and Anxiety Follow-Up  Last we met had her other CTS completed and this went well.   Continues to feel that current med regimen is working well for her.   Doing well work wise  Has noticed some more difficulty with weight. Estimates about 10 lbs weight gain over past 8-10 months or so. Admits could be due to COVID19 pandemic. With her surgeries she wasn't able to do her dog walking since they pull quite a bit.  Has been snacking more and in past couple of weeks has.  When restaurants opened up with pandemic ate out more too.  Mentions she started to do platelet donation so started iron supplementation as reports at her baseline has low-normal. This has allowed her to donate no problem.      Has noticed some constipation so cut it back to twice per week then daily 3 days prior to donation.   Fhx: aunt with crohn's, but pt has no hx of weight loss, blood in stool or diarrhea.  Mentions she did have a lot of gluten while on vacation and \"felt awful for 5 days after that\" and since cutting back gluten in her diet she feels better.       How are you doing with your depression since your last visit? Improved     How are you doing with your anxiety since your last visit?  Improved     Are you having other symptoms that might be associated with depression or anxiety? No    Have you had a significant life event? No     Do you have any concerns with your use of alcohol or other drugs? No     2) Request refill of flonase. Takes year round for chronic rhinitis.   appt Thursday.     Social History     Tobacco Use     Smoking status: Never Smoker     Smokeless tobacco: Never Used   Substance Use Topics     Alcohol use: Yes     Comment: every day 1-2 drinks      Drug use: No     PHQ 9/9/2019 7/30/2020 2/8/2021   PHQ-9 Total Score 3 3 1   Q9: Thoughts of better off dead/self-harm past 2 weeks Not at all Not at all Not at all     CRISTINA-7 SCORE 7/24/2019 9/9/2019 7/30/2020   Total Score - " - -   Total Score - - 3 (minimal anxiety)   Total Score 10 1 3     Last PHQ-9 2/8/2021   1.  Little interest or pleasure in doing things 0   2.  Feeling down, depressed, or hopeless 0   3.  Trouble falling or staying asleep, or sleeping too much 0   4.  Feeling tired or having little energy 1   5.  Poor appetite or overeating 0   6.  Feeling bad about yourself 0   7.  Trouble concentrating 0   8.  Moving slowly or restless 0   Q9: Thoughts of better off dead/self-harm past 2 weeks 0   PHQ-9 Total Score 1   Difficulty at work, home, or with people Not difficult at all     CRISTINA-7  2/8/2021   1. Feeling nervous, anxious, or on edge 0   2. Not being able to stop or control worrying 0   3. Worrying too much about different things 0   4. Trouble relaxing 0   5. Being so restless that it is hard to sit still -   6. Becoming easily annoyed or irritable 1   7. Feeling afraid, as if something awful might happen 0   CRISTINA-7 Total Score -   If you checked any problems, how difficult have they made it for you to do your work, take care of things at home, or get along with other people? Not difficult at all       Suicide Assessment Five-step Evaluation and Treatment (SAFE-T)      Review of Systems   Constitutional, HEENT, cardiovascular, pulmonary, gi and gu systems are negative, except as otherwise noted.      Objective           Vitals:  No vitals were obtained today due to virtual visit.    Physical Exam   GENERAL: Healthy, alert and no distress  EYES: Eyes grossly normal to inspection.  No discharge or erythema, or obvious scleral/conjunctival abnormalities.  RESP: No audible wheeze, cough, or visible cyanosis.  No visible retractions or increased work of breathing.    SKIN: Visible skin clear. No significant rash, abnormal pigmentation or lesions.  NEURO: Cranial nerves grossly intact.  Mentation and speech appropriate for age.  PSYCH: Mentation appears normal, affect normal/bright, judgement and insight intact, normal speech  and appearance well-groomed.                Video-Visit Details    Type of service:  Video Visit    Video End Time:Start: 02/08/2021 03:20 pm  Stop: 02/08/2021 03:37 pm    Originating Location (pt. Location): Other Pt's car    Distant Location (provider location):  [facility]     Platform used for Video Visit: [employer]

## 2021-05-31 ENCOUNTER — RECORDS - HEALTHEAST (OUTPATIENT)
Dept: ADMINISTRATIVE | Facility: CLINIC | Age: 46
End: 2021-05-31

## 2021-09-16 ENCOUNTER — TRANSFERRED RECORDS (OUTPATIENT)
Dept: HEALTH INFORMATION MANAGEMENT | Facility: CLINIC | Age: 46
End: 2021-09-16

## 2021-09-19 ENCOUNTER — HEALTH MAINTENANCE LETTER (OUTPATIENT)
Age: 46
End: 2021-09-19

## 2021-10-07 ENCOUNTER — ANCILLARY PROCEDURE (OUTPATIENT)
Dept: GENERAL RADIOLOGY | Facility: CLINIC | Age: 46
End: 2021-10-07
Attending: PHYSICIAN ASSISTANT
Payer: COMMERCIAL

## 2021-10-07 ENCOUNTER — OFFICE VISIT (OUTPATIENT)
Dept: FAMILY MEDICINE | Facility: CLINIC | Age: 46
End: 2021-10-07
Payer: COMMERCIAL

## 2021-10-07 VITALS
BODY MASS INDEX: 28.79 KG/M2 | TEMPERATURE: 96.4 F | OXYGEN SATURATION: 99 % | HEIGHT: 65 IN | WEIGHT: 172.8 LBS | RESPIRATION RATE: 16 BRPM | SYSTOLIC BLOOD PRESSURE: 118 MMHG | DIASTOLIC BLOOD PRESSURE: 72 MMHG | HEART RATE: 75 BPM

## 2021-10-07 DIAGNOSIS — F41.9 ANXIETY: ICD-10-CM

## 2021-10-07 DIAGNOSIS — B39.9 HISTOPLASMOSIS RETINITIS: ICD-10-CM

## 2021-10-07 DIAGNOSIS — Z11.3 SCREEN FOR STD (SEXUALLY TRANSMITTED DISEASE): ICD-10-CM

## 2021-10-07 DIAGNOSIS — Z11.59 NEED FOR HEPATITIS C SCREENING TEST: ICD-10-CM

## 2021-10-07 DIAGNOSIS — H32 HISTOPLASMOSIS RETINITIS: ICD-10-CM

## 2021-10-07 DIAGNOSIS — Z63.5 DIVORCE: ICD-10-CM

## 2021-10-07 DIAGNOSIS — N94.6 DYSMENORRHEA: ICD-10-CM

## 2021-10-07 DIAGNOSIS — Z00.00 ROUTINE GENERAL MEDICAL EXAMINATION AT A HEALTH CARE FACILITY: Primary | ICD-10-CM

## 2021-10-07 DIAGNOSIS — Z63.4 DEATH OF FAMILY MEMBER: ICD-10-CM

## 2021-10-07 DIAGNOSIS — Z13.220 LIPID SCREENING: ICD-10-CM

## 2021-10-07 DIAGNOSIS — F32.5 MAJOR DEPRESSION IN COMPLETE REMISSION (H): ICD-10-CM

## 2021-10-07 DIAGNOSIS — Z13.1 SCREENING FOR DIABETES MELLITUS: ICD-10-CM

## 2021-10-07 DIAGNOSIS — Z12.31 ENCOUNTER FOR SCREENING MAMMOGRAM FOR BREAST CANCER: ICD-10-CM

## 2021-10-07 PROCEDURE — 71046 X-RAY EXAM CHEST 2 VIEWS: CPT | Mod: FY | Performed by: RADIOLOGY

## 2021-10-07 PROCEDURE — 99214 OFFICE O/P EST MOD 30 MIN: CPT | Mod: 25 | Performed by: PHYSICIAN ASSISTANT

## 2021-10-07 PROCEDURE — 99396 PREV VISIT EST AGE 40-64: CPT | Performed by: PHYSICIAN ASSISTANT

## 2021-10-07 RX ORDER — NORETHINDRONE ACETATE AND ETHINYL ESTRADIOL .02; 1 MG/1; MG/1
TABLET ORAL
Qty: 84 TABLET | Refills: 3 | Status: SHIPPED | OUTPATIENT
Start: 2021-10-07 | End: 2023-01-04

## 2021-10-07 RX ORDER — FERROUS SULFATE 325(65) MG
325 TABLET ORAL
COMMUNITY

## 2021-10-07 RX ORDER — BUPROPION HYDROCHLORIDE 150 MG/1
150 TABLET ORAL EVERY MORNING
Qty: 90 TABLET | Refills: 1 | Status: SHIPPED | OUTPATIENT
Start: 2021-10-07 | End: 2022-04-29

## 2021-10-07 SDOH — SOCIAL STABILITY - SOCIAL INSECURITY: DISSAPEARANCE AND DEATH OF FAMILY MEMBER: Z63.4

## 2021-10-07 SDOH — SOCIAL STABILITY - SOCIAL INSECURITY: DISRUPTION OF FAMILY BY SEPARATION AND DIVORCE: Z63.5

## 2021-10-07 ASSESSMENT — ENCOUNTER SYMPTOMS
HEADACHES: 0
BREAST MASS: 0
HEMATOCHEZIA: 0
ABDOMINAL PAIN: 0
CONSTIPATION: 0
WEAKNESS: 0
HEMATURIA: 0
PALPITATIONS: 0
DYSURIA: 0
EYE PAIN: 0
FREQUENCY: 0
DIZZINESS: 0
SHORTNESS OF BREATH: 0
ARTHRALGIAS: 0
SORE THROAT: 0
NAUSEA: 0
CHILLS: 0
PARESTHESIAS: 0
JOINT SWELLING: 0
NERVOUS/ANXIOUS: 0
FEVER: 0
DIARRHEA: 1
HEARTBURN: 0
MYALGIAS: 0
COUGH: 0

## 2021-10-07 ASSESSMENT — MIFFLIN-ST. JEOR: SCORE: 1429.7

## 2021-10-07 NOTE — PROGRESS NOTES
SUBJECTIVE:   CC: Laure Mendez is an 46 year old woman who presents for preventive health visit.       Patient has been advised of split billing requirements and indicates understanding: Yes  Healthy Habits:     Getting at least 3 servings of Calcium per day:  Yes    Bi-annual eye exam:  Yes    Dental care twice a year:  Yes    Sleep apnea or symptoms of sleep apnea:  None    Diet:  Regular (no restrictions)    Frequency of exercise:  1 day/week    Duration of exercise:  15-30 minutes    Taking medications regularly:  Yes    Medication side effects:  None    PHQ-2 Total Score: 2    Additional concerns today:  Yes    Significant interval stressors:  Found out  was cheating on her and now going through process of divorce. This has been tough and just wants it to be over. Does have good support from family and friends. Enrolled in therapy through [employer] - every other week. Otherwise feels meds prior to all of this had been working. Continues on sertraline 50mg and wellbutrin 150mg daily.    Had PRK 10 yrs ago so finally went in to have her routine eye exam and underwent retinal scan. 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 Brings copy of scan - will send to abstraction. Had already started to notice this with turning head and loosing spot of her vision, but now understands why. Was told that no treatment required at this time unless she winds up having acute vision loss symptoms. Grew up along [facility] and feels this is likely where she may have become infected as this is a risk factor for developing it. Denies any cough, SOB or respiratory symptoms. When younger did suffer with intermittent bronchitis type of illnesses and wonders if it was actually secondary to histo. Wonders about getting a baseline CXR in case something does come up so they'd we'd have a comparison to look back up. Reviewed  with one of her infectious disease colleagues who said treatment isn't required if not symptomatic.  Non-smoker, no hx of asthma.  Continues to work as surgical PA-C and this is going well    Sochx: also father passed away this year. Reports fell suffered head trauma resulting in subarachnoid bleed and was essentially unresponsive. Hard decision to let him go, but had always been very self sufficient and would have never wanted to be dependent/unable to maintain his previous quality of life.     Renew OCPs - very stable. Went on continuous dosing to help with menorrhagia   Donates platelets/plasm and takes iron 2x per week to prevent anemia.    The 10-year ASCVD risk score (Leanne PHAN Jr., et al., 2013) is: 0.6%    Values used to calculate the score:      Age: 46 years      Sex: Female      Is Non- : No      Diabetic: No      Tobacco smoker: No      Systolic Blood Pressure: 118 mmHg      Is BP treated: No      HDL Cholesterol: 71 mg/dL      Total Cholesterol: 239 mg/dL      Today's PHQ-2 Score:   PHQ-2 ( 1999 Pfizer) 10/7/2021   Q1: Little interest or pleasure in doing things 1   Q2: Feeling down, depressed or hopeless 1   PHQ-2 Score 2   Q1: Little interest or pleasure in doing things Several days   Q2: Feeling down, depressed or hopeless Several days   PHQ-2 Score 2       Abuse: Current or Past (Physical, Sexual or Emotional) - No  Do you feel safe in your environment? Yes    Have you ever done Advance Care Planning? (For example, a Health Directive, POLST, or a discussion with a medical provider or your loved ones about your wishes): No, advance care planning information given to patient to review.  Patient plans to discuss their wishes with loved ones or provider.      Social History     Tobacco Use     Smoking status: Never Smoker     Smokeless tobacco: Never Used   Substance Use Topics     Alcohol use: Yes     Comment: every day 1-2 drinks      If you drink alcohol do you typically have >3  drinks per day or >7 drinks per week? No    Alcohol Use 10/7/2021   Prescreen: >3 drinks/day or >7 drinks/week? Yes   Prescreen: >3 drinks/day or >7 drinks/week? -   AUDIT SCORE  11     AUDIT - Alcohol Use Disorders Identification Test - Reproduced from the World Health Organization Audit 2001 (Second Edition) 10/7/2021   1.  How often do you have a drink containing alcohol? 4 or more times a week   2.  How many drinks containing alcohol do you have on a typical day when you are drinking? 3 or 4   3.  How often do you have five or more drinks on one occasion? Weekly   4.  How often during the last year have you found that you were not able to stop drinking once you had started? Less than monthly   5.  How often during the last year have you failed to do what was normally expected of you because of drinking? Never   6.  How often during the last year have you needed a first drink in the morning to get yourself going after a heavy drinking session? Never   7.  How often during the last year have you had a feeling of guilt or remorse after drinking? Less than monthly   8.  How often during the last year have you been unable to remember what happened the night before because of your drinking? Less than monthly   9.  Have you or someone else been injured because of your drinking? No   10. Has a relative, friend, doctor or other health care worker been concerned about your drinking or suggested you cut down? No   TOTAL SCORE 11       Reviewed orders with patient.  Reviewed health maintenance and updated orders accordingly - Yes  BP Readings from Last 3 Encounters:   10/07/21 118/72   01/07/21 (!) 136/91   01/07/21 130/88    Wt Readings from Last 3 Encounters:   10/07/21 78.4 kg (172 lb 12.8 oz)   01/07/21 84.4 kg (186 lb)   01/07/21 84.4 kg (186 lb 1.6 oz)                  Patient Active Problem List   Diagnosis     CARDIOVASCULAR SCREENING; LDL GOAL LESS THAN 160     Anxiety     Elevated LDL cholesterol level     Chronic  non-seasonal allergic rhinitis, unspecified trigger     Overweight (BMI 25.0-29.9)     Major depression in complete remission (H)     Bilateral carpal tunnel syndrome - s/p release on R. now pursuing L     Histoplasmosis retinitis - progressive scarring of retina may lead to future vision loss. Grew-up along [facility] in [place], [place] - risk factor fo histoplasmosis infection     Past Surgical History:   Procedure Laterality Date     MOUTH SURGERY       SOFT TISSUE SURGERY       ZZC NONSPECIFIC PROCEDURE      wisdom teeth     Z NONSPECIFIC PROCEDURE      cervical Lymph node bx-related to tonsils     Z NONSPECIFIC PROCEDURE      Tonsillectomy     Z NONSPECIFIC PROCEDURE  2000    colposcopy s/p ASCUS       Social History     Tobacco Use     Smoking status: Never Smoker     Smokeless tobacco: Never Used   Substance Use Topics     Alcohol use: Yes     Comment: every day 1-2 drinks      Family History   Problem Relation Age of Onset     Lipids Mother      Thyroid Disease Mother         lobectomy for thyroid nodule     Hypertension Father      Asthma Father      Allergies Father         Asthma     Coronary Artery Disease Father         stent     Pacemaker Father      Allergies Sister         excercise induced asthma     Cardiovascular Paternal Grandfather         PE     Coronary Artery Disease Maternal Grandfather         multiple MI     Genitourinary Problems Sister         multi ASCUS paps     Breast Cancer Paternal Aunt 80     Breast Cancer Paternal Aunt 80     Diabetes No family hx of      Colon Cancer No family hx of          Current Outpatient Medications   Medication Sig Dispense Refill     buPROPion (WELLBUTRIN XL) 150 MG 24 hr tablet Take 1 tablet (150 mg) by mouth every morning 90 tablet 1     ferrous sulfate (FEROSUL) 325 (65 Fe) MG tablet Take 325 mg by mouth daily (with breakfast) Twice weekly       fluticasone (FLONASE) 50 MCG/ACT nasal spray USE TWO SPRAYS IN EACH NOSTRIL EVERY DAY 48 g 3      loratadine (CLARITIN) 10 MG tablet Take 10 mg by mouth daily       multivitamin (ONE-DAILY) tablet Take 1 tablet by mouth daily       norethindrone-ethinyl estradiol (MICROGESTIN 1/20) 1-20 MG-MCG tablet TAKE ONE TABLET BY MOUTH DAILY CONTINUOUSLY 84 tablet 3     sertraline (ZOLOFT) 50 MG tablet Take 1 tablet (50 mg) by mouth daily 90 tablet 1     Vitamin D, Cholecalciferol, 1000 units TABS        Allergies   Allergen Reactions     Bactrim [Sulfamethoxazole W-Trimethoprim]        Breast Cancer Screening:    FHS-7:   Breast CA Risk Assessment (FHS-7) 10/7/2021   Did any of your first-degree relatives have breast or ovarian cancer? No   Did any of your relatives have bilateral breast cancer? No   Did any man in your family have breast cancer? No   Did any woman in your family have breast and ovarian cancer? No   Did any woman in your family have breast cancer before age 50 y? No   Do you have 2 or more relatives with breast and/or ovarian cancer? No   Do you have 2 or more relatives with breast and/or bowel cancer? No     Mammogram Screening: Recommended annual mammography  Pertinent mammograms are reviewed under the imaging tab.    History of abnormal Pap smear: NO - age 30-65 PAP every 5 years with negative HPV co-testing recommended  PAP / HPV Latest Ref Rng & Units 7/24/2019 4/20/2016 7/18/2013   PAP (Historical) - NIL NIL NIL   HPV16 NEG:Negative Negative Negative -   HPV18 NEG:Negative Negative Negative -   HRHPV NEG:Negative Negative Negative -     Reviewed and updated as needed this visit by clinical staff  Tobacco  Allergies  Meds  Problems  Med Hx  Surg Hx  Fam Hx  Soc Hx          Reviewed and updated as needed this visit by Provider  Tobacco  Allergies  Meds  Problems  Med Hx  Surg Hx  Fam Hx  Soc Hx             Review of Systems   Constitutional: Negative for chills and fever.   HENT: Negative for congestion, ear pain, hearing loss and sore throat.    Eyes: Negative for pain and visual  "disturbance.   Respiratory: Negative for cough and shortness of breath.    Cardiovascular: Negative for chest pain, palpitations and peripheral edema.   Gastrointestinal: Positive for diarrhea. Negative for abdominal pain, constipation, heartburn, hematochezia and nausea.   Breasts:  Negative for tenderness, breast mass and discharge.   Genitourinary: Positive for urgency. Negative for dysuria, frequency, genital sores, hematuria, pelvic pain, vaginal bleeding and vaginal discharge.   Musculoskeletal: Negative for arthralgias, joint swelling and myalgias.   Skin: Negative for rash.   Neurological: Negative for dizziness, weakness, headaches and paresthesias.   Psychiatric/Behavioral: Negative for mood changes. The patient is not nervous/anxious.         OBJECTIVE:   /72   Pulse 75   Temp (!) 96.4  F (35.8  C) (Tympanic)   Resp 16   Ht 1.651 m (5' 5\")   Wt 78.4 kg (172 lb 12.8 oz)   LMP  (LMP Unknown)   SpO2 99%   BMI 28.76 kg/m    Physical Exam  GENERAL: healthy, alert and no distress  EYES: Eyes grossly normal to inspection, PERRL and conjunctivae and sclerae normal  HENT: ear canals and TM's normal, nose and mouth without ulcers or lesions  NECK: no adenopathy, no asymmetry, masses, or scars and thyroid normal to palpation  RESP: lungs clear to auscultation - no rales, rhonchi or wheezes  BREAST: deferred  CV: regular rate and rhythm, normal S1 S2, no S3 or S4, no murmur, click or rub, no peripheral edema and peripheral pulses strong  ABDOMEN: soft, nontender, no hepatosplenomegaly, no masses and bowel sounds normal  MS: no gross musculoskeletal defects noted, no edema  SKIN: no suspicious lesions or rashes  NEURO: Normal strength and tone, mentation intact and speech normal  PSYCH: mentation appears normal, affect normal/bright    Diagnostic Test Results:  Labs reviewed in Epic    ASSESSMENT/PLAN:   Laure was seen today for physical.    Diagnoses and all orders for this visit:    Routine general " medical examination at a health care facility  -     Reviewed preventative health recommendations for age.  REVIEW OF HEALTH MAINTENANCE PROTOCOL ORDERS    Anxiety  Major depression in complete remission (H)  Divorce  Death of family member  -     Stable on current regimen noted below and will continue without changes. Multiple interval stressors contributing to mood as noted per HPI but fortunately has been getting good support and going through therapy which she will plan to continue. Encouraged her in this and advised to follow-up if regression or if she has any concerns at any time. Otherwise, recheck in 6 months virtual visit.   PROPion (WELLBUTRIN XL) 150 MG 24 hr tablet; Take 1 tablet (150 mg) by mouth every morning  -     sertraline (ZOLOFT) 50 MG tablet; Take 1 tablet (50 mg) by mouth daily    Dysmenorrhea  -     Very stable on OCPs. Renewed for 1 year.  norethindrone-ethinyl estradiol (MICROGESTIN 1/20) 1-20 MG-MCG tablet; TAKE ONE TABLET BY MOUTH DAILY CONTINUOUSLY    Screen for STD (sexually transmitted disease)  -     Discussed comprehensive STD screening in light of recently finding out spouse was cheating on her and pt would like to pursue this. Notify of results when available.  - HIV Antigen Antibody Combo; Future  -     Hepatitis B surface antigen; Future  -     Hepatitis B Surface Antibody; Future  -     Treponema Abs w Reflex to RPR and Titer; Future  -     Chlamydia trachomatis PCR; Future  -     Neisseria gonorrhoeae PCR; Future    Need for hepatitis C screening test  -     Hepatitis C Screen Reflex to HCV RNA Quant and Genotype; Future    Histoplasmosis retinitis - progressive scarring of retina may lead to future vision loss. Grew-up along [facility] in [place], [place] - risk factor fo histoplasmosis infection  -     Recently found out after completing retinal scan with ophthalmologist that she has histoplasmosis retinitis which will result in scarring and progressive vision loss as  "new spots may develop over time. Reports was told that no treatment was required for this unless had active flare-up. She will continue to see ophthalmology for routine surveillance in the interim. She wonders if it would be prudent to get a baseline CXR as she suffered with intermittent bronchitis episodes during childhood and in hindsight wonders if it was due to histoplasmosis. No current respiratory symptoms or cough and reports she reviewed with her infectious disease colleague who reports no treatment is needed if asymptomatic. She will check in with them again as has some follow-up questions and will let me know if she'd ever like to see pulmonology.  XR Chest 2 Views; Future    Lipid screening  -     Lipid panel reflex to direct LDL Fasting; Future    Screening for diabetes mellitus  -     Comprehensive metabolic panel (BMP + Alb, Alk Phos, ALT, AST, Total. Bili, TP); Future    Encounter for screening mammogram for breast cancer  -     MA Screen Bilateral w/Jose; Future      Patient has been advised of split billing requirements and indicates understanding: No  COUNSELING:  Reviewed preventive health counseling, as reflected in patient instructions       Regular exercise       Healthy diet/nutrition       Immunizations    Declined: Influenza due to Other - planning to get through work               Contraception       Safe sex practices/STD prevention       Consider Hep C screening for all patients one time for ages 18-79 years       HIV screeninx in teen years, 1x in adult years, and at intervals if high risk    Estimated body mass index is 28.76 kg/m  as calculated from the following:    Height as of this encounter: 1.651 m (5' 5\").    Weight as of this encounter: 78.4 kg (172 lb 12.8 oz).    Weight management plan: Discussed healthy diet and exercise guidelines    She reports that she has never smoked. She has never used smokeless tobacco.      Counseling Resources:  ATP IV Guidelines  Pooled Cohorts " Equation Calculator  Breast Cancer Risk Calculator  BRCA-Related Cancer Risk Assessment: FHS-7 Tool  FRAX Risk Assessment  ICSI Preventive Guidelines  Dietary Guidelines for Americans, 2010  USDA's MyPlate  ASA Prophylaxis  Lung CA Screening    DARIN Velasco [facility]

## 2021-10-08 ASSESSMENT — ANXIETY QUESTIONNAIRES
1. FEELING NERVOUS, ANXIOUS, OR ON EDGE: SEVERAL DAYS
IF YOU CHECKED OFF ANY PROBLEMS ON THIS QUESTIONNAIRE, HOW DIFFICULT HAVE THESE PROBLEMS MADE IT FOR YOU TO DO YOUR WORK, TAKE CARE OF THINGS AT HOME, OR GET ALONG WITH OTHER PEOPLE: NOT DIFFICULT AT ALL
7. FEELING AFRAID AS IF SOMETHING AWFUL MIGHT HAPPEN: NOT AT ALL
3. WORRYING TOO MUCH ABOUT DIFFERENT THINGS: SEVERAL DAYS
6. BECOMING EASILY ANNOYED OR IRRITABLE: SEVERAL DAYS
2. NOT BEING ABLE TO STOP OR CONTROL WORRYING: SEVERAL DAYS
GAD7 TOTAL SCORE: 4
5. BEING SO RESTLESS THAT IT IS HARD TO SIT STILL: NOT AT ALL

## 2021-10-08 ASSESSMENT — PATIENT HEALTH QUESTIONNAIRE - PHQ9
SUM OF ALL RESPONSES TO PHQ QUESTIONS 1-9: 4
5. POOR APPETITE OR OVEREATING: NOT AT ALL

## 2021-10-09 ASSESSMENT — ANXIETY QUESTIONNAIRES: GAD7 TOTAL SCORE: 4

## 2021-10-18 NOTE — RESULT ENCOUNTER NOTE
Dear Zohreh,    My apologies on the delay of your chest x-ray, but all was normal :) So at least we've got a great baseline! Nice to see you the other day, hang in there with everything!    For additional lab test information, [email or web address] is an excellent reference. Please contact the clinic at [phone] with any further questions or concerns.    Sincerely,      Fide Vera PA-C  [facility]

## 2021-10-19 PROBLEM — F32.9 MAJOR DEPRESSION: Status: ACTIVE | Noted: 2019-09-09

## 2022-01-27 ENCOUNTER — HOSPITAL ENCOUNTER (OUTPATIENT)
Dept: MAMMOGRAPHY | Facility: CLINIC | Age: 47
Discharge: HOME | End: 2022-01-27
Attending: PHYSICIAN ASSISTANT | Admitting: PHYSICIAN ASSISTANT
Payer: COMMERCIAL

## 2022-01-27 DIAGNOSIS — Z12.31 ENCOUNTER FOR SCREENING MAMMOGRAM FOR BREAST CANCER: ICD-10-CM

## 2022-01-27 PROCEDURE — 77067 SCR MAMMO BI INCL CAD: CPT

## 2022-01-31 NOTE — RESULT ENCOUNTER NOTE
Dear Zohreh,      Your recent test results are noted below:    -Mammogram was normal.  ADVISE: rechecking in 1 year.    For additional lab test information, [email or web address] is an excellent reference. Please contact the clinic at [phone] with any further questions or concerns.    Sincerely,      Fide Vera PA-C  [facility]

## 2022-02-03 ENCOUNTER — LAB (OUTPATIENT)
Dept: LAB | Facility: CLINIC | Age: 47
End: 2022-02-03
Attending: FAMILY MEDICINE
Payer: COMMERCIAL

## 2022-02-03 DIAGNOSIS — Z20.822 ENCOUNTER FOR LABORATORY TESTING FOR COVID-19 VIRUS: ICD-10-CM

## 2022-02-03 PROCEDURE — U0003 INFECTIOUS AGENT DETECTION BY NUCLEIC ACID (DNA OR RNA); SEVERE ACUTE RESPIRATORY SYNDROME CORONAVIRUS 2 (SARS-COV-2) (CORONAVIRUS DISEASE [COVID-19]), AMPLIFIED PROBE TECHNIQUE, MAKING USE OF HIGH THROUGHPUT TECHNOLOGIES AS DESCRIBED BY CMS-2020-01-R: HCPCS

## 2022-02-03 PROCEDURE — U0005 INFEC AGEN DETEC AMPLI PROBE: HCPCS

## 2022-02-04 ENCOUNTER — LAB (OUTPATIENT)
Dept: LAB | Facility: CLINIC | Age: 47
End: 2022-02-04
Payer: COMMERCIAL

## 2022-02-04 DIAGNOSIS — Z13.1 SCREENING FOR DIABETES MELLITUS: ICD-10-CM

## 2022-02-04 DIAGNOSIS — Z11.59 NEED FOR HEPATITIS C SCREENING TEST: ICD-10-CM

## 2022-02-04 DIAGNOSIS — Z13.220 LIPID SCREENING: ICD-10-CM

## 2022-02-04 DIAGNOSIS — Z11.3 SCREEN FOR STD (SEXUALLY TRANSMITTED DISEASE): ICD-10-CM

## 2022-02-04 LAB
ALBUMIN SERPL-MCNC: 3.5 G/DL (ref 3.4–5)
ALP SERPL-CCNC: 58 U/L (ref 40–150)
ALT SERPL W P-5'-P-CCNC: 21 U/L (ref 0–50)
ANION GAP SERPL CALCULATED.3IONS-SCNC: 6 MMOL/L (ref 3–14)
AST SERPL W P-5'-P-CCNC: 10 U/L (ref 0–45)
BILIRUB SERPL-MCNC: 0.5 MG/DL (ref 0.2–1.3)
BUN SERPL-MCNC: 14 MG/DL (ref 7–30)
CALCIUM SERPL-MCNC: 8.8 MG/DL (ref 8.5–10.1)
CHLORIDE BLD-SCNC: 108 MMOL/L (ref 94–109)
CHOLEST SERPL-MCNC: 235 MG/DL
CO2 SERPL-SCNC: 24 MMOL/L (ref 20–32)
CREAT SERPL-MCNC: 0.87 MG/DL (ref 0.52–1.04)
FASTING STATUS PATIENT QL REPORTED: YES
GFR SERPL CREATININE-BSD FRML MDRD: 83 ML/MIN/1.73M2
GLUCOSE BLD-MCNC: 92 MG/DL (ref 70–99)
HBV SURFACE AB SERPL IA-ACNC: 507.64 M[IU]/ML
HBV SURFACE AG SERPL QL IA: NONREACTIVE
HCV AB SERPL QL IA: NONREACTIVE
HDLC SERPL-MCNC: 84 MG/DL
HIV 1+2 AB+HIV1 P24 AG SERPL QL IA: NONREACTIVE
LDLC SERPL CALC-MCNC: 139 MG/DL
NONHDLC SERPL-MCNC: 151 MG/DL
POTASSIUM BLD-SCNC: 4.2 MMOL/L (ref 3.4–5.3)
PROT SERPL-MCNC: 7.6 G/DL (ref 6.8–8.8)
SARS-COV-2 RNA RESP QL NAA+PROBE: NEGATIVE
SODIUM SERPL-SCNC: 138 MMOL/L (ref 133–144)
T PALLIDUM AB SER QL: NONREACTIVE
TRIGL SERPL-MCNC: 60 MG/DL

## 2022-02-04 PROCEDURE — 86706 HEP B SURFACE ANTIBODY: CPT

## 2022-02-04 PROCEDURE — 36415 COLL VENOUS BLD VENIPUNCTURE: CPT

## 2022-02-04 PROCEDURE — 86780 TREPONEMA PALLIDUM: CPT

## 2022-02-04 PROCEDURE — 87591 N.GONORRHOEAE DNA AMP PROB: CPT

## 2022-02-04 PROCEDURE — 86803 HEPATITIS C AB TEST: CPT

## 2022-02-04 PROCEDURE — 80061 LIPID PANEL: CPT

## 2022-02-04 PROCEDURE — 87389 HIV-1 AG W/HIV-1&-2 AB AG IA: CPT

## 2022-02-04 PROCEDURE — 87491 CHLMYD TRACH DNA AMP PROBE: CPT

## 2022-02-04 PROCEDURE — 87340 HEPATITIS B SURFACE AG IA: CPT

## 2022-02-04 PROCEDURE — 80053 COMPREHEN METABOLIC PANEL: CPT

## 2022-02-04 NOTE — RESULT ENCOUNTER NOTE
Dear Zohreh,      Your recent test results are noted below:    COVID19 test was negative/normal.     For additional lab test information, [email or web address] is an excellent reference. Please contact the clinic at [phone] with any further questions or concerns.    Sincerely,      Fide Vera PA-C  [facility]

## 2022-02-05 LAB
C TRACH DNA SPEC QL NAA+PROBE: NEGATIVE
N GONORRHOEA DNA SPEC QL NAA+PROBE: NEGATIVE

## 2022-02-06 NOTE — RESULT ENCOUNTER NOTE
Dear Zohreh,      Your recent test results are noted below:    -LDL(bad) cholesterol level is elevated which can increase your heart disease risk. This is a bit better though from last year. A diet high in fat and simple carbohydrates, genetics and being overweight can contribute to this. ADVISE: exercising 150 minutes of aerobic exercise per week (30 minutes for 5 days per week or 50 minutes for 3 days per week are options) and eating a low saturated fat/low carbohydrate diet are helpful to improve this.  -Liver and gallbladder tests are normal (ALT,AST, Alk phos, bilirubin), kidney function is normal (Cr, GFR), sodium is normal, potassium is normal, calcium is normal, glucose is normal.  -All of your STD screening labs were normal.  -You are immune to hepatitis B.    For additional lab test information, [email or web address] is an excellent reference. Please contact the clinic at [phone] with any further questions or concerns.    Sincerely,      Fide Vera PA-C  [facility]

## 2022-04-27 DIAGNOSIS — F41.9 ANXIETY: ICD-10-CM

## 2022-04-27 DIAGNOSIS — F32.5 MAJOR DEPRESSION IN COMPLETE REMISSION (H): ICD-10-CM

## 2022-04-27 NOTE — LETTER
[address] [id number] [phone]       May 2, 2022    Laure Mendez  [address] [place] [address]    To Zohreh:     We have been calling you regarding a recent refill request we received for Wellbutrin.  Unfortunately, we were unable to reach you.  We are notifying you that you are due for 6 month med check prior to your next refill.  You can schedule this appointment via Neema or by calling the clinic at [phone] Option 1.      Sincerely,    Fide Vera PA-C / crc

## 2022-04-29 ENCOUNTER — MYC MEDICAL ADVICE (OUTPATIENT)
Dept: FAMILY MEDICINE | Facility: CLINIC | Age: 47
End: 2022-04-29
Payer: COMMERCIAL

## 2022-04-29 RX ORDER — BUPROPION HYDROCHLORIDE 150 MG/1
150 TABLET ORAL EVERY MORNING
Qty: 90 TABLET | Refills: 0 | Status: SHIPPED | OUTPATIENT
Start: 2022-04-29 | End: 2022-08-03

## 2022-04-29 NOTE — TELEPHONE ENCOUNTER
Medication is being filled for 1 time refill only due to:  Patient needs to be seen because due for 6 month med check and PHQ updated.    Please call patient and assist with scheduling prior to additional refills.    Vanessa WYATT - Registered Nurse  [facility]  Acute and Diagnostic Services

## 2022-05-13 ENCOUNTER — OFFICE VISIT (OUTPATIENT)
Dept: FAMILY MEDICINE | Facility: CLINIC | Age: 47
End: 2022-05-13
Payer: COMMERCIAL

## 2022-05-13 VITALS
WEIGHT: 172 LBS | TEMPERATURE: 97.5 F | OXYGEN SATURATION: 100 % | DIASTOLIC BLOOD PRESSURE: 84 MMHG | BODY MASS INDEX: 28.66 KG/M2 | HEART RATE: 86 BPM | SYSTOLIC BLOOD PRESSURE: 112 MMHG | HEIGHT: 65 IN

## 2022-05-13 DIAGNOSIS — Z12.11 SCREEN FOR COLON CANCER: ICD-10-CM

## 2022-05-13 DIAGNOSIS — F41.9 ANXIETY: Primary | ICD-10-CM

## 2022-05-13 DIAGNOSIS — F33.42 RECURRENT MAJOR DEPRESSIVE DISORDER, IN FULL REMISSION (H): ICD-10-CM

## 2022-05-13 DIAGNOSIS — J30.89 CHRONIC NON-SEASONAL ALLERGIC RHINITIS: ICD-10-CM

## 2022-05-13 PROCEDURE — 99213 OFFICE O/P EST LOW 20 MIN: CPT | Performed by: PHYSICIAN ASSISTANT

## 2022-05-13 RX ORDER — HYDROXYZINE HYDROCHLORIDE 25 MG/1
25 TABLET, FILM COATED ORAL 3 TIMES DAILY PRN
Qty: 30 TABLET | Refills: 1 | Status: SHIPPED | OUTPATIENT
Start: 2022-05-13 | End: 2023-12-06

## 2022-05-13 RX ORDER — FLUTICASONE PROPIONATE 50 MCG
SPRAY, SUSPENSION (ML) NASAL
Qty: 48 G | Refills: 3 | Status: SHIPPED | OUTPATIENT
Start: 2022-05-13 | End: 2023-12-06

## 2022-05-13 ASSESSMENT — ANXIETY QUESTIONNAIRES
GAD7 TOTAL SCORE: 2
5. BEING SO RESTLESS THAT IT IS HARD TO SIT STILL: NOT AT ALL
7. FEELING AFRAID AS IF SOMETHING AWFUL MIGHT HAPPEN: NOT AT ALL
6. BECOMING EASILY ANNOYED OR IRRITABLE: SEVERAL DAYS
GAD7 TOTAL SCORE: 2
3. WORRYING TOO MUCH ABOUT DIFFERENT THINGS: SEVERAL DAYS
4. TROUBLE RELAXING: NOT AT ALL
GAD7 TOTAL SCORE: 2
1. FEELING NERVOUS, ANXIOUS, OR ON EDGE: NOT AT ALL
8. IF YOU CHECKED OFF ANY PROBLEMS, HOW DIFFICULT HAVE THESE MADE IT FOR YOU TO DO YOUR WORK, TAKE CARE OF THINGS AT HOME, OR GET ALONG WITH OTHER PEOPLE?: NOT DIFFICULT AT ALL
2. NOT BEING ABLE TO STOP OR CONTROL WORRYING: NOT AT ALL
7. FEELING AFRAID AS IF SOMETHING AWFUL MIGHT HAPPEN: NOT AT ALL

## 2022-05-13 ASSESSMENT — PATIENT HEALTH QUESTIONNAIRE - PHQ9
SUM OF ALL RESPONSES TO PHQ QUESTIONS 1-9: 0
SUM OF ALL RESPONSES TO PHQ QUESTIONS 1-9: 0
10. IF YOU CHECKED OFF ANY PROBLEMS, HOW DIFFICULT HAVE THESE PROBLEMS MADE IT FOR YOU TO DO YOUR WORK, TAKE CARE OF THINGS AT HOME, OR GET ALONG WITH OTHER PEOPLE: NOT DIFFICULT AT ALL

## 2022-05-14 ASSESSMENT — ANXIETY QUESTIONNAIRES: GAD7 TOTAL SCORE: 2

## 2022-05-25 ENCOUNTER — OFFICE VISIT (OUTPATIENT)
Dept: FAMILY MEDICINE | Facility: CLINIC | Age: 47
End: 2022-05-25
Payer: COMMERCIAL

## 2022-05-25 VITALS
RESPIRATION RATE: 16 BRPM | BODY MASS INDEX: 28.62 KG/M2 | DIASTOLIC BLOOD PRESSURE: 75 MMHG | OXYGEN SATURATION: 98 % | TEMPERATURE: 98.7 F | WEIGHT: 172 LBS | HEART RATE: 83 BPM | SYSTOLIC BLOOD PRESSURE: 113 MMHG

## 2022-05-25 DIAGNOSIS — N73.0 PID (ACUTE PELVIC INFLAMMATORY DISEASE): ICD-10-CM

## 2022-05-25 DIAGNOSIS — R50.9 FEVER, UNSPECIFIED FEVER CAUSE: Primary | ICD-10-CM

## 2022-05-25 LAB
ALBUMIN UR-MCNC: NEGATIVE MG/DL
APPEARANCE UR: CLEAR
BACTERIA #/AREA URNS HPF: ABNORMAL /HPF
BASOPHILS # BLD MANUAL: 0 10E3/UL (ref 0–0.2)
BASOPHILS NFR BLD MANUAL: 0 %
BILIRUB UR QL STRIP: NEGATIVE
CLUE CELLS: ABNORMAL
COLOR UR AUTO: YELLOW
EOSINOPHIL # BLD MANUAL: 0 10E3/UL (ref 0–0.7)
EOSINOPHIL NFR BLD MANUAL: 1 %
ERYTHROCYTE [DISTWIDTH] IN BLOOD BY AUTOMATED COUNT: 13.2 % (ref 10–15)
GLUCOSE UR STRIP-MCNC: NEGATIVE MG/DL
HCT VFR BLD AUTO: 42.1 % (ref 35–47)
HGB BLD-MCNC: 13.8 G/DL (ref 11.7–15.7)
HGB UR QL STRIP: ABNORMAL
HIV 1+2 AB+HIV1 P24 AG SERPL QL IA: NEGATIVE
KETONES UR STRIP-MCNC: NEGATIVE MG/DL
LEUKOCYTE ESTERASE UR QL STRIP: NEGATIVE
LYMPHOCYTES # BLD MANUAL: 1.4 10E3/UL (ref 0.8–5.3)
LYMPHOCYTES NFR BLD MANUAL: 29 %
MCH RBC QN AUTO: 30.1 PG (ref 26.5–33)
MCHC RBC AUTO-ENTMCNC: 32.8 G/DL (ref 31.5–36.5)
MCV RBC AUTO: 92 FL (ref 78–100)
MONOCYTES # BLD MANUAL: 0.3 10E3/UL (ref 0–1.3)
MONOCYTES NFR BLD MANUAL: 6 %
NEUTROPHILS # BLD MANUAL: 3.1 10E3/UL (ref 1.6–8.3)
NEUTROPHILS NFR BLD MANUAL: 64 %
NITRATE UR QL: NEGATIVE
PH UR STRIP: 7 [PH] (ref 5–8)
PLAT MORPH BLD: NORMAL
PLATELET # BLD AUTO: 277 10E3/UL (ref 150–450)
RBC # BLD AUTO: 4.59 10E6/UL (ref 3.8–5.2)
RBC #/AREA URNS AUTO: ABNORMAL /HPF
RBC MORPH BLD: NORMAL
SARS-COV-2 RNA RESP QL NAA+PROBE: NEGATIVE
SP GR UR STRIP: 1.01 (ref 1–1.03)
SQUAMOUS #/AREA URNS AUTO: ABNORMAL /LPF
T PALLIDUM AB SER QL: NONREACTIVE
TRICHOMONAS, WET PREP: ABNORMAL
UROBILINOGEN UR STRIP-ACNC: 0.2 E.U./DL
WBC # BLD AUTO: 4.9 10E3/UL (ref 4–11)
WBC #/AREA URNS AUTO: ABNORMAL /HPF
WBC'S/HIGH POWER FIELD, WET PREP: ABNORMAL
YEAST, WET PREP: ABNORMAL

## 2022-05-25 PROCEDURE — 86704 HEP B CORE ANTIBODY TOTAL: CPT | Performed by: PHYSICIAN ASSISTANT

## 2022-05-25 PROCEDURE — 85027 COMPLETE CBC AUTOMATED: CPT | Performed by: PHYSICIAN ASSISTANT

## 2022-05-25 PROCEDURE — 36415 COLL VENOUS BLD VENIPUNCTURE: CPT | Performed by: PHYSICIAN ASSISTANT

## 2022-05-25 PROCEDURE — 85007 BL SMEAR W/DIFF WBC COUNT: CPT | Performed by: PHYSICIAN ASSISTANT

## 2022-05-25 PROCEDURE — U0005 INFEC AGEN DETEC AMPLI PROBE: HCPCS | Performed by: PHYSICIAN ASSISTANT

## 2022-05-25 PROCEDURE — U0003 INFECTIOUS AGENT DETECTION BY NUCLEIC ACID (DNA OR RNA); SEVERE ACUTE RESPIRATORY SYNDROME CORONAVIRUS 2 (SARS-COV-2) (CORONAVIRUS DISEASE [COVID-19]), AMPLIFIED PROBE TECHNIQUE, MAKING USE OF HIGH THROUGHPUT TECHNOLOGIES AS DESCRIBED BY CMS-2020-01-R: HCPCS | Performed by: PHYSICIAN ASSISTANT

## 2022-05-25 PROCEDURE — 86780 TREPONEMA PALLIDUM: CPT | Performed by: PHYSICIAN ASSISTANT

## 2022-05-25 PROCEDURE — 87591 N.GONORRHOEAE DNA AMP PROB: CPT | Performed by: PHYSICIAN ASSISTANT

## 2022-05-25 PROCEDURE — 87210 SMEAR WET MOUNT SALINE/INK: CPT | Performed by: PHYSICIAN ASSISTANT

## 2022-05-25 PROCEDURE — 99214 OFFICE O/P EST MOD 30 MIN: CPT | Performed by: PHYSICIAN ASSISTANT

## 2022-05-25 PROCEDURE — 87491 CHLMYD TRACH DNA AMP PROBE: CPT | Performed by: PHYSICIAN ASSISTANT

## 2022-05-25 PROCEDURE — 87389 HIV-1 AG W/HIV-1&-2 AB AG IA: CPT | Performed by: PHYSICIAN ASSISTANT

## 2022-05-25 PROCEDURE — 86803 HEPATITIS C AB TEST: CPT | Performed by: PHYSICIAN ASSISTANT

## 2022-05-25 PROCEDURE — 81001 URINALYSIS AUTO W/SCOPE: CPT | Performed by: PHYSICIAN ASSISTANT

## 2022-05-25 RX ORDER — METRONIDAZOLE 500 MG/1
500 TABLET ORAL 2 TIMES DAILY
Qty: 28 TABLET | Refills: 0 | Status: SHIPPED | OUTPATIENT
Start: 2022-05-25 | End: 2022-06-08

## 2022-05-25 RX ORDER — DOXYCYCLINE 100 MG/1
100 CAPSULE ORAL 2 TIMES DAILY
Qty: 28 CAPSULE | Refills: 0 | Status: SHIPPED | OUTPATIENT
Start: 2022-05-25 | End: 2022-06-08

## 2022-05-25 NOTE — PROGRESS NOTES
Assessment & Plan:      Problem List Items Addressed This Visit    None     Visit Diagnoses     Fever, unspecified fever cause    -  Primary    Relevant Orders    UA macro with reflex to Microscopic and Culture - Clinc Collect (Completed)    Wet preparation (Completed)    HIV Antigen Antibody Combo    Treponema Abs w Reflex to RPR and Titer    Hepatitis C antibody    Hepatitis B core antibody    CBC with platelets and differential    Symptomatic; Yes; 5/24/2022 COVID-19 Virus (Coronavirus) by PCR Nose    Chlamydia trachomatis PCR    Neisseria gonorrhoeae PCR    Urine Microscopic (Completed)    PID (acute pelvic inflammatory disease)        Relevant Medications    doxycycline hyclate (VIBRAMYCIN) 100 MG capsule    metroNIDAZOLE (FLAGYL) 500 MG tablet        Medical Decision Making  Patient presents with sudden onset fevers, suprapubic pains, and swollen lymph nodes in the left groin.  Urinalysis is negative for UTI as well as negative wet prep at this time.  Symptoms today concerning for possible pelvic inflammatory disease and recommend initiation of oral antibiotics.  CBC was sent out at the time of the visit, thus do not have a white blood cell count.  Your white blood cell count above 15 with support diagnosis of pelvic inflammatory disease, however recommend patient continue with antibiotics even if white blood cell count is normal.  There is in process COVID-19 as well as STD screening.  Will contact patient if any labs are positive and require change in treatment.     Subjective:      Laure Mendez is a 46 year old female here for evaluation of acute onset of fevers and swollen lymph nodes in the left groin.  Onset of symptoms was yesterday.  Patient has had fevers of 101 max.  Associated symptoms include body aches and fatigue.  Patient does note having unprotected sex 1 month ago.  She also has history of UTIs with very minimal symptoms.  She denies cough, sore throat, rhinorrhea, shortness of breath,  nausea, vomiting, abdominal pains, vaginal discharge, vaginal pain, and dysuria.     The following portions of the patient's history were reviewed and updated as appropriate: allergies, current medications, and problem list.     Review of Systems  Pertinent items are noted in HPI.    Allergies  Allergies   Allergen Reactions     Bactrim [Sulfamethoxazole W-Trimethoprim]        Family History   Problem Relation Age of Onset     Lipids Mother      Thyroid Disease Mother         lobectomy for thyroid nodule     Hypertension Father      Asthma Father      Allergies Father         Asthma     Coronary Artery Disease Father         stent     Pacemaker Father      Allergies Sister         excercise induced asthma     Cardiovascular Paternal Grandfather         PE     Coronary Artery Disease Maternal Grandfather         multiple MI     Genitourinary Problems Sister         multi ASCUS paps     Breast Cancer Paternal Aunt 80     Breast Cancer Paternal Aunt 80     Diabetes No family hx of      Colon Cancer No family hx of        Social History     Tobacco Use     Smoking status: Never Smoker     Smokeless tobacco: Never Used   Substance Use Topics     Alcohol use: Yes     Comment: every day 1-2 drinks         Objective:      /75   Pulse 83   Temp 98.7  F (37.1  C) (Oral)   Resp 16   Wt 78 kg (172 lb)   LMP  (LMP Unknown)   SpO2 98%   BMI 28.62 kg/m    General appearance - alert, well appearing, and in no distress and non-toxic  Ears - bilateral TM's and external ear canals normal  Nose - normal and patent, no erythema, discharge or polyps  Mouth - mucous membranes moist, pharynx normal without lesions  Neck - supple, no significant adenopathy  Chest - clear to auscultation, no wheezes, rales or rhonchi, symmetric air entry  Heart - normal rate, regular rhythm, normal S1, S2, no murmurs, rubs, clicks or gallops  Abdomen - Mild tenderness throughout the lower abdomen, otherwise abdomen soft, normal bowel sounds, no  masses organomegaly  Back exam - No CVA tenderness     Lab & Imaging Results    Results for orders placed or performed in visit on 05/25/22   UA macro with reflex to Microscopic and Culture - Clinc Collect     Status: Abnormal    Specimen: Urine, Clean Catch   Result Value Ref Range    Color Urine Yellow Colorless, Straw, Light Yellow, Yellow    Appearance Urine Clear Clear    Glucose Urine Negative Negative mg/dL    Bilirubin Urine Negative Negative    Ketones Urine Negative Negative mg/dL    Specific Gravity Urine 1.015 1.005 - 1.030    Blood Urine Trace (A) Negative    pH Urine 7.0 5.0 - 8.0    Protein Albumin Urine Negative Negative mg/dL    Urobilinogen Urine 0.2 0.2, 1.0 E.U./dL    Nitrite Urine Negative Negative    Leukocyte Esterase Urine Negative Negative   Urine Microscopic     Status: Abnormal   Result Value Ref Range    Bacteria Urine Few (A) None Seen /HPF    RBC Urine 0-2 0-2 /HPF /HPF    WBC Urine 0-5 0-5 /HPF /HPF    Squamous Epithelials Urine Few (A) None Seen /LPF    Narrative    Urine Culture not indicated   Wet preparation     Status: Abnormal    Specimen: Vagina; Swab   Result Value Ref Range    Trichomonas Absent Absent    Yeast Absent Absent    Clue Cells Absent Absent    WBCs/high power field 1+ (A) None   CBC with platelets and differential     Status: None (In process)    Narrative    The following orders were created for panel order CBC with platelets and differential.  Procedure                               Abnormality         Status                     ---------                               -----------         ------                     CBC with platelets and d...[[id number]]                      In process                   Please view results for these tests on the individual orders.       I personally reviewed these results and discussed findings with the patient.    The use of Dragon/JumpSoft dictation services was used to construct the content of this note; any grammatical errors  are non-intentional. Please contact the author directly if you are in need of any clarification.

## 2022-05-26 ENCOUNTER — MYC MEDICAL ADVICE (OUTPATIENT)
Dept: FAMILY MEDICINE | Facility: CLINIC | Age: 47
End: 2022-05-26
Payer: COMMERCIAL

## 2022-05-26 LAB
C TRACH DNA SPEC QL NAA+PROBE: NEGATIVE
HCV AB SERPL QL IA: NEGATIVE
N GONORRHOEA DNA SPEC QL NAA+PROBE: NEGATIVE

## 2022-05-26 NOTE — TELEPHONE ENCOUNTER
Please review [employer] message-has upcoming appointment    Future Appointments 5/26/2022 - 11/22/2022              Date Visit Type Length Department Provider     5/27/2022  1:20 PM [facility] OFFICE VISIT  40 min  [facility] Fide Vera PA-C                 [employer] message sent to patient.     Concepción CHU RN   [facility] Triage

## 2022-05-27 ENCOUNTER — APPOINTMENT (OUTPATIENT)
Dept: CT IMAGING | Facility: CLINIC | Age: 47
End: 2022-05-27
Attending: EMERGENCY MEDICINE
Payer: COMMERCIAL

## 2022-05-27 ENCOUNTER — HOSPITAL ENCOUNTER (EMERGENCY)
Facility: CLINIC | Age: 47
Discharge: HOME | End: 2022-05-27
Attending: EMERGENCY MEDICINE | Admitting: EMERGENCY MEDICINE
Payer: COMMERCIAL

## 2022-05-27 VITALS
BODY MASS INDEX: 29.02 KG/M2 | HEART RATE: 89 BPM | TEMPERATURE: 99.7 F | RESPIRATION RATE: 18 BRPM | OXYGEN SATURATION: 94 % | SYSTOLIC BLOOD PRESSURE: 116 MMHG | WEIGHT: 170 LBS | DIASTOLIC BLOOD PRESSURE: 70 MMHG | HEIGHT: 64 IN

## 2022-05-27 DIAGNOSIS — R50.9 FEVER IN ADULT: ICD-10-CM

## 2022-05-27 DIAGNOSIS — R59.0 INGUINAL LYMPHADENOPATHY: ICD-10-CM

## 2022-05-27 DIAGNOSIS — B27.90 MONOSPOT TEST POSITIVE: ICD-10-CM

## 2022-05-27 LAB
ALBUMIN SERPL-MCNC: 3.5 G/DL (ref 3.4–5)
ALBUMIN UR-MCNC: 10 MG/DL
ALP SERPL-CCNC: 90 U/L (ref 40–150)
ALT SERPL W P-5'-P-CCNC: 85 U/L (ref 0–50)
ANION GAP SERPL CALCULATED.3IONS-SCNC: 6 MMOL/L (ref 3–14)
APPEARANCE CSF: CLEAR
APPEARANCE UR: CLEAR
AST SERPL W P-5'-P-CCNC: 94 U/L (ref 0–45)
BACTERIA #/AREA URNS HPF: ABNORMAL /HPF
BASOPHILS # BLD MANUAL: 0 10E3/UL (ref 0–0.2)
BASOPHILS NFR BLD MANUAL: 0 %
BILIRUB SERPL-MCNC: 1 MG/DL (ref 0.2–1.3)
BILIRUB UR QL STRIP: NEGATIVE
BUN SERPL-MCNC: 8 MG/DL (ref 7–30)
CALCIUM SERPL-MCNC: 9.1 MG/DL (ref 8.5–10.1)
CHLORIDE BLD-SCNC: 102 MMOL/L (ref 94–109)
CLUE CELLS: ABNORMAL
CO2 SERPL-SCNC: 27 MMOL/L (ref 20–32)
COLOR CSF: COLORLESS
COLOR UR AUTO: YELLOW
CREAT SERPL-MCNC: 0.85 MG/DL (ref 0.52–1.04)
EOSINOPHIL # BLD MANUAL: 0 10E3/UL (ref 0–0.7)
EOSINOPHIL NFR BLD MANUAL: 0 %
ERYTHROCYTE [DISTWIDTH] IN BLOOD BY AUTOMATED COUNT: 13 % (ref 10–15)
GFR SERPL CREATININE-BSD FRML MDRD: 86 ML/MIN/1.73M2
GLUCOSE BLD-MCNC: 93 MG/DL (ref 70–99)
GLUCOSE CSF-MCNC: 56 MG/DL (ref 40–70)
GLUCOSE UR STRIP-MCNC: NEGATIVE MG/DL
GRAM STAIN RESULT: NORMAL
HBV CORE AB SERPL QL IA: NEGATIVE
HCG SERPL QL: NEGATIVE
HCT VFR BLD AUTO: 43.8 % (ref 35–47)
HGB BLD-MCNC: 14.4 G/DL (ref 11.7–15.7)
HGB UR QL STRIP: NEGATIVE
INR PPP: 0.93 (ref 0.85–1.15)
KETONES UR STRIP-MCNC: NEGATIVE MG/DL
LACTATE SERPL-SCNC: 1.5 MMOL/L (ref 0.7–2)
LEUKOCYTE ESTERASE UR QL STRIP: NEGATIVE
LYMPHOCYTES # BLD MANUAL: 1.7 10E3/UL (ref 0.8–5.3)
LYMPHOCYTES NFR BLD MANUAL: 32 %
MCH RBC QN AUTO: 30.1 PG (ref 26.5–33)
MCHC RBC AUTO-ENTMCNC: 32.9 G/DL (ref 31.5–36.5)
MCV RBC AUTO: 91 FL (ref 78–100)
MONOCYTES # BLD MANUAL: 0.3 10E3/UL (ref 0–1.3)
MONOCYTES NFR BLD AUTO: POSITIVE %
MONOCYTES NFR BLD MANUAL: 5 %
NEUTROPHILS # BLD MANUAL: 3.4 10E3/UL (ref 1.6–8.3)
NEUTROPHILS NFR BLD MANUAL: 63 %
NITRATE UR QL: NEGATIVE
PH UR STRIP: 6 [PH] (ref 5–7)
PLAT MORPH BLD: ABNORMAL
PLATELET # BLD AUTO: 232 10E3/UL (ref 150–450)
POTASSIUM BLD-SCNC: 3.3 MMOL/L (ref 3.4–5.3)
PROT CSF-MCNC: 30 MG/DL (ref 15–60)
PROT SERPL-MCNC: 8.2 G/DL (ref 6.8–8.8)
RBC # BLD AUTO: 4.79 10E6/UL (ref 3.8–5.2)
RBC # CSF MANUAL: 1 /UL (ref 0–2)
RBC MORPH BLD: ABNORMAL
RBC URINE: <1 /HPF
SODIUM SERPL-SCNC: 135 MMOL/L (ref 133–144)
SP GR UR STRIP: 1.01 (ref 1–1.03)
SQUAMOUS EPITHELIAL: <1 /HPF
TRICHOMONAS, WET PREP: ABNORMAL
TUBE # CSF: 4
UROBILINOGEN UR STRIP-MCNC: NORMAL MG/DL
VARIANT LYMPHS BLD QL SMEAR: PRESENT
WBC # BLD AUTO: 5.4 10E3/UL (ref 4–11)
WBC # CSF MANUAL: 0 /UL (ref 0–5)
WBC URINE: <1 /HPF
WBC'S/HIGH POWER FIELD, WET PREP: ABNORMAL
YEAST, WET PREP: ABNORMAL

## 2022-05-27 PROCEDURE — 86308 HETEROPHILE ANTIBODY SCREEN: CPT | Performed by: EMERGENCY MEDICINE

## 2022-05-27 PROCEDURE — 250N000013 HC RX MED GY IP 250 OP 250 PS 637: Performed by: EMERGENCY MEDICINE

## 2022-05-27 PROCEDURE — 250N000011 HC RX IP 250 OP 636: Performed by: EMERGENCY MEDICINE

## 2022-05-27 PROCEDURE — 258N000003 HC RX IP 258 OP 636: Performed by: EMERGENCY MEDICINE

## 2022-05-27 PROCEDURE — 87210 SMEAR WET MOUNT SALINE/INK: CPT | Performed by: EMERGENCY MEDICINE

## 2022-05-27 PROCEDURE — 80053 COMPREHEN METABOLIC PANEL: CPT | Performed by: EMERGENCY MEDICINE

## 2022-05-27 PROCEDURE — 250N000009 HC RX 250: Performed by: EMERGENCY MEDICINE

## 2022-05-27 PROCEDURE — 36415 COLL VENOUS BLD VENIPUNCTURE: CPT | Performed by: EMERGENCY MEDICINE

## 2022-05-27 PROCEDURE — 87205 SMEAR GRAM STAIN: CPT | Performed by: EMERGENCY MEDICINE

## 2022-05-27 PROCEDURE — 84703 CHORIONIC GONADOTROPIN ASSAY: CPT | Performed by: EMERGENCY MEDICINE

## 2022-05-27 PROCEDURE — 85007 BL SMEAR W/DIFF WBC COUNT: CPT | Performed by: EMERGENCY MEDICINE

## 2022-05-27 PROCEDURE — 87529 HSV DNA AMP PROBE: CPT | Performed by: EMERGENCY MEDICINE

## 2022-05-27 PROCEDURE — 99285 EMERGENCY DEPT VISIT HI MDM: CPT | Mod: 25

## 2022-05-27 PROCEDURE — 85027 COMPLETE CBC AUTOMATED: CPT | Performed by: EMERGENCY MEDICINE

## 2022-05-27 PROCEDURE — 62270 DX LMBR SPI PNXR: CPT

## 2022-05-27 PROCEDURE — 82945 GLUCOSE OTHER FLUID: CPT | Performed by: EMERGENCY MEDICINE

## 2022-05-27 PROCEDURE — 87799 DETECT AGENT NOS DNA QUANT: CPT | Performed by: EMERGENCY MEDICINE

## 2022-05-27 PROCEDURE — 83605 ASSAY OF LACTIC ACID: CPT | Performed by: EMERGENCY MEDICINE

## 2022-05-27 PROCEDURE — 89050 BODY FLUID CELL COUNT: CPT | Performed by: EMERGENCY MEDICINE

## 2022-05-27 PROCEDURE — 87491 CHLMYD TRACH DNA AMP PROBE: CPT | Performed by: EMERGENCY MEDICINE

## 2022-05-27 PROCEDURE — 96361 HYDRATE IV INFUSION ADD-ON: CPT

## 2022-05-27 PROCEDURE — 87040 BLOOD CULTURE FOR BACTERIA: CPT | Performed by: EMERGENCY MEDICINE

## 2022-05-27 PROCEDURE — C9803 HOPD COVID-19 SPEC COLLECT: HCPCS

## 2022-05-27 PROCEDURE — 96375 TX/PRO/DX INJ NEW DRUG ADDON: CPT

## 2022-05-27 PROCEDURE — 74177 CT ABD & PELVIS W/CONTRAST: CPT

## 2022-05-27 PROCEDURE — 81001 URINALYSIS AUTO W/SCOPE: CPT | Performed by: EMERGENCY MEDICINE

## 2022-05-27 PROCEDURE — 85610 PROTHROMBIN TIME: CPT | Performed by: EMERGENCY MEDICINE

## 2022-05-27 PROCEDURE — 96365 THER/PROPH/DIAG IV INF INIT: CPT | Mod: 59

## 2022-05-27 PROCEDURE — 84157 ASSAY OF PROTEIN OTHER: CPT | Performed by: EMERGENCY MEDICINE

## 2022-05-27 RX ORDER — IOPAMIDOL 755 MG/ML
500 INJECTION, SOLUTION INTRAVASCULAR ONCE
Status: COMPLETED | OUTPATIENT
Start: 2022-05-27 | End: 2022-05-27

## 2022-05-27 RX ORDER — ACETAMINOPHEN 500 MG
1000 TABLET ORAL ONCE
Status: COMPLETED | OUTPATIENT
Start: 2022-05-27 | End: 2022-05-27

## 2022-05-27 RX ORDER — KETOROLAC TROMETHAMINE 15 MG/ML
15 INJECTION, SOLUTION INTRAMUSCULAR; INTRAVENOUS ONCE
Status: COMPLETED | OUTPATIENT
Start: 2022-05-27 | End: 2022-05-27

## 2022-05-27 RX ORDER — LORAZEPAM 2 MG/ML
1 INJECTION INTRAMUSCULAR
Status: COMPLETED | OUTPATIENT
Start: 2022-05-27 | End: 2022-05-27

## 2022-05-27 RX ORDER — CEFTRIAXONE 2 G/1
2 INJECTION, POWDER, FOR SOLUTION INTRAMUSCULAR; INTRAVENOUS ONCE
Status: COMPLETED | OUTPATIENT
Start: 2022-05-27 | End: 2022-05-27

## 2022-05-27 RX ORDER — LIDOCAINE HYDROCHLORIDE 10 MG/ML
INJECTION, SOLUTION EPIDURAL; INFILTRATION; INTRACAUDAL; PERINEURAL
Status: DISCONTINUED
Start: 2022-05-27 | End: 2022-05-27 | Stop reason: HOSPADM

## 2022-05-27 RX ORDER — MORPHINE SULFATE 2 MG/ML
2 INJECTION, SOLUTION INTRAMUSCULAR; INTRAVENOUS
Status: COMPLETED | OUTPATIENT
Start: 2022-05-27 | End: 2022-05-27

## 2022-05-27 RX ORDER — SODIUM CHLORIDE 9 MG/ML
INJECTION, SOLUTION INTRAVENOUS CONTINUOUS
Status: DISCONTINUED | OUTPATIENT
Start: 2022-05-27 | End: 2022-05-27 | Stop reason: HOSPADM

## 2022-05-27 RX ADMIN — SODIUM CHLORIDE 1000 ML: 9 INJECTION, SOLUTION INTRAVENOUS at 11:56

## 2022-05-27 RX ADMIN — CEFTRIAXONE 2 G: 2 INJECTION, POWDER, FOR SOLUTION INTRAMUSCULAR; INTRAVENOUS at 16:50

## 2022-05-27 RX ADMIN — LORAZEPAM 1 MG: 2 INJECTION INTRAMUSCULAR; INTRAVENOUS at 14:46

## 2022-05-27 RX ADMIN — SODIUM CHLORIDE 61 ML: 9 INJECTION, SOLUTION INTRAVENOUS at 14:27

## 2022-05-27 RX ADMIN — MORPHINE SULFATE 2 MG: 2 INJECTION, SOLUTION INTRAMUSCULAR; INTRAVENOUS at 15:46

## 2022-05-27 RX ADMIN — IOPAMIDOL 85 ML: 755 INJECTION, SOLUTION INTRAVENOUS at 14:27

## 2022-05-27 RX ADMIN — KETOROLAC TROMETHAMINE 15 MG: 15 INJECTION, SOLUTION INTRAMUSCULAR; INTRAVENOUS at 12:03

## 2022-05-27 RX ADMIN — ACETAMINOPHEN 1000 MG: 500 TABLET, FILM COATED ORAL at 16:59

## 2022-05-27 ASSESSMENT — ENCOUNTER SYMPTOMS
HEMATURIA: 0
DIFFICULTY URINATING: 0
ADENOPATHY: 1
WOUND: 0
FATIGUE: 1
COUGH: 0
FEVER: 1
NECK STIFFNESS: 1
MYALGIAS: 1
CHILLS: 1
DYSURIA: 0
LIGHT-HEADEDNESS: 1
HEADACHES: 1
FREQUENCY: 0

## 2022-05-27 NOTE — ED PROVIDER NOTES
History   Chief Complaint:  Groin Swelling       HPI   Laure Mendez is a 46 year old female with history of anxiety who presents with a swollen lymph node of her left groin that started 4 days ago. She was seen at  for this and started on Doxycycline and Flagyl.  The subsequent day she developed fatigue, fever of 102.0 orally, myalgias, and chills. She has noticed associated left inguinal lymph node. She also developed a constant headache that worsens when she moves her head or neck. Also notes neck tightness. Denies a history of frequent headaches. The morning she got up to take some Tylenol when she became lightheaded. She laid on the floor, drank some water, and decided to take a warm bath. After her bath she ended up sleeping on the bathroom floor, became diaphoretic, and suspects she broke her fever. Ultimately, her symptoms are not getting better prompting her evaluation. Denies any cough, wounds, pelvic pain, vaginal or urinary symptoms. Admits that she was in a recent relationship and had unprotected intercourse. No concern for STDs. No recent tick exposure.     Lab Results (5/25/22):   CBC: WBC 4.9, HGB 13.8,      Wet prep: WBC: 1+ (A)    Chlamydia trachomatis PCR: negative  Neisseria gonorrhea PCR: negative    UA with microscopic: Blood: Trace (A), Bacteria urine: Few (A), Squamous Epithelials Urine: Few (A)o/w WNL     COVID-19 PCR: negative    HIV Antigen Combo: negative    Treponema Antibody Total: nonreactive    Hepatitis C Antibody: negative  Hepatitis B Antibody: negative    Review of Systems   Constitutional: Positive for chills, fatigue and fever.   Respiratory: Negative for cough.    Genitourinary: Negative for difficulty urinating, dysuria, frequency, hematuria, pelvic pain, vaginal bleeding and vaginal discharge.   Musculoskeletal: Positive for myalgias and neck stiffness.   Skin: Negative for wound.   Neurological: Positive for light-headedness and headaches.   Hematological:  "Positive for adenopathy.   All other systems reviewed and are negative.    Allergies:  Bactrim [Sulfamethoxazole W-Trimethoprim]    Medications:  Wellbutrin  Vibramycin  Atarax  Flagyl  Microgestin  Zoloft    Past Medical History:     Acute laryngitis  Anxiety    Past Surgical History:    Mouth surgery  Soft tissue surgery  [place] teeth extraction  Cervical lymph node biopsy  Tonsillectomy  Colposcopy    Family History:    Mother: Hyperlipidemia, thyroid disease  Father: Hypertension, asthma, CAD  Sister: ASCUS    Social History:  The patient presents to the ED alone.   Lives alone.     Physical Exam     Patient Vitals for the past 24 hrs:   BP Temp Temp src Pulse Resp SpO2 Height Weight   05/27/22 1400 124/75 -- -- 80 -- -- -- --   05/27/22 1359 -- -- -- 82 18 95 % -- --   05/27/22 1300 -- -- -- 82 28 94 % -- --   05/27/22 1205 -- 99.7  F (37.6  C) Oral -- -- -- -- --   05/27/22 1200 124/71 -- -- 77 29 91 % -- --   05/27/22 1003 126/78 97.3  F (36.3  C) -- 87 18 99 % 1.626 m (5' 4\") 77.1 kg (170 lb)   05/27/22 1002 126/78 -- -- 85 18 99 % -- --       Physical Exam  VS: Reviewed per above  HENT: Mucous membranes moist, no nuchal rigidity  EYES: sclera anicteric  CV: Rate as noted,  regular rhythm.   RESP: Effort normal. Breath sounds are normal bilaterally.  GI: no tenderness/rebound/guarding, not distended. Enlarged left inguinal lymph nodes  Chaperone pelvic exam: Mild whitish vaginal discharge, no CMT, no external genital lesions appreciated.  NEURO: GCS 15, cranial nerves II through XII are intact, 5 out of 5 strength in all 4 extremities, sensation is intact light touch in all 4 extremities  MSK: No deformity of the extremities  SKIN: Warm and dry    Emergency Department Course     Imaging:  CT Abdomen Pelvis w Contrast   Final Result   IMPRESSION:    1.  Indeterminate left inguinal and retroperitoneal lymphadenopathy,   diagnosis of exclusion is hematologic neoplasm. Inguinal nodes would   be amenable to " percutaneous ultrasound-guided sampling.      EMILY VARELA MD            SYSTEM ID:  [id number]        Report per radiology    Laboratory:  Labs Ordered and Resulted from Time of ED Arrival to Time of ED Departure   COMPREHENSIVE METABOLIC PANEL - Abnormal       Result Value    Sodium 135      Potassium 3.3 (*)     Chloride 102      Carbon Dioxide (CO2) 27      Anion Gap 6      Urea Nitrogen 8      Creatinine 0.85      Calcium 9.1      Glucose 93      Alkaline Phosphatase 90      AST 94 (*)     ALT 85 (*)     Protein Total 8.2      Albumin 3.5      Bilirubin Total 1.0      GFR Estimate 86     ROUTINE UA WITH MICROSCOPIC REFLEX TO CULTURE - Abnormal    Color Urine Yellow      Appearance Urine Clear      Glucose Urine Negative      Bilirubin Urine Negative      Ketones Urine Negative      Specific Gravity Urine 1.013      Blood Urine Negative      pH Urine 6.0      Protein Albumin Urine 10  (*)     Urobilinogen Urine Normal      Nitrite Urine Negative      Leukocyte Esterase Urine Negative      Bacteria Urine Few (*)     RBC Urine <1      WBC Urine <1      Squamous Epithelials Urine <1     DIFFERENTIAL - Abnormal    % Neutrophils 63      % Lymphocytes 32      % Monocytes 5      % Eosinophils 0      % Basophils 0      Absolute Neutrophils 3.4      Absolute Lymphocytes 1.7      Absolute Monocytes 0.3      Absolute Eosinophils 0.0      Absolute Basophils 0.0      RBC Morphology Confirmed RBC Indices      Platelet Assessment        Value: Automated Count Confirmed. Platelet morphology is normal.    Reactive Lymphocytes Present (*)    MONONUCLEOSIS SCREEN - Abnormal    Mononucleosis Screen Positive (*)    WET PREPARATION - Abnormal    Trichomonas Absent      Yeast Absent      Clue Cells Absent      WBCs/high power field 1+ (*)    HCG QUALITATIVE PREGNANCY - Normal    hCG Serum Qualitative Negative     INR - Normal    INR 0.93     LACTIC ACID WHOLE BLOOD - Normal    Lactic Acid 1.5     CBC WITH PLATELETS AND DIFFERENTIAL  - Normal    WBC Count 5.4      RBC Count 4.79      Hemoglobin 14.4      Hematocrit 43.8      MCV 91      MCH 30.1      MCHC 32.9      RDW 13.0      Platelet Count 232     GLUCOSE CSF   PROTEIN TOTAL CSF   CELL COUNT CSF   CELL COUNT CSF   BLOOD CULTURE   BLOOD CULTURE   CHLAMYDIA TRACHOMATIS/NEISSERIA GONORRHOEAE BY PCR   GRAM STAIN   AEROBIC BACTERIAL CULTURE ROUTINE   HERPES SIMPLEX VIRUS 1&2 PCR   CELL COUNT WITH DIFFERENTIAL CSF   CELL COUNT WITH DIFFERENTIAL CSF        Procedures    Lumbar Puncture      Procedure: Lumbar Puncture      Indication: headache and fever     Consent: Written from Patient  Risks Discussed (including but not limited to): Infection, Bleeding, Spinal headache with possibility of spinal patch and Temporary or permanent neurological injury     Universal Protocol: Universal protocol was followed and time out conducted just prior to starting procedure, confirming patient identity, site/side, procedure, patient position, and availability of correct equipment and implants.      Anesthesia: Lidocaine - 1%    Anxiolysis: lorazepam   Sedation: No sedation.     Procedure Note:     Patient was placed in a sitting position.  The skin overlying the L3-4 area was prepped with povidone-iodine.    The patient was medicated as above.   A 22 gauge spinal needle was used to gain access to the subarachnoid space with stylet in place.   Opening pressure was not measured.   The fluid was clear.   Stylet was replaced and needle withdrawn.      Patient Status:  The patient tolerated the procedure well: Yes. There were no complications.    Emergency Department Course:         Reviewed:  I reviewed nursing notes, vitals, past medical history and Care Everywhere    Assessments:  1031 I obtained history and examined the patient as noted above.     1250 I rechecked and updated the patient.     1334 I rechecked the patient.     1453 I rechecked the patient and performed procedure as above.     Consults:  Oncology-   [facility]- dr hayes    Interventions:  1156 NS 1000mL IV  1203 Toradol 15mg IV  1446 Ativan 1 mg IV    Disposition:  Care of the patient was transferred to my colleague Dr. Reynolds pending lumbar puncture results.     Impression & Plan     Medical Decision Making:  Patient presents to the ER for evaluation of fever, headache, left inguinal lymphadenopathy over the past 4 to 5 days.  On arrival vital signs are reassuring.  She has enlarged left inguinal lymph node but otherwise unremarkable abdominal exam.  Pelvic exam without concerning genital lesions or CMT.  She has been taking doxycycline and Flagyl, for possible PID.  I reviewed labs from urgent care 2 days ago which included negative syphilis, HIV, gonorrhea, chlamydia testing.  COVID testing also negative.  Her labs today do show positive mononucleosis screen, mild transaminitis.  Certainly she could be suffering from acute EBV infection.  Urinalysis without evidence of infection, no leukocytosis or lactic acidosis.  No history to support pneumonia.  No history or exam findings of skin or soft tissue infection. I did obtain gonorrhea and Chlamydia testing again today as last time she performed self swab.  CT of the abdomen shows enlarged left inguinal, abdominal wall, retroperitoneal lymph nodes, differential included hematologic malignancy. I spoke with oncology with recommendation for follow-up in their clinic but no need for emergent oncologic work-up. I spoke with ID who mentioned we could add EBV DNA PCR and give the rocephin that was not given at last  visit (possible PID). At signout to my colleague, CSF studies pending.  If no signs of meningitis on CSF,  plan for discharge with close outpatient follow-up.    Diagnosis:    ICD-10-CM    1. Fever in adult  R50.9    2. Inguinal lymphadenopathy  R59.0    3. Monospot test positive  B27.90        Discharge Medications:  New Prescriptions    No medications on file     Scribe Disclosure:  LAURA, Harvey Veronica,  am serving as a scribe at 10:33 AM on 5/27/2022 to document services personally performed by Renan Carias MD based on my observations and the provider's statements to me.     I, Harvey Veronica, am serving as a scribe  at 2:54 PM on 5/27/2022 to document services personally performed by Renan Carias MD based on my observations and the provider's statements to me.          Renan Carias MD  05/27/22 1548       Renan Carias MD  05/27/22 1549       Renan Carias MD  05/27/22 1558

## 2022-05-27 NOTE — ED TRIAGE NOTES
ABCs intact. Pt c/o L groin swelling starting about Monday. Pt c/o headache, fever, body aches starting Tuesday. Pt went into UC on Thursday. Pt was started on doxycycline and flagyl. Last tylenol: 0500.

## 2022-05-27 NOTE — LETTER
May 27, 2022      To Whom It May Concern:      Laure Mendez was seen in our Emergency Department today, 05/27/22.  Please excuse her from work the week of 5/30/22. She may return sooner as symptoms allow.    Sincerely,        Renan Carias MD

## 2022-05-28 LAB
C TRACH DNA SPEC QL PROBE+SIG AMP: NEGATIVE
HSV1 DNA CSF QL NAA+PROBE: NOT DETECTED
HSV2 DNA CSF QL NAA+PROBE: NOT DETECTED
N GONORRHOEA DNA SPEC QL NAA+PROBE: NEGATIVE

## 2022-05-28 NOTE — ED PROVIDER NOTES
Patient signed out to me by Dr. Carias pending CSF results.  Please see his note for further details regarding patient presentation.  CSF negative for acute etiology.  Reviewed labs with patient with suspicion for mononucleosis as the cause of her symptoms.  We discussed that she can likely discontinue the antibiotics if her STD cultures continue to be negative as she reports they are making her sick.  We also discussed max Tylenol/ibuprofen dosing, but encouraged only 3000 mg/day acetaminophen given mild LFT elevations.  All patient's questions answered.  She is discharged in stable condition.     Jonah Barnes MD  05/27/22 [id number]

## 2022-05-29 ENCOUNTER — HOSPITAL ENCOUNTER (EMERGENCY)
Facility: CLINIC | Age: 47
Discharge: HOME | End: 2022-05-29
Attending: EMERGENCY MEDICINE | Admitting: EMERGENCY MEDICINE
Payer: COMMERCIAL

## 2022-05-29 ENCOUNTER — APPOINTMENT (OUTPATIENT)
Dept: CT IMAGING | Facility: CLINIC | Age: 47
End: 2022-05-29
Attending: EMERGENCY MEDICINE
Payer: COMMERCIAL

## 2022-05-29 VITALS
SYSTOLIC BLOOD PRESSURE: 96 MMHG | OXYGEN SATURATION: 92 % | DIASTOLIC BLOOD PRESSURE: 49 MMHG | RESPIRATION RATE: 16 BRPM | TEMPERATURE: 98.1 F | HEART RATE: 82 BPM

## 2022-05-29 DIAGNOSIS — B27.90 INFECTIOUS MONONUCLEOSIS WITHOUT COMPLICATION, INFECTIOUS MONONUCLEOSIS DUE TO UNSPECIFIED ORGANISM: ICD-10-CM

## 2022-05-29 DIAGNOSIS — R51.9 ACUTE INTRACTABLE HEADACHE, UNSPECIFIED HEADACHE TYPE: ICD-10-CM

## 2022-05-29 LAB
HOLD SPECIMEN: NORMAL

## 2022-05-29 PROCEDURE — 96375 TX/PRO/DX INJ NEW DRUG ADDON: CPT

## 2022-05-29 PROCEDURE — 96366 THER/PROPH/DIAG IV INF ADDON: CPT

## 2022-05-29 PROCEDURE — 250N000011 HC RX IP 250 OP 636: Performed by: EMERGENCY MEDICINE

## 2022-05-29 PROCEDURE — 99285 EMERGENCY DEPT VISIT HI MDM: CPT | Mod: 25

## 2022-05-29 PROCEDURE — 70450 CT HEAD/BRAIN W/O DYE: CPT

## 2022-05-29 PROCEDURE — 258N000003 HC RX IP 258 OP 636: Performed by: EMERGENCY MEDICINE

## 2022-05-29 PROCEDURE — 96365 THER/PROPH/DIAG IV INF INIT: CPT

## 2022-05-29 PROCEDURE — 96361 HYDRATE IV INFUSION ADD-ON: CPT

## 2022-05-29 RX ORDER — METOCLOPRAMIDE HYDROCHLORIDE 5 MG/ML
10 INJECTION INTRAMUSCULAR; INTRAVENOUS ONCE
Status: COMPLETED | OUTPATIENT
Start: 2022-05-29 | End: 2022-05-29

## 2022-05-29 RX ORDER — KETOROLAC TROMETHAMINE 15 MG/ML
15 INJECTION, SOLUTION INTRAMUSCULAR; INTRAVENOUS ONCE
Status: COMPLETED | OUTPATIENT
Start: 2022-05-29 | End: 2022-05-29

## 2022-05-29 RX ORDER — DIPHENHYDRAMINE HYDROCHLORIDE 50 MG/ML
50 INJECTION INTRAMUSCULAR; INTRAVENOUS ONCE
Status: COMPLETED | OUTPATIENT
Start: 2022-05-29 | End: 2022-05-29

## 2022-05-29 RX ORDER — MAGNESIUM SULFATE HEPTAHYDRATE 40 MG/ML
2 INJECTION, SOLUTION INTRAVENOUS ONCE
Status: COMPLETED | OUTPATIENT
Start: 2022-05-29 | End: 2022-05-29

## 2022-05-29 RX ORDER — ONDANSETRON 4 MG/1
4 TABLET, ORALLY DISINTEGRATING ORAL EVERY 6 HOURS PRN
Qty: 10 TABLET | Refills: 0 | Status: SHIPPED | OUTPATIENT
Start: 2022-05-29 | End: 2022-06-01

## 2022-05-29 RX ORDER — DEXAMETHASONE SODIUM PHOSPHATE 10 MG/ML
10 INJECTION, SOLUTION INTRAMUSCULAR; INTRAVENOUS ONCE
Status: COMPLETED | OUTPATIENT
Start: 2022-05-29 | End: 2022-05-29

## 2022-05-29 RX ADMIN — METOCLOPRAMIDE HYDROCHLORIDE 10 MG: 5 INJECTION INTRAMUSCULAR; INTRAVENOUS at 17:55

## 2022-05-29 RX ADMIN — SODIUM CHLORIDE 1000 ML: 9 INJECTION, SOLUTION INTRAVENOUS at 17:54

## 2022-05-29 RX ADMIN — DIPHENHYDRAMINE HYDROCHLORIDE 50 MG: 50 INJECTION INTRAMUSCULAR; INTRAVENOUS at 17:55

## 2022-05-29 RX ADMIN — KETOROLAC TROMETHAMINE 15 MG: 15 INJECTION, SOLUTION INTRAMUSCULAR; INTRAVENOUS at 17:55

## 2022-05-29 RX ADMIN — DEXAMETHASONE SODIUM PHOSPHATE 10 MG: 10 INJECTION, SOLUTION INTRAMUSCULAR; INTRAVENOUS at 17:55

## 2022-05-29 RX ADMIN — SODIUM CHLORIDE 1000 ML: 9 INJECTION, SOLUTION INTRAVENOUS at 20:25

## 2022-05-29 RX ADMIN — MAGNESIUM SULFATE HEPTAHYDRATE 2 G: 40 INJECTION, SOLUTION INTRAVENOUS at 17:54

## 2022-05-29 NOTE — ED PROVIDER NOTES
History     Chief Complaint:  Headache, Nausea, and Shortness of Breath       HPI   Laure Mendez is a 46 year old female who presents with headache and nausea.  She has had a headache and nausea for the past 6 days.  It is frontal, constant, worsened with head movement, coughing, sneezing.  She also has developed some photophobia and phonophobia with the headache.  She denies a history of headaches.  She had fairly extensive work-up over this week with a visit to urgent care as well as to the ER.  During that time she was having some abdominal pain so they were concerned for PID.  She was started on antibiotics for this but after chlamydia, gonorrhea, other swabs came back as negative she stopped them.  She is not having abdominal pain.  For her headache she had an LP which was unremarkable however she did have a monotest which was positive.  She also reports some nausea and diarrhea and some myalgias.  She states the headache is the same as it has been when she had the visit for her LP, etc.  ROS:  Review of Systems  Positive-headache, photophobia, phonophobia, nausea, diarrhea, myalgias  Negative-nuchal rigidity, fever, focal neurologic deficit  Remaining 10 point ROS negative    Allergies:  Bactrim [Sulfamethoxazole W-Trimethoprim]     Medications:    buPROPion (WELLBUTRIN XL) 150 MG 24 hr tablet  doxycycline hyclate (VIBRAMYCIN) 100 MG capsule  ferrous sulfate (FEROSUL) 325 (65 Fe) MG tablet  fluticasone (FLONASE) 50 MCG/ACT nasal spray  hydrOXYzine (ATARAX) 25 MG tablet  loratadine (CLARITIN) 10 MG tablet  metroNIDAZOLE (FLAGYL) 500 MG tablet  multivitamin (ONE-DAILY) tablet  norethindrone-ethinyl estradiol (MICROGESTIN 1/20) 1-20 MG-MCG tablet  sertraline (ZOLOFT) 50 MG tablet  Vitamin D, Cholecalciferol, 1000 units TABS        Past Medical History:    Past Medical History:   Diagnosis Date     Acute laryngitis 11/17/2015     Allergic rhinitis 10/14/2003     Allergic rhinitis, cause unspecified       Anxiety 7/1/2011     ASCUS on Pap smear 2000       Past Surgical History:    Past Surgical History:   Procedure Laterality Date     MOUTH SURGERY       SOFT TISSUE SURGERY       ZZC NONSPECIFIC PROCEDURE      wisdom teeth     ZZC NONSPECIFIC PROCEDURE      cervical Lymph node bx-related to tonsils     ZZC NONSPECIFIC PROCEDURE      Tonsillectomy     ZZC NONSPECIFIC PROCEDURE  2000    colposcopy s/p ASCUS        Family History:    family history includes Allergies in her father and sister; Asthma in her father; Breast Cancer (age of onset: 80) in her paternal aunt and paternal aunt; Cardiovascular in her paternal grandfather; Coronary Artery Disease in her father and maternal grandfather; Genitourinary Problems in her sister; Hypertension in her father; Lipids in her mother; Pacemaker in her father; Thyroid Disease in her mother.    Social History:   reports that she has never smoked. She has never used smokeless tobacco. She reports current alcohol use. She reports that she does not use drugs.  PCP: Fide Vera     Physical Exam     Patient Vitals for the past 24 hrs:   BP Temp Temp src Pulse Resp SpO2   05/29/22 2100 96/49 -- -- 82 -- 92 %   05/29/22 2030 108/43 -- -- 84 -- 94 %   05/29/22 2010 98/53 -- -- -- -- 93 %   05/29/22 1915 102/48 -- -- 89 -- 93 %   05/29/22 1910 102/48 -- -- -- -- --   05/29/22 1721 111/65 98.1  F (36.7  C) Temporal 76 16 96 %        Physical Exam  General/Appearance: appears stated age, well-groomed, appears mildly uncomfortable and shielding her eyes from the light  Eyes: EOMI, no scleral injection, no icterus  ENT: MMM  Neck: supple, nl ROM, no stiffness  Cardiovascular: RRR, nl S1S2, no m/r/g, 2+ pulses in all 4 extremities, cap refill <2sec  Respiratory: CTAB, good air movement throughout, no wheezes/rhonchi/rales, no increased WOB, no retractions  GI: abd soft, non-distended, nttp,  no HSM, no rebound, no guarding, nl BS  MSK: BENAVIDEZ, good tone, no bony  abnormality  Skin: warm and well-perfused, no rash, no edema, no ecchymosis, nl turgor  Neuro: GCS 15, alert and oriented, no gross focal neuro deficits  Psych: interacts appropriately  Heme: no petechia, no purpura, no active bleeding  LAD: L inguinal LAD      Emergency Department Course     Imaging:  CT Head w/o Contrast   Final Result   IMPRESSION:   1.  Normal head CT.         Report per radiology      Emergency Department Course:    Reviewed:  I reviewed nursing notes, vitals and past medical history    Assessments:   I obtained history and examined the patient as noted above.   2000 I rechecked the patient and explained findings. Will do a 2nd L of IVF  2105 I rechecked the pt. Her HA is better, though not gone, but she feels ready for discharge.     Interventions:  Medications   metoclopramide (REGLAN) injection 10 mg (10 mg Intravenous Given 5/29/22 1755)   dexamethasone PF (DECADRON) injection 10 mg (10 mg Intravenous Given 5/29/22 1755)   diphenhydrAMINE (BENADRYL) injection 50 mg (50 mg Intravenous Given 5/29/22 1755)   ketorolac (TORADOL) injection 15 mg (15 mg Intravenous Given 5/29/22 1755)   0.9% sodium chloride BOLUS (0 mLs Intravenous Stopped 5/29/22 2024)   magnesium sulfate 2 g in water intermittent infusion (0 g Intravenous Stopped 5/29/22 2017)   0.9% sodium chloride BOLUS (0 mLs Intravenous Stopped 5/29/22 2103)        Disposition:  The patient was discharged to home.     Impression & Plan      Medical Decision Making:  This patient is a pleasant 46-year-old female who was recently diagnosed with mono who presents with headache and nausea.  She had LP done just a few days ago which is unremarkable.  Given that she does not have history of headaches I was concerned that maybe there was something intraparenchymal causing it such as a bleed or tumor.  Head CT was obtained which thankfully was unremarkable.  At this point in time I suspect her headache is part of the mono process.  She does have a  left inguinal lymphadenopathy that likely is due to the mono but also could be an oncologic issue she knows that that she needs to schedule an outpatient follow-up with oncology urgently.  Otherwise she feels better after the above interventions and ready for discharge.  Diagnosis:    ICD-10-CM    1. Acute intractable headache, unspecified headache type  R51.9    2. Infectious mononucleosis without complication, infectious mononucleosis due to unspecified organism  B27.90         Discharge Medications:  New Prescriptions    No medications on file        5/29/2022   Roselyn Hernadez*        Roselyn Hernadez MD  05/29/22 2106

## 2022-05-29 NOTE — ED TRIAGE NOTES
Presents with headache and nausea. Was recently seen in ER for same. Was placed on flagyl and doxycyline, has had diarrhea since. States headache has been worsening.      Triage Assessment     Row Name 05/29/22 8512       Triage Assessment (Adult)    Airway WDL WDL       Cardiac WDL    Cardiac WDL WDL

## 2022-06-01 ENCOUNTER — OFFICE VISIT (OUTPATIENT)
Dept: FAMILY MEDICINE | Facility: CLINIC | Age: 47
End: 2022-06-01
Payer: COMMERCIAL

## 2022-06-01 VITALS
HEIGHT: 64 IN | TEMPERATURE: 97.9 F | WEIGHT: 174 LBS | BODY MASS INDEX: 29.71 KG/M2 | DIASTOLIC BLOOD PRESSURE: 64 MMHG | OXYGEN SATURATION: 95 % | HEART RATE: 83 BPM | SYSTOLIC BLOOD PRESSURE: 114 MMHG

## 2022-06-01 DIAGNOSIS — B27.90 MONOSPOT TEST POSITIVE: ICD-10-CM

## 2022-06-01 DIAGNOSIS — R74.8 ELEVATED LIVER ENZYMES: Primary | ICD-10-CM

## 2022-06-01 DIAGNOSIS — E87.6 HYPOKALEMIA: ICD-10-CM

## 2022-06-01 DIAGNOSIS — R74.8 ELEVATED LIVER ENZYMES: ICD-10-CM

## 2022-06-01 DIAGNOSIS — N28.0 RENAL INFARCT (H): ICD-10-CM

## 2022-06-01 DIAGNOSIS — R59.0 INGUINAL LYMPHADENOPATHY: Primary | ICD-10-CM

## 2022-06-01 DIAGNOSIS — Z20.822 EXPOSURE TO 2019 NOVEL CORONAVIRUS: ICD-10-CM

## 2022-06-01 LAB
ALBUMIN SERPL-MCNC: 2.9 G/DL (ref 3.4–5)
ALP SERPL-CCNC: 211 U/L (ref 40–150)
ALT SERPL W P-5'-P-CCNC: 577 U/L (ref 0–50)
ANION GAP SERPL CALCULATED.3IONS-SCNC: 7 MMOL/L (ref 3–14)
AST SERPL W P-5'-P-CCNC: 406 U/L (ref 0–45)
BACTERIA BLD CULT: NO GROWTH
BACTERIA BLD CULT: NO GROWTH
BACTERIA CSF CULT: NO GROWTH
BILIRUB SERPL-MCNC: 1 MG/DL (ref 0.2–1.3)
BUN SERPL-MCNC: 7 MG/DL (ref 7–30)
CALCIUM SERPL-MCNC: 8.9 MG/DL (ref 8.5–10.1)
CHLORIDE BLD-SCNC: 105 MMOL/L (ref 94–109)
CO2 SERPL-SCNC: 26 MMOL/L (ref 20–32)
CREAT SERPL-MCNC: 0.75 MG/DL (ref 0.52–1.04)
EBV DNA COPIES/ML, INSTRUMENT: ABNORMAL COPIES/ML
EBV DNA SPEC NAA+PROBE-LOG#: 6.2 {LOG_COPIES}/ML
GFR SERPL CREATININE-BSD FRML MDRD: >90 ML/MIN/1.73M2
GLUCOSE BLD-MCNC: 85 MG/DL (ref 70–99)
GRAM STAIN RESULT: NORMAL
GRAM STAIN RESULT: NORMAL
POTASSIUM BLD-SCNC: 3.3 MMOL/L (ref 3.4–5.3)
PROT SERPL-MCNC: 7.4 G/DL (ref 6.8–8.8)
SODIUM SERPL-SCNC: 138 MMOL/L (ref 133–144)

## 2022-06-01 PROCEDURE — 99214 OFFICE O/P EST MOD 30 MIN: CPT | Performed by: PHYSICIAN ASSISTANT

## 2022-06-01 PROCEDURE — 80053 COMPREHEN METABOLIC PANEL: CPT | Performed by: PHYSICIAN ASSISTANT

## 2022-06-01 PROCEDURE — 36415 COLL VENOUS BLD VENIPUNCTURE: CPT | Performed by: PHYSICIAN ASSISTANT

## 2022-06-01 NOTE — RESULT ENCOUNTER NOTE
Called pt and LM regarding worsening of LFTs. EBV PCR returned elevated, thus, still may be c/w mono. Previous hep B, Hep C testing was negative. Need to continue to trend liver enzymes and would repeat early next week for ongoing monitoring. Avoid any additional tylenol in the meantime. Reviewed potential DDx autoimmune hepatitis from doxycycline as rare SE, but off since last week so less likely contributor. Advised to ER in the meantime with any sx of liver failure to include tea-colored urine, abdominal pain/vomiting, icterus, etc. Advised return call to ensure she received message.  Electronically Signed By: Fide Vera PA-C

## 2022-06-01 NOTE — PROGRESS NOTES
"  Assessment & Plan     Laure was seen today for er f/u.    Diagnoses and all orders for this visit:    Inguinal lymphadenopathy  Monospot test positive  Hypokalemia  Elevated liver enzymes  Exposure to 2019 novel coronavirus  -     45 yo female with fever, body aches, nausea, HA, L inguinal adenopathy since 5/23 s/p UC visit and 2 ER visits. Work-up identified elevated liver enzymes, reactive lymphs and positive monospot. Fortunately had neg lumbar puncture and head CT with improving HA. Hx complicated by trial of empiric doxycycline and flagly for pelvic inflammatory disease, but all STD testing negative. Hx suggests diarrhea likely had been SE caused by these as improving now that they've been discontinued. Potassium slightly low at 3.3 at ER discharge so will repeat for stability and to trend liver enzymes. CT showed retroperitoneal lymphadenopathy so additional consult advised with oncology to ensure no hematologic neoplasm as alternative etiology for sx. Pt had to reschedule oncology f/u until 6/8 due to exposure to mom who currently has COVID. Pt reports home neg test today.   - Comprehensive metabolic panel (BMP + Alb, Alk Phos, ALT, AST, Total. Bili, TP); Future  -     Comprehensive metabolic panel (BMP + Alb, Alk Phos, ALT, AST, Total. Bili, TP)    Renal infarct (H) - \"prior infarction\" R upper pole - incidental finding on 5/27/2022 CT scan    -  Incidental finding on CT scan and pt reports no known hx of this. Encouraged to review oncology as well to see if any additional clotting work-up advised. Can always consider nephrology consult if needed as well. Renal function appears stable/nl.      Return in about 1 week (around 6/8/2022) for oncology consult .    Fide Vera PA-C  [facility] SHELBY Bruner is a 46 year old who presents for the following health issues     HPI     ED/UC Followup:    Facility:  [facility] Emergency Department  Date of visit: 5/27 & " 5/29  Reason for visit: Felt swollen node in L groin starting 5/23 then 5/24 developed fever, HA, fatigue and body aches. Fever at home was 102, but improved with antipyretics. Originally seen in UC empirically treated with flagyl and doxy for possible PID given hx of unprotected sex in preceding month, but no pelvic exam performed. All STD tests returned normal and repeated in ER (GC, HIV, treponema, Hep C and B, HSV via CSF, wet prep) so not felt to be c/w PID. CBC showed reactive lymphs, CMP mild elevated liver enzymes and positive monospot so felt sx could be c/w diagnosis of Mononuclosis. Neg COVID. Due to ongoing HA had lumbar puncture which was negative. Neg blood cultures after 4 days. Had one episode of vomiting following this ER visit and attributes to all the meds. No recurrence. Has had diarrhea ongoing, but unsure if was due to initial empiric abx treatment as stools improving since discontinuation on 5/29. Stools getting back to normal yesterday. Due to severity was seen in ER again on 5/29 and had head CT which fortunately was negative.   Denies sore throat. Endorses soreness in her neck, but no painful swallowing. Just today noticed a few sore nodes. S/p tonsillectomy.   EBV PCR pending    Advised additional consult with oncology to exclude hematologic neoplasm in the differential given CT findings of additional retroperitoneal adenopathy. Had this scheduled for 6/3, but had to re-schedule for 6/8 as had exposure to mom who tested positive for COVID19. Pt took negative home test today.  Taking scheduled tylenol and ibuprofen - temp was still a little over 100 yesterday (still had some ibuprofen on board). Ongoing nausea and not sure if due to taking NSAIDs and not eating much vs due to mono/inguinal retroperitoneal lymphadenopathy.     Current Status: feeling better today then yesterday but still not feeling well. Has had two crackers otherwise just fluids. Has been rotating tylenol and ibuprofen and  "is unsure if she can continue to do that or what you recommend. Ibuprofen due at 10am, but didn't take it due to nausea - Last taken 2am. Last took tylenol at 6am. Thus, off anti-pyretics and currently afebrile.  Changed to drinking gatorade instead of just plain water as potassium when discharged was 3.3. Able to tolerate a few saltines, jello and 1.5 bananas.           Current Outpatient Medications   Medication     buPROPion (WELLBUTRIN XL) 150 MG 24 hr tablet     doxycycline hyclate (VIBRAMYCIN) 100 MG capsule     ferrous sulfate (FEROSUL) 325 (65 Fe) MG tablet     fluticasone (FLONASE) 50 MCG/ACT nasal spray     hydrOXYzine (ATARAX) 25 MG tablet     loratadine (CLARITIN) 10 MG tablet     metroNIDAZOLE (FLAGYL) 500 MG tablet     multivitamin (ONE-DAILY) tablet     norethindrone-ethinyl estradiol (MICROGESTIN 1/20) 1-20 MG-MCG tablet     ondansetron (ZOFRAN ODT) 4 MG ODT tab     sertraline (ZOLOFT) 50 MG tablet     Vitamin D, Cholecalciferol, 1000 units TABS     No current facility-administered medications for this visit.        Allergies   Allergen Reactions     Bactrim [Sulfamethoxazole W-Trimethoprim]        Review of Systems   Constitutional, HEENT, cardiovascular, pulmonary, gi and gu systems are negative, except as otherwise noted.      Objective    /64 (BP Location: Right arm, Cuff Size: Adult Regular)   Pulse 83   Temp 97.9  F (36.6  C) (Tympanic)   Ht 1.626 m (5' 4\")   Wt 78.9 kg (174 lb)   LMP  (LMP Unknown)   SpO2 95%   BMI 29.87 kg/m    Body mass index is 29.87 kg/m .  Physical Exam   GENERAL: healthy, alert and no distress  EYES: Eyes grossly normal to inspection, PERRL and conjunctivae and sclerae normal  HENT: ear canals and TM's normal, nose and mouth without ulcers or lesions  NECK: small R posterior chain lymphadenopathy   RESP: lungs clear to auscultation - no rales, rhonchi or wheezes  CV: regular rates and rhythm, no murmur, click or rub and no peripheral edema  ABDOMEN: soft, " nontender, no hepatosplenomegaly, no masses and bowel sounds normal  NEURO: Normal strength and tone, mentation intact and speech normal  LYMPH: no supraclavicular, axillary or epitrochlear adenopathy. Points out about 1cm in size L inguinal lymph node, but is non-tender.     Reviewed ER lab work-up/imaging

## 2022-06-06 ENCOUNTER — MYC MEDICAL ADVICE (OUTPATIENT)
Dept: FAMILY MEDICINE | Facility: CLINIC | Age: 47
End: 2022-06-06

## 2022-06-06 ENCOUNTER — LAB (OUTPATIENT)
Dept: LAB | Facility: CLINIC | Age: 47
End: 2022-06-06
Payer: COMMERCIAL

## 2022-06-06 DIAGNOSIS — R74.8 ELEVATED LIVER ENZYMES: ICD-10-CM

## 2022-06-06 LAB
ALBUMIN SERPL-MCNC: 3.3 G/DL (ref 3.4–5)
ALP SERPL-CCNC: 134 U/L (ref 40–150)
ALT SERPL W P-5'-P-CCNC: 273 U/L (ref 0–50)
ANION GAP SERPL CALCULATED.3IONS-SCNC: 7 MMOL/L (ref 3–14)
AST SERPL W P-5'-P-CCNC: 84 U/L (ref 0–45)
BILIRUB SERPL-MCNC: 0.6 MG/DL (ref 0.2–1.3)
BUN SERPL-MCNC: 15 MG/DL (ref 7–30)
CALCIUM SERPL-MCNC: 8.8 MG/DL (ref 8.5–10.1)
CHLORIDE BLD-SCNC: 104 MMOL/L (ref 94–109)
CO2 SERPL-SCNC: 27 MMOL/L (ref 20–32)
CREAT SERPL-MCNC: 0.76 MG/DL (ref 0.52–1.04)
ERYTHROCYTE [DISTWIDTH] IN BLOOD BY AUTOMATED COUNT: 13.6 % (ref 10–15)
GFR SERPL CREATININE-BSD FRML MDRD: >90 ML/MIN/1.73M2
GLUCOSE BLD-MCNC: 155 MG/DL (ref 70–99)
HCT VFR BLD AUTO: 39.5 % (ref 35–47)
HGB BLD-MCNC: 12.6 G/DL (ref 11.7–15.7)
MCH RBC QN AUTO: 29.2 PG (ref 26.5–33)
MCHC RBC AUTO-ENTMCNC: 31.9 G/DL (ref 31.5–36.5)
MCV RBC AUTO: 91 FL (ref 78–100)
PLATELET # BLD AUTO: 375 10E3/UL (ref 150–450)
POTASSIUM BLD-SCNC: 3.9 MMOL/L (ref 3.4–5.3)
PROT SERPL-MCNC: 7.6 G/DL (ref 6.8–8.8)
RBC # BLD AUTO: 4.32 10E6/UL (ref 3.8–5.2)
SODIUM SERPL-SCNC: 138 MMOL/L (ref 133–144)
WBC # BLD AUTO: 8.5 10E3/UL (ref 4–11)

## 2022-06-06 PROCEDURE — 85014 HEMATOCRIT: CPT

## 2022-06-06 PROCEDURE — 82040 ASSAY OF SERUM ALBUMIN: CPT

## 2022-06-06 PROCEDURE — 36415 COLL VENOUS BLD VENIPUNCTURE: CPT

## 2022-06-06 PROCEDURE — 80053 COMPREHEN METABOLIC PANEL: CPT

## 2022-06-07 NOTE — TELEPHONE ENCOUNTER
Please see my chart message below     Please review and advise     Thank you     Candis Parry RN, BSN  [place] [facility]

## 2022-06-08 NOTE — RESULT ENCOUNTER NOTE
Result(s) was/were reviewed with pt via [employer] on 6/6.  Electronically Signed By: Fide Vera PA-C

## 2022-06-10 ENCOUNTER — OFFICE VISIT (OUTPATIENT)
Dept: FAMILY MEDICINE | Facility: CLINIC | Age: 47
End: 2022-06-10
Payer: COMMERCIAL

## 2022-06-10 VITALS
HEART RATE: 98 BPM | HEIGHT: 64 IN | SYSTOLIC BLOOD PRESSURE: 122 MMHG | WEIGHT: 165 LBS | TEMPERATURE: 97.9 F | DIASTOLIC BLOOD PRESSURE: 84 MMHG | BODY MASS INDEX: 28.17 KG/M2 | OXYGEN SATURATION: 96 %

## 2022-06-10 DIAGNOSIS — R59.0 INGUINAL LYMPHADENOPATHY: Primary | ICD-10-CM

## 2022-06-10 DIAGNOSIS — Z02.89 ENCOUNTER FOR COMPLETION OF FORM WITH PATIENT: ICD-10-CM

## 2022-06-10 DIAGNOSIS — B27.90 MONOSPOT TEST POSITIVE: ICD-10-CM

## 2022-06-10 PROCEDURE — 99214 OFFICE O/P EST MOD 30 MIN: CPT | Performed by: PHYSICIAN ASSISTANT

## 2022-06-13 ENCOUNTER — VIRTUAL VISIT (OUTPATIENT)
Dept: FAMILY MEDICINE | Facility: CLINIC | Age: 47
End: 2022-06-13
Payer: COMMERCIAL

## 2022-06-13 ENCOUNTER — LAB (OUTPATIENT)
Dept: LAB | Facility: CLINIC | Age: 47
End: 2022-06-13
Payer: COMMERCIAL

## 2022-06-13 DIAGNOSIS — R19.7 ACUTE DIARRHEA: Primary | ICD-10-CM

## 2022-06-13 DIAGNOSIS — B27.90 MONOSPOT TEST POSITIVE: ICD-10-CM

## 2022-06-13 DIAGNOSIS — R59.0 INGUINAL LYMPHADENOPATHY: ICD-10-CM

## 2022-06-13 DIAGNOSIS — R19.7 ACUTE DIARRHEA: ICD-10-CM

## 2022-06-13 LAB
ALBUMIN SERPL-MCNC: 3.5 G/DL (ref 3.4–5)
ALP SERPL-CCNC: 101 U/L (ref 40–150)
ALT SERPL W P-5'-P-CCNC: 102 U/L (ref 0–50)
ANION GAP SERPL CALCULATED.3IONS-SCNC: 4 MMOL/L (ref 3–14)
AST SERPL W P-5'-P-CCNC: 45 U/L (ref 0–45)
BILIRUB SERPL-MCNC: 0.5 MG/DL (ref 0.2–1.3)
BUN SERPL-MCNC: 10 MG/DL (ref 7–30)
CALCIUM SERPL-MCNC: 9 MG/DL (ref 8.5–10.1)
CHLORIDE BLD-SCNC: 109 MMOL/L (ref 94–109)
CO2 SERPL-SCNC: 23 MMOL/L (ref 20–32)
CREAT SERPL-MCNC: 0.86 MG/DL (ref 0.52–1.04)
ERYTHROCYTE [DISTWIDTH] IN BLOOD BY AUTOMATED COUNT: 13.6 % (ref 10–15)
GFR SERPL CREATININE-BSD FRML MDRD: 84 ML/MIN/1.73M2
GLUCOSE BLD-MCNC: 92 MG/DL (ref 70–99)
HCT VFR BLD AUTO: 42.9 % (ref 35–47)
HGB BLD-MCNC: 13.6 G/DL (ref 11.7–15.7)
MCH RBC QN AUTO: 28.9 PG (ref 26.5–33)
MCHC RBC AUTO-ENTMCNC: 31.7 G/DL (ref 31.5–36.5)
MCV RBC AUTO: 91 FL (ref 78–100)
PLATELET # BLD AUTO: 361 10E3/UL (ref 150–450)
POTASSIUM BLD-SCNC: 3.9 MMOL/L (ref 3.4–5.3)
PROT SERPL-MCNC: 8 G/DL (ref 6.8–8.8)
RBC # BLD AUTO: 4.71 10E6/UL (ref 3.8–5.2)
SODIUM SERPL-SCNC: 136 MMOL/L (ref 133–144)
WBC # BLD AUTO: 6.6 10E3/UL (ref 4–11)

## 2022-06-13 PROCEDURE — 99213 OFFICE O/P EST LOW 20 MIN: CPT | Mod: 95 | Performed by: PHYSICIAN ASSISTANT

## 2022-06-13 PROCEDURE — 36415 COLL VENOUS BLD VENIPUNCTURE: CPT

## 2022-06-13 PROCEDURE — 82040 ASSAY OF SERUM ALBUMIN: CPT

## 2022-06-13 PROCEDURE — 85027 COMPLETE CBC AUTOMATED: CPT

## 2022-06-13 PROCEDURE — 80053 COMPREHEN METABOLIC PANEL: CPT

## 2022-06-13 NOTE — PROGRESS NOTES
"Zohreh is a 47 year old who is being evaluated via a billable video visit.      How would you like to obtain your AVS? MyChart  If the video visit is dropped, the invitation should be resent by: [phone]  Will anyone else be joining your video visit? No    Video Start Time: Start: 06/13/2022 10:59 am  Stop: 06/13/2022 11:16 am    Assessment & Plan     Laure was seen today for gastrointestinal problem.    Diagnoses and all orders for this visit:    Acute diarrhea  -     Clostridium difficile Toxin B PCR; Future  -     Enteric Bacteria and Virus Panel by [facility] Stool; Future  -     CBC with platelets; Future    ? If viral or bacterial based off onset after eating out. Has risk factors for c diff though with recent abx therapy and multiple ER visits. Advise additional labs for stool culture and c diff testing. Notify of results when available. Extend [place] paperwork from mono pending results. Patient in agreement with plan.     Return today (on 6/13/2022) for Lab Work.    Fide Vera PA-C  [facility]   Zohreh is a 47 year old who presents for the following health issues     History of Present Illness       Headaches:   Since the patient's last clinic visit, headaches are: improved  The patient is getting headaches:  Every day  She is able to do normal daily activities when she has a migraine.  The patient is taking the following rescue/relief medications:  Ibuprofen (Advil, Motrin)   Patient states \"The relief is inconsistent\" from the rescue/relief medications.   The patient is taking the following medications to prevent migraines:  No medications to prevent migraines  In the past 4 weeks, the patient has gone to an Urgent Care or Emergency Room 3 or more times times due to headaches.    Reason for visit:  Mono follow up    She eats 2-3 servings of fruits and vegetables daily.She consumes 0 sweetened beverage(s) daily.She exercises with enough effort to increase her heart " rate 9 or less minutes per day.  She exercises with enough effort to increase her heart rate 3 or less days per week.   She is taking medications regularly.     Thursday had diarrhea not feeling well. Friday felt better so attributed to going out to eat. As soon as woke-up Saturday morning had more abdominal cramping, soreness, watery, yellow diarrhea all weekend until last night at 11pm until last night was first time without out. Did ok overnight, but then this morning had diarrhea again. Feels better today than she did on Sat/Sun. Able to eat eggs/kee so far ok. Estimates 8x per day. Staying well-hydrated.  Has never had well tested and planning to do so just for completeness sake.   Weighed self this weekend - down 6 lbs, was 166 last week on clinic scale and 161 on her home scale.       Current Outpatient Medications   Medication     buPROPion (WELLBUTRIN XL) 150 MG 24 hr tablet     ferrous sulfate (FEROSUL) 325 (65 Fe) MG tablet     fluticasone (FLONASE) 50 MCG/ACT nasal spray     hydrOXYzine (ATARAX) 25 MG tablet     loratadine (CLARITIN) 10 MG tablet     multivitamin (ONE-DAILY) tablet     norethindrone-ethinyl estradiol (MICROGESTIN 1/20) 1-20 MG-MCG tablet     sertraline (ZOLOFT) 50 MG tablet     Vitamin D, Cholecalciferol, 1000 units TABS     No current facility-administered medications for this visit.        Allergies   Allergen Reactions     Bactrim [Sulfamethoxazole W-Trimethoprim]          Review of Systems   Constitutional, HEENT, cardiovascular, pulmonary, gi and gu systems are negative, except as otherwise noted.      Objective           Vitals:  No vitals were obtained today due to virtual visit.    Physical Exam   GENERAL: Healthy, alert and no distress  EYES: Eyes grossly normal to inspection.  No discharge or erythema, or obvious scleral/conjunctival abnormalities.  RESP: No audible wheeze, cough, or visible cyanosis.  No visible retractions or increased work of breathing.    SKIN: Visible  skin clear. No significant rash, abnormal pigmentation or lesions.  NEURO: Cranial nerves grossly intact.  Mentation and speech appropriate for age.  PSYCH: Mentation appears normal, affect normal/bright, judgement and insight intact, normal speech and appearance well-groomed.                Video-Visit Details    Type of service:  Video Visit    Video End Time:Start: 06/13/2022 10:59 am  Stop: 06/13/2022 11:16 am    Originating Location (pt. Location): Home    Distant Location (provider location):  [facility]     Platform used for Video Visit: [employer]

## 2022-06-15 ENCOUNTER — TELEPHONE (OUTPATIENT)
Dept: FAMILY MEDICINE | Facility: CLINIC | Age: 47
End: 2022-06-15
Payer: COMMERCIAL

## 2022-06-15 ENCOUNTER — TELEPHONE (OUTPATIENT)
Dept: FAMILY MEDICINE | Facility: CLINIC | Age: 47
End: 2022-06-15

## 2022-06-15 NOTE — TELEPHONE ENCOUNTER
Spoke to pt. Reports she is feeling better and diarrhea has resolved as of yesterday morning. Normal stool today. Needs updated return to work note as feels she can return tomorrow with previous updated restrictions. Updated work letter completed, please fax for her. Placed in [facility].  Electronically Signed By: Fide Vera PA-C

## 2022-06-15 NOTE — RESULT ENCOUNTER NOTE
Dear Zohreh,      Your recent test results are noted below:    So far things are looking good from a liver standpoint - AST back to normal and ALT down to 102. Ok to just repeat with oncology next time as planned. CBC continues to be normal. Will await your stool studies once processed.     For additional lab test information, labtestsonline.org is an excellent reference. Please contact the clinic at [phone] with any further questions or concerns.    Sincerely,      Fide Vera PA-C  [facility]

## 2022-06-15 NOTE — TELEPHONE ENCOUNTER
Reason for Call:  Form, our goal is to have forms completed with 72 hours, however, some forms may require a visit or additional information.    Type of letter, form or note:  Workability     Who is the form from?: [facility] [employer]  (if other please explain)    Where did the form come from: form was faxed in    What clinic location was the form placed at?: [facility]    Where the form was placed: Given to physician    What number is listed as a contact on the form?: [phone]       Additional comments:     Call taken on 6/15/2022 at 4:31 PM by Patrica Ayers

## 2022-06-15 NOTE — TELEPHONE ENCOUNTER
I also called and LM for Heather Behr, absence management office, regarding my question below. Asked for return call to verify.  Electronically Signed By: Fide Vera PA-C

## 2022-06-15 NOTE — LETTER
[address] [address]  Phone: [phone]    Alannah 15, 2022        Laure [place]  [address] [place] [address]          To whom it may concern:    RE: Laure Mendez    This serves as an updated workability letter to the previous provided. The only change is a new return to work date effective: 6/16/2022. The previous work restrictions continue to apply.    Please contact me for questions or concerns.      Sincerely,        Fide Vera PA-C

## 2022-06-15 NOTE — TELEPHONE ENCOUNTER
Patient needs a return to work clearance letter.   Says she is feeling better and could go back starting tomorrow     Please call patient once written       Natalie Ayers

## 2022-06-15 NOTE — TELEPHONE ENCOUNTER
I wrote a letter to addend previous workability letter that should have been faxed earlier today. Can we verify they received this and if they need to have the [facility] specific workability form re-done? Thanks,  Electronically Signed By: Fide Vera PA-C

## 2022-06-16 NOTE — TELEPHONE ENCOUNTER
Please review and advise MyChart message    Per PL [facility] lab staff soft/formed stool would not be tested for cdiff.     Thank you!  Concepción CHU RN   [facility] Triage

## 2022-06-16 NOTE — TELEPHONE ENCOUNTER
Form came in after letter had been faxed over so I assumed the letter is what set up the action to send the form       Natalie Ayers

## 2022-06-16 NOTE — TELEPHONE ENCOUNTER
Fide, does the form need to go to medical records for records to be sent?     Unsure what message means ( sorry)       Natalie Ayers

## 2022-06-16 NOTE — TELEPHONE ENCOUNTER
Forms repeated and faxed. Please send copy to scanning as faxed to PL for copy for her records as well.  Electronically Signed By: Fide Vera PA-C

## 2022-06-23 NOTE — TELEPHONE ENCOUNTER
Willian Estrada,  I had just faxed an extra one to the clinic so that we could include it for her records. I believe Doreen received it so double check with her. I had faxed the original already directly from my fax so was just for her records.  Thanks,  Electronically Signed By: Fide Vera PA-C

## 2022-09-06 ENCOUNTER — HOSPITAL ENCOUNTER (OUTPATIENT)
Dept: CT IMAGING | Facility: CLINIC | Age: 47
Discharge: HOME | End: 2022-09-06
Attending: INTERNAL MEDICINE | Admitting: INTERNAL MEDICINE
Payer: COMMERCIAL

## 2022-09-06 ENCOUNTER — TELEPHONE (OUTPATIENT)
Dept: GASTROENTEROLOGY | Facility: CLINIC | Age: 47
End: 2022-09-06

## 2022-09-06 DIAGNOSIS — R59.0 ENLARGED SUBMENTAL LYMPH NODE: ICD-10-CM

## 2022-09-06 DIAGNOSIS — Z12.11 SPECIAL SCREENING FOR MALIGNANT NEOPLASMS, COLON: ICD-10-CM

## 2022-09-06 PROCEDURE — 250N000009 HC RX 250: Performed by: RADIOLOGY

## 2022-09-06 PROCEDURE — 74177 CT ABD & PELVIS W/CONTRAST: CPT

## 2022-09-06 PROCEDURE — 250N000011 HC RX IP 250 OP 636: Performed by: RADIOLOGY

## 2022-09-06 RX ORDER — IOPAMIDOL 755 MG/ML
500 INJECTION, SOLUTION INTRAVASCULAR ONCE
Status: COMPLETED | OUTPATIENT
Start: 2022-09-06 | End: 2022-09-06

## 2022-09-06 RX ORDER — BISACODYL 5 MG
TABLET, DELAYED RELEASE (ENTERIC COATED) ORAL
Qty: 4 TABLET | Refills: 0 | Status: SHIPPED | OUTPATIENT
Start: 2022-09-06 | End: 2022-12-23

## 2022-09-06 RX ADMIN — IOPAMIDOL 75 ML: 755 INJECTION, SOLUTION INTRAVENOUS at 08:47

## 2022-09-06 RX ADMIN — SODIUM CHLORIDE 65 ML: 9 INJECTION, SOLUTION INTRAVENOUS at 08:47

## 2022-09-06 NOTE — TELEPHONE ENCOUNTER
Screening Questions    BlueKINDY OF PREP RedLOCATION [review exclusion criteria] GreenSEDATION TYPE      1. Are you active on mychart? Y    2. What insurance is in the chart? PREF ONE     3.   Ordering/Referring Provider: Fide Vera PA-C    4. BMI   (If greater than 40 review exclusion criteria [PAC APPT IF [MAC] @ [facility])  28.3  [If yes, BMI OVER 40-EXTENDED PREP]      **(Sedation review/consideration needed)**  Do you have a legal guardian or Medical Power of    and/or are you able to give consent for your medical care?     Y    5. Have you had a positive covid test in the last 90 days?   N -     6.  Are you currently on dialysis?   N [ If yes, G-PREP & HOSPITAL setting ONLY]     7.  Do you have chronic kidney disease?  N [ If yes, G-PREP ]    8.   Do you have a diagnosis of diabetes?   N   [ If yes, G-PREP ]    9.  On a regular basis do you go 3-5 days between bowel movements?   N   [ If yes, EXTENDED PREP]    10.  Are you taking any prescription pain medications on a routine schedule?    N -  [ If yes, EXTENDED PREP] [If yes, MAC]      11.   Do you have any chemical dependencies such as alcohol, street drugs, or methadone?    N [If yes, MAC]    12.   Do you have any history of post-traumatic stress syndrome, severe anxiety or history of psychosis?    N  [If yes, MAC]    13.  [FEMALES] Are you currently pregnant? N    If yes, how many weeks?       Respiratory/Heart Screening:  [If yes to any of the following HOSPITAL setting only]     14. Do you have Pulmonary Hypertension [Lungs]?   N       15. Do you have UNCONTROLLED asthma?   N     16.  Do you use daily home oxygen?  N      17. Do you have mod to severe Obstructive Sleep Apnea?         (OKAY @  [facility]  SH  PH  RI  MG - if pt is not on OXYGEN)  N      18.   Have you had a heart or lung transplant?   N      19.   Have you had a stroke or Transient ischemic attack (TIA - aka  mini stroke ) within 6 months?  (If yes, please review  exclusion criteria)  N     20.   In the past 6 months, have you had any heart related issues including cardiomyopathy or heart attack?   N           If yes, did it require cardiac stenting or other implantable device?   NA      21.   Do you have any implantable devices in your body (pacemaker, defib, LVAD)? (If yes, please review exclusion criteria)  N     22.  Do you take the medication Phentermine?  NO        23. Do you take nitroglycerin?   N           If yes, how often? NA  (if yes, HOSPITAL setting ONLY)    24.  Are you currently taking any blood thinners?    [If yes, INFORM patient to follw  w/ ORDERING PROVIDER FOR BRIDGING INSTRUCTIONS]     Y    25.   Do you transfer independently?                (If NO, please HOSPITAL setting ONLY)  Y    26.   Preferred LOCAL Pharmacy for Pre Prescription:        [facility] [employer]    Scheduling Details  (Please ask for phone number if not scheduled by patient)      Caller : Laure Mendez    Date of Procedure: 9/19  Surgeon: SHANNON  Location:         Sedation Type: CS l PER PROTOCOL  Conscious Sedation- Needs  for 6 hours after the procedure  MAC/General-Needs  for 24 hours after procedure    N :[Pre-op Required] at [facility]  SH  MG and OR for MAC sedation   (advise patient they will need a pre-op WITH IN 30 DAYS of procedure date)     Type of Procedure Scheduled:   Lower Endoscopy [Colonoscopy]    Which Colonoscopy Prep was Sent?:   GOLYTELY - MAG CITRATE RECALL    KHORUTS CF PATIENTS & GROEN'S PATIENTS NEEDS EXTENDED PREP       Informed patient they will need an adult  Y  Cannot take any type of public or medical transportation alone    Pre-Procedure Covid test to be completed at [facility] or Externally: Y  **INFORMED OF HOME TESTING & LAB OPTION**        Confirmed Nurse will call to complete assessment Y    Additional comments:

## 2022-09-19 ENCOUNTER — HOSPITAL ENCOUNTER (OUTPATIENT)
Facility: CLINIC | Age: 47
Discharge: HOME | End: 2022-09-19
Attending: INTERNAL MEDICINE | Admitting: INTERNAL MEDICINE
Payer: COMMERCIAL

## 2022-09-19 VITALS
HEART RATE: 58 BPM | OXYGEN SATURATION: 99 % | WEIGHT: 175 LBS | TEMPERATURE: 97.1 F | HEIGHT: 66 IN | DIASTOLIC BLOOD PRESSURE: 69 MMHG | RESPIRATION RATE: 16 BRPM | BODY MASS INDEX: 28.12 KG/M2 | SYSTOLIC BLOOD PRESSURE: 121 MMHG

## 2022-09-19 DIAGNOSIS — Z12.11 SPECIAL SCREENING FOR MALIGNANT NEOPLASMS, COLON: Primary | ICD-10-CM

## 2022-09-19 LAB — COLONOSCOPY: NORMAL

## 2022-09-19 PROCEDURE — G0121 COLON CA SCRN NOT HI RSK IND: HCPCS | Performed by: INTERNAL MEDICINE

## 2022-09-19 PROCEDURE — 250N000011 HC RX IP 250 OP 636: Performed by: INTERNAL MEDICINE

## 2022-09-19 PROCEDURE — 250N000013 HC RX MED GY IP 250 OP 250 PS 637: Performed by: INTERNAL MEDICINE

## 2022-09-19 PROCEDURE — 45378 DIAGNOSTIC COLONOSCOPY: CPT | Performed by: INTERNAL MEDICINE

## 2022-09-19 PROCEDURE — 99153 MOD SED SAME PHYS/QHP EA: CPT | Performed by: INTERNAL MEDICINE

## 2022-09-19 PROCEDURE — G0500 MOD SEDAT ENDO SERVICE >5YRS: HCPCS | Performed by: INTERNAL MEDICINE

## 2022-09-19 RX ORDER — FLUMAZENIL 0.1 MG/ML
0.2 INJECTION, SOLUTION INTRAVENOUS
Status: DISCONTINUED | OUTPATIENT
Start: 2022-09-19 | End: 2022-09-19 | Stop reason: HOSPADM

## 2022-09-19 RX ORDER — NALOXONE HYDROCHLORIDE 0.4 MG/ML
0.4 INJECTION, SOLUTION INTRAMUSCULAR; INTRAVENOUS; SUBCUTANEOUS
Status: DISCONTINUED | OUTPATIENT
Start: 2022-09-19 | End: 2022-09-19 | Stop reason: HOSPADM

## 2022-09-19 RX ORDER — LIDOCAINE 40 MG/G
CREAM TOPICAL
Status: DISCONTINUED | OUTPATIENT
Start: 2022-09-19 | End: 2022-09-19 | Stop reason: HOSPADM

## 2022-09-19 RX ORDER — NALOXONE HYDROCHLORIDE 0.4 MG/ML
0.2 INJECTION, SOLUTION INTRAMUSCULAR; INTRAVENOUS; SUBCUTANEOUS
Status: DISCONTINUED | OUTPATIENT
Start: 2022-09-19 | End: 2022-09-19 | Stop reason: HOSPADM

## 2022-09-19 RX ORDER — ONDANSETRON 2 MG/ML
4 INJECTION INTRAMUSCULAR; INTRAVENOUS EVERY 6 HOURS PRN
Status: DISCONTINUED | OUTPATIENT
Start: 2022-09-19 | End: 2022-09-19 | Stop reason: HOSPADM

## 2022-09-19 RX ORDER — FENTANYL CITRATE 0.05 MG/ML
50-100 INJECTION, SOLUTION INTRAMUSCULAR; INTRAVENOUS EVERY 5 MIN PRN
Status: DISCONTINUED | OUTPATIENT
Start: 2022-09-19 | End: 2022-09-19 | Stop reason: HOSPADM

## 2022-09-19 RX ORDER — EPINEPHRINE 1 MG/ML
0.1 INJECTION, SOLUTION INTRAMUSCULAR; SUBCUTANEOUS
Status: DISCONTINUED | OUTPATIENT
Start: 2022-09-19 | End: 2022-09-19 | Stop reason: HOSPADM

## 2022-09-19 RX ORDER — DIPHENHYDRAMINE HYDROCHLORIDE 50 MG/ML
25-50 INJECTION INTRAMUSCULAR; INTRAVENOUS
Status: DISCONTINUED | OUTPATIENT
Start: 2022-09-19 | End: 2022-09-19 | Stop reason: HOSPADM

## 2022-09-19 RX ORDER — ATROPINE SULFATE 0.1 MG/ML
1 INJECTION INTRAVENOUS
Status: DISCONTINUED | OUTPATIENT
Start: 2022-09-19 | End: 2022-09-19 | Stop reason: HOSPADM

## 2022-09-19 RX ORDER — PROCHLORPERAZINE MALEATE 10 MG
10 TABLET ORAL EVERY 6 HOURS PRN
Status: DISCONTINUED | OUTPATIENT
Start: 2022-09-19 | End: 2022-09-19 | Stop reason: HOSPADM

## 2022-09-19 RX ORDER — SIMETHICONE 40MG/0.6ML
133 SUSPENSION, DROPS(FINAL DOSAGE FORM)(ML) ORAL
Status: COMPLETED | OUTPATIENT
Start: 2022-09-19 | End: 2022-09-19

## 2022-09-19 RX ORDER — ONDANSETRON 4 MG/1
4 TABLET, ORALLY DISINTEGRATING ORAL EVERY 6 HOURS PRN
Status: DISCONTINUED | OUTPATIENT
Start: 2022-09-19 | End: 2022-09-19 | Stop reason: HOSPADM

## 2022-09-19 RX ORDER — ONDANSETRON 2 MG/ML
4 INJECTION INTRAMUSCULAR; INTRAVENOUS
Status: DISCONTINUED | OUTPATIENT
Start: 2022-09-19 | End: 2022-09-19 | Stop reason: HOSPADM

## 2022-09-19 RX ADMIN — MIDAZOLAM HYDROCHLORIDE 2 MG: 1 INJECTION, SOLUTION INTRAMUSCULAR; INTRAVENOUS at 08:22

## 2022-09-19 RX ADMIN — SIMETHICONE 133 MG: 20 EMULSION ORAL at 08:30

## 2022-09-19 RX ADMIN — FENTANYL CITRATE 100 MCG: 50 INJECTION INTRAMUSCULAR; INTRAVENOUS at 08:14

## 2022-09-19 RX ADMIN — MIDAZOLAM HYDROCHLORIDE 2 MG: 1 INJECTION, SOLUTION INTRAMUSCULAR; INTRAVENOUS at 08:14

## 2022-09-19 ASSESSMENT — ACTIVITIES OF DAILY LIVING (ADL): ADLS_ACUITY_SCORE: 35

## 2022-09-19 NOTE — DISCHARGE INSTRUCTIONS
The patient has received a copy of the Provation  report the doctor has written and discharge instructions have been discussed with the patient and responsible adult.  All questions were addressed and answered prior to patient discharge.       Understanding Diverticulosis and Diverticulitis     Pouches or diverticula usually occur in the lower part of the colon called the sigmoid.      Diverticulitis occurs when the pouches become inflamed.     The colon (large intestine) is the last part of the digestive tract. It absorbs water from stool and changes it from a liquid to a solid. In certain cases, small pouches called diverticula can form in the colon wall. This condition is called diverticulosis. The pouches can become infected. If this happens, it becomes a more serious problem called diverticulitis. These problems can be painful. But they can be managed.   Managing Your Condition  Diet changes or taking medications are often tried first. These may be enough to bring relief. If the case is bad, surgery may be done. You and your doctor can discuss the plan that is best for you.  If You Have Diverticulosis  Diet changes are often enough to control symptoms. The main changes are adding fiber (roughage) and drinking more water. Fiber absorbs water as it travels through your colon. This helps your stool stay soft and move smoothly. Water helps this process. If needed, you may be told to take over-the-counter stool softeners. To help relieve pain, antispasmodic medications may be prescribed.  If You Have Diverticulitis  Treatment depends on how bad your symptoms are.  For mild symptoms: You may be put on a liquid diet for a short time. You may also be prescribed antibiotics. If these two steps relieve your symptoms, you may then be prescribed a high-fiber diet. If you still have symptoms, your doctor will discuss further treatment options with you.  For severe symptoms: You may need to be admitted to the hospital.  There, you can be given IV antibiotics and fluids. Once symptoms are under control, the above treatments may be tried. If these don t control your condition, your doctor may discuss the option of having surgery with you.  Gary to Colon Health  Help keep your colon healthy with a diet that includes plenty of high-fiber fruits, vegetables, and whole grains. Drink plenty of liquids like water and juice. Your doctor may also recommend avoiding seeds and nuts.          [id number] [facility], [address] [place] [address], [place] [address]. All rights reserved. This information is not intended as a substitute for professional medical care. Always follow your healthcare professional's instructions.       Eating a High-Fiber Diet  Fiber is what gives strength and structure to plants. Most grains, beans, vegetables, and fruits contain fiber. Foods rich in fiber are often low in calories and fat, and they fill you up more. They may also reduce your risks for certain health problems. To find out the amount of fiber in canned, packaged, or frozen foods, read the  Nutrition Facts  label. It tells you how much fiber is in a serving.      Types of Fiber and Their Benefits  There are two types of fiber: insoluble and soluble. They both aid digestion and help you maintain a healthy weight.  Insoluble fiber: This is found in whole grains, cereals, certain fruits and vegetables (such as apple skin, corn, and carrots). Insoluble fiber may prevent constipation and reduce the risk of certain types of cancer.   Soluble fiber: This type of fiber is in oats, beans, and certain fruits and vegetables (such as strawberries and peas). Soluble fiber can reduce cholesterol (which may help lower the risk of heart disease), and helps control blood sugar levels.  Look for High-Fiber Foods  Whole-grain breads and cereals: Try to eat 6-8 ounces a day. Include wheat and oat bran cereals, whole-wheat muffins or toast, and corn tortillas in your  meals.  Fruits: Try to eat 2 cups a day. Apples, oranges, strawberries, pears, and bananas are good sources. (Note: Fruit juice is low in fiber.)  Vegetables: Try to eat 3 cups a day. Add asparagus, carrots, broccoli, peas, and corn to your meals.  Legumes (beans): One cup of cooked lentils gives you over 15 grams of fiber. Try navy beans, lentils, and chickpeas.  Seeds:  A small handful of seeds gives you about 3 grams of fiber. Try sunflower seeds.    Keep Track of Your Fiber  A healthy diet includes 31 grams of fiber a day if you have a 2,000-calorie diet. Keep track of how much fiber you eat. Start by reading food labels. Then eat a variety of foods high in fiber. Ask your doctor about supplemental fiber products.            [id number] [address], [place] [address]. All rights reserved. This information is not intended as a substitute for professional medical care. Always follow your healthcare professional's instructions.

## 2022-09-19 NOTE — H&P
"[facility] Anesthesia Pre-op History and Physical    Laure Mendez MRN# [id number]   Age: 47 year old YOB: 1975      Date of Surgery: today [facility]      Date of Exam 9/19/2022 Facility (Same day)       Home clinic: unknown  Primary care provider: Fide Vera         Chief Complaint and/or Reason for Procedure:   No chief complaint on file.           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 Grew-up along [facility] in [place], [place] - risk factor fo histoplasmosis infection 10/07/2021     Priority: Medium     Bilateral carpal tunnel syndrome - s/p release on R. now pursuing L 11/01/2019     Priority: Medium     Major depression in complete remission (H) 09/09/2019     Priority: Medium     Elevated LDL cholesterol level 07/18/2018     Priority: Medium     Chronic non-seasonal allergic rhinitis, unspecified trigger 07/18/2018     Priority: Medium     Overweight (BMI 25.0-29.9) 07/18/2018     Priority: Medium     Anxiety 07/01/2011     Priority: Medium     CARDIOVASCULAR SCREENING; LDL GOAL LESS THAN 160 05/26/2011     Priority: Medium            Medications (include herbals and vitamins):   Any Plavix use in the last 7 days? No     Current Facility-Administered Medications   Medication     0.9% sodium chloride BOLUS     atropine injection 1 mg     benzocaine 20% (HURRICAINE/TOPEX) 20 % spray 0.5 mL     diphenhydrAMINE (BENADRYL) injection 25-50 mg     EPINEPHrine (Anaphylaxis) (ADRENALIN) injection (vial) 0.1 mg     fentaNYL (PF) (SUBLIMAZE) injection  mcg     flumazenil (ROMAZICON) injection 0.2 mg     glucagon injection 0.5 mg     midazolam (VERSED) injection 0.5-2 mg     " "naloxone (NARCAN) injection 0.2 mg    Or     naloxone (NARCAN) injection 0.4 mg    Or     naloxone (NARCAN) injection 0.2 mg    Or     naloxone (NARCAN) injection 0.4 mg     simethicone (MYLICON) suspension 133 mg     sodium chloride (PF) 0.9% PF flush 3 mL             Allergies:      Allergies   Allergen Reactions     Bactrim [Sulfamethoxazole W-Trimethoprim]      Allergy to Latex? No  Allergy to tape?   No  Intolerances: bactrim            Physical Exam:   All vitals have been reviewed  Patient Vitals for the past 8 hrs:   BP Temp Temp src Pulse Resp SpO2 Height Weight   09/19/22 0740 -- 97.1  F (36.2  C) Temporal -- -- -- 1.676 m (5' 6\") 79.4 kg (175 lb)   09/19/22 0735 119/77 -- -- 97 12 97 % -- --     No intake/output data recorded.  Airway assessment:   Patient is able to open mouth wide  Patient is able to stick out tongue}              Lab / Radiology Results:             Anesthetic risk and/or ASA classification:       Dennise Orozco MD     "

## 2022-09-19 NOTE — LETTER
[facility] ENDOSCOPY [place]    [address] [place] [address]  Phone: [phone]  Fax: [phone]           September 6, 2022                      Laure Mendez  [address] [place] [address]      Dear Laure Mendez,        Thank you for choosing [facility]. You are scheduled for the following service(s).   Please be aware that coverage of these services is subject to the terms and limitations of your health insurance plan.  Call member services at your health plan with any benefit or coverage questions.    Scheduled Endoscopy Procedure(s): Colonoscopy     Date: September 19, 2022  Scheduled MD: Dr Dennise Orozco          Arrival Time:   7:15am  *Enter and check in at the Main Hospital Entrance  Procedure Time:  8:00am       Location:   [facility]        Endoscopy Department, First Floor *         [address] [place], [place] [address] [phone] () or [phone] to reschedule                      STANDARD Golytely (Colyte, Nulytely)  Prep Instructions for your Colonoscopy  Please read these instructions carefully at least 7 days prior to your colonoscopy procedure. Be sure to follow all directions completely. The inside of your colon must be clean to allow for a complete examination for the presence of any growths, polyps, and/or abnormalities, as well as their biopsy or removal. A number of tips are included in order to make this part of the procedure as comfortable as possible.    Getting ready:     A nurse will call you approximately 1 week before your exam to prescribe your bowel prep and review the prep instructions with you.    You must arrange for an adult to drive you home after your exam. Your colonoscopy cannot be done unless you have a ride. If you need to use public transportation, someone must ride with you and stay with you for a minimum of 6-24 hours.    Check with your insurance  company to be sure they will cover this exam.    7 days before the exam:    Talk to your doctor:  If you take blood-thinners (such as Coumadin, Plavix, Xarelto), your prescription or schedule may need to change before the test.    Stop taking fiber supplements, multi-vitamins with iron, and medicines that contain iron.    Continue taking prescribed aspirin; talk to your prescribing doctor with any concerns.    Stop eating corn, nuts and foods with seeds.  These can stay in the colon for days.    If you have diabetes:  Ask to have your exam early in the morning.  Also, ask your doctor if you should change your diet or medicines.    3 days before the exam:    Begin a low-fiber diet: No raw fruits or vegetables, whole wheat, seeds, nuts, popcorn or other high-fiber foods (see list on page). No binding agents: (bran, Metamucil, Fibercon) and no Olestra (a fat substitute).    One day before the exam:    You can have a light, low-fiber breakfast. But drink only clear liquids after 9 a.m. (see list below). Drink at least 8 to 10 full glasses of clear liquids during the day.     Fill the jug that contains the Golytely powder with warm water. Cover and shake until well mixed. Use a full gallon of water. Chill for 3 hours, but do not add ice.    You will start drinking half of the Golytely solution at 6 p.m. You should drink the other half about 6 hours before your exam. So, the timing of Step 2 will depend on your exam time.     After you start drinking the solution, stay near a toilet. You may have watery stools (diarrhea), mild cramping, bloating , and nausea.     Step One:  o At 3 p.m., take 2 tablets of Dulcolax (bisacodyl).  o At 6 p.m. start drinking the Golytely solution. Drink an 8-ounce glass every 15 minutes until the jug is half empty. Drink each glass quickly.                     Step Two:   If you arrive before 11 AM:  At 11 p.m. on the day before your exam:    Take 2 Dulcolax (Bisacodyl) tablets.     Start  drinking the other half of the Golytely jug. Drink one 8-ounce glass every 15 minutes until the jug is empty. Drink each glass quickly.  If you arrive after 11 AM:  At 6 AM on the day of the exam:    Take 2 tablets of Dulcolax (Bisacodyl).     Drink the other half of the Golytyel jug.     Drink one 8-ounce glass every 15 minutes until the jug is empty.     Drink each glass quickly.     You should finish the prep 4 hours before the exam.      Day of exam:      You may drink clear liquids only up until 4 hours before your exam.    Do not drink anything 4 hours before your exam, not even water. (If you must take medicine, you can take it with a sip of water.)     Do not chew or swallow anything including water or gum for at least 4 hours before your exam. If you do, we may cancel the exam for your safety.     Do not take diabetes medicine by mouth until after your exam.    If you have asthma, bring your inhaler.    Arrive with an adult who will take you home after your test. The medicine used will make you sleepy. If you don't have someone to take you home, we will cancel your test.       CLEAR LIQUIDS   You may have:    Water, tea, coffee (no milk or cream)    Soda pop, Gatorade (not red or purple)    Jell-O, Popsicles (no milk or fruit pieces - not red or purple)    Fat-free soup broth or bouillon    Plain hard candy, such as clear life savers (not red or purple)    Clear juices and fruit-flavored drinks, such as apple juice, white grape juice, Hi-C, and Miugel-Aid (not red or purple)   Do not have:    Milk or milk products such as ice cream, malts or shakes, or coffee creamer    Red or purple drinks of any kind such as cranberry juice or grape juice. Avoid red or purple Jell-O, Popsicles, Miguel-Aid, sorbet, sherbet and candy    Juices with pulp such as orange, grapefruit, pineapple or tomato juice    Cream soups of any kind    Alcohol and beer    Protein drinks or protein powder     LOW FIBER DIET   You may  have:      Starches: White bread, rolls, biscuits, croissants, [place] toast, white flour tortillas, waffles, pancakes, Romanian toast; white rice, noodles, pasta, macaroni; cooked and peeled potatoes; plain crackers, saltines; cooked farina or cream of rice; puffed rice, corn flakes, Rice Krispies, Special K     Vegetables: tender cooked and canned, vegetable broths    Fruits and fruit juices: Strained fruit juice, canned fruit without seeds or skin (not pineapple), applesauce, pear sauce, ripe bananas, melons (not watermelon)     Milk products: Milk (plain or flavored), cheese, cottage cheese, yogurt (no berries), custard, ice cream      Proteins: Tender, well-cooked ground beef, lamb, veal, ham, pork, chicken, turkey, fish or organ meats; eggs; creamy peanut butter     Fats and condiments:  Margarine, butter, oils, mayonnaise, sour cream, salad dressing, plain gravy; spices, cooked herbs; sugar, clear jelly, honey, syrup     Snacks, sweets and drinks: Pretzels, hard candy; plain cakes and cookies (no nuts or seeds); gelatin, plain pudding, sherbet, Popsicles; coffee, tea, carbonated ( fizzy ) drinks Do not have:      Starches: Breads or rolls that contain nuts, seeds or fruit; whole wheat or whole grain breads that contain more than 1 gram of fiber per slice; cornbread; corn or whole wheat tortillas; potatoes with skin; brown rice, wild rice, kasha (buckwheat), and oatmeal     Vegetables: Any raw or steamed vegetables; vegetables with seeds; corn in any form     Fruits and fruit juices: Prunes, prune juice, raisins and other dried fruits, berries and other fruits with seeds, canned pineapple juices with pulp such as orange, grapefruit, pineapple or tomato juice    Milk products: Any yogurt with nuts, seeds or berries     Proteins: Tough, fibrous meats with gristle; cooked dried beans, peas or lentils; crunchy peanut butter    Fats and condiments: Pickles, olives, relish, horseradish; jam, marmalade, preserves      Snacks, sweets and drinks: Popcorn, nuts, seeds, granola, coconut, candies made with nuts or seeds; all desserts that contain nuts, seeds, raisins and other dried fruits, coconut, whole grains or bran.        FAQ:      How do you know if your colon is cleaned out?   o After completing the bowel prep, your bowel movements should be all liquid and yellow. Your bowel movements will look similar to urine in the toilet. If there are pieces of stool (poop) in the toilet, or if you can't see to the bottom of the toilet, please call our office for advice. Call [phone] and ask to speak with a nurse.     Why do you need a responsible  to take you home and stay with you?  o We require a responsible adult to take you home for your safety. The sedation medicines used to relax you during the procedure can impair your judgement and reaction time, make you forgetful and possible a little unsteady. Do not drive, make any important decisions, or sign any legal documents for 24 hours after your procedure.     It is normal to feel bloated and gassy after your procedure. Walking will help move the air through your colon. You can take non-aspirin pain relievers that contain acetaminophen (Tylenol).     When can you eat after your procedure?  o You may resume your normal diet when you feel ready, unless advised otherwise by the doctor performing your procedure. Do not drink alcohol for 24 hours after your procedure.     You many resume normal activities (work, exercise, etc.) after 24 hours.     When will you get test results?  o You should have your procedure results and any lab results (if applicable) by letter, MyChart message, or phone call within 2 weeks. If you have any questions, please call the doctor that referred you for the procedure.       Thank you for choosing  [facility], for your procedure. If you are sent a survey regarding your care, please take the time to complete the questionnaire. We value your  feedback!    Updated: 6/22/2022

## 2022-09-21 ENCOUNTER — LAB (OUTPATIENT)
Dept: LAB | Facility: CLINIC | Age: 47
End: 2022-09-21
Payer: COMMERCIAL

## 2022-09-21 DIAGNOSIS — R59.0 INGUINAL LYMPHADENOPATHY: ICD-10-CM

## 2022-09-21 LAB
ALBUMIN SERPL BCG-MCNC: 4.1 G/DL (ref 3.5–5.2)
ALP SERPL-CCNC: 60 U/L (ref 35–104)
ALT SERPL W P-5'-P-CCNC: 19 U/L (ref 10–35)
ANION GAP SERPL CALCULATED.3IONS-SCNC: 10 MMOL/L (ref 7–15)
AST SERPL W P-5'-P-CCNC: 25 U/L (ref 10–35)
BASOPHILS # BLD AUTO: 0 10E3/UL (ref 0–0.2)
BASOPHILS NFR BLD AUTO: 0 %
BILIRUB SERPL-MCNC: 0.3 MG/DL
BUN SERPL-MCNC: 11.9 MG/DL (ref 6–20)
CALCIUM SERPL-MCNC: 8.9 MG/DL (ref 8.6–10)
CHLORIDE SERPL-SCNC: 102 MMOL/L (ref 98–107)
CREAT SERPL-MCNC: 0.79 MG/DL (ref 0.51–0.95)
DEPRECATED HCO3 PLAS-SCNC: 25 MMOL/L (ref 22–29)
EOSINOPHIL # BLD AUTO: 0.1 10E3/UL (ref 0–0.7)
EOSINOPHIL NFR BLD AUTO: 1 %
ERYTHROCYTE [DISTWIDTH] IN BLOOD BY AUTOMATED COUNT: 13.5 % (ref 10–15)
GFR SERPL CREATININE-BSD FRML MDRD: >90 ML/MIN/1.73M2
GLUCOSE SERPL-MCNC: 82 MG/DL (ref 70–99)
HCT VFR BLD AUTO: 38.6 % (ref 35–47)
HGB BLD-MCNC: 12.7 G/DL (ref 11.7–15.7)
IMM GRANULOCYTES # BLD: 0 10E3/UL
IMM GRANULOCYTES NFR BLD: 0 %
LYMPHOCYTES # BLD AUTO: 2.5 10E3/UL (ref 0.8–5.3)
LYMPHOCYTES NFR BLD AUTO: 37 %
MCH RBC QN AUTO: 28.8 PG (ref 26.5–33)
MCHC RBC AUTO-ENTMCNC: 32.9 G/DL (ref 31.5–36.5)
MCV RBC AUTO: 88 FL (ref 78–100)
MONOCYTES # BLD AUTO: 0.7 10E3/UL (ref 0–1.3)
MONOCYTES NFR BLD AUTO: 10 %
NEUTROPHILS # BLD AUTO: 3.5 10E3/UL (ref 1.6–8.3)
NEUTROPHILS NFR BLD AUTO: 52 %
NRBC # BLD AUTO: 0 10E3/UL
NRBC BLD AUTO-RTO: 0 /100
PLATELET # BLD AUTO: 294 10E3/UL (ref 150–450)
POTASSIUM SERPL-SCNC: 3.7 MMOL/L (ref 3.4–5.3)
PROT SERPL-MCNC: 6.9 G/DL (ref 6.4–8.3)
RBC # BLD AUTO: 4.41 10E6/UL (ref 3.8–5.2)
SODIUM SERPL-SCNC: 137 MMOL/L (ref 136–145)
WBC # BLD AUTO: 6.8 10E3/UL (ref 4–11)

## 2022-09-21 PROCEDURE — 80053 COMPREHEN METABOLIC PANEL: CPT

## 2022-09-21 PROCEDURE — 36415 COLL VENOUS BLD VENIPUNCTURE: CPT

## 2022-09-21 PROCEDURE — 85014 HEMATOCRIT: CPT

## 2022-12-23 ENCOUNTER — OFFICE VISIT (OUTPATIENT)
Dept: FAMILY MEDICINE | Facility: CLINIC | Age: 47
End: 2022-12-23
Payer: COMMERCIAL

## 2022-12-23 VITALS
RESPIRATION RATE: 20 BRPM | TEMPERATURE: 97.2 F | OXYGEN SATURATION: 98 % | WEIGHT: 197.7 LBS | SYSTOLIC BLOOD PRESSURE: 124 MMHG | DIASTOLIC BLOOD PRESSURE: 72 MMHG | BODY MASS INDEX: 31.91 KG/M2 | HEART RATE: 85 BPM

## 2022-12-23 DIAGNOSIS — R53.83 FATIGUE, UNSPECIFIED TYPE: ICD-10-CM

## 2022-12-23 DIAGNOSIS — R06.02 SHORTNESS OF BREATH: ICD-10-CM

## 2022-12-23 DIAGNOSIS — Z86.19 HISTORY OF MONONUCLEOSIS: Primary | ICD-10-CM

## 2022-12-23 LAB
ALBUMIN SERPL BCG-MCNC: 4.3 G/DL (ref 3.5–5.2)
ALP SERPL-CCNC: 62 U/L (ref 35–104)
ALT SERPL W P-5'-P-CCNC: 11 U/L (ref 10–35)
ANION GAP SERPL CALCULATED.3IONS-SCNC: 12 MMOL/L (ref 7–15)
AST SERPL W P-5'-P-CCNC: 23 U/L (ref 10–35)
BASOPHILS # BLD AUTO: 0 10E3/UL (ref 0–0.2)
BASOPHILS NFR BLD AUTO: 0 %
BILIRUB SERPL-MCNC: 0.3 MG/DL
BUN SERPL-MCNC: 20.6 MG/DL (ref 6–20)
CALCIUM SERPL-MCNC: 9.6 MG/DL (ref 8.6–10)
CHLORIDE SERPL-SCNC: 104 MMOL/L (ref 98–107)
CREAT SERPL-MCNC: 0.88 MG/DL (ref 0.51–0.95)
DEPRECATED HCO3 PLAS-SCNC: 24 MMOL/L (ref 22–29)
EOSINOPHIL # BLD AUTO: 0.1 10E3/UL (ref 0–0.7)
EOSINOPHIL NFR BLD AUTO: 1 %
ERYTHROCYTE [DISTWIDTH] IN BLOOD BY AUTOMATED COUNT: 12.7 % (ref 10–15)
FERRITIN SERPL-MCNC: 47 NG/ML (ref 6–175)
GFR SERPL CREATININE-BSD FRML MDRD: 82 ML/MIN/1.73M2
GLUCOSE SERPL-MCNC: 88 MG/DL (ref 70–99)
HCT VFR BLD AUTO: 38.1 % (ref 35–47)
HGB BLD-MCNC: 12.9 G/DL (ref 11.7–15.7)
IMM GRANULOCYTES # BLD: 0 10E3/UL
IMM GRANULOCYTES NFR BLD: 0 %
LYMPHOCYTES # BLD AUTO: 3.2 10E3/UL (ref 0.8–5.3)
LYMPHOCYTES NFR BLD AUTO: 36 %
MCH RBC QN AUTO: 30 PG (ref 26.5–33)
MCHC RBC AUTO-ENTMCNC: 33.9 G/DL (ref 31.5–36.5)
MCV RBC AUTO: 89 FL (ref 78–100)
MONOCYTES # BLD AUTO: 0.7 10E3/UL (ref 0–1.3)
MONOCYTES NFR BLD AUTO: 8 %
NEUTROPHILS # BLD AUTO: 4.7 10E3/UL (ref 1.6–8.3)
NEUTROPHILS NFR BLD AUTO: 54 %
PLATELET # BLD AUTO: 306 10E3/UL (ref 150–450)
POTASSIUM SERPL-SCNC: 4.2 MMOL/L (ref 3.4–5.3)
PROT SERPL-MCNC: 7.2 G/DL (ref 6.4–8.3)
RBC # BLD AUTO: 4.3 10E6/UL (ref 3.8–5.2)
SODIUM SERPL-SCNC: 140 MMOL/L (ref 136–145)
TSH SERPL DL<=0.005 MIU/L-ACNC: 2.13 UIU/ML (ref 0.3–4.2)
WBC # BLD AUTO: 8.7 10E3/UL (ref 4–11)

## 2022-12-23 PROCEDURE — 86039 ANTINUCLEAR ANTIBODIES (ANA): CPT | Performed by: FAMILY MEDICINE

## 2022-12-23 PROCEDURE — 36415 COLL VENOUS BLD VENIPUNCTURE: CPT | Performed by: FAMILY MEDICINE

## 2022-12-23 PROCEDURE — 86038 ANTINUCLEAR ANTIBODIES: CPT | Performed by: FAMILY MEDICINE

## 2022-12-23 PROCEDURE — 82728 ASSAY OF FERRITIN: CPT | Performed by: FAMILY MEDICINE

## 2022-12-23 PROCEDURE — 99213 OFFICE O/P EST LOW 20 MIN: CPT | Performed by: FAMILY MEDICINE

## 2022-12-23 PROCEDURE — 80050 GENERAL HEALTH PANEL: CPT | Performed by: FAMILY MEDICINE

## 2022-12-23 RX ORDER — ALBUTEROL SULFATE 90 UG/1
2 AEROSOL, METERED RESPIRATORY (INHALATION) EVERY 6 HOURS PRN
Qty: 18 G | Refills: 0 | Status: SHIPPED | OUTPATIENT
Start: 2022-12-23 | End: 2023-02-21

## 2022-12-23 RX ORDER — ECHINACEA PURPUREA EXTRACT 125 MG
TABLET ORAL
COMMUNITY
Start: 2022-12-23 | End: 2024-03-13

## 2022-12-23 ASSESSMENT — PATIENT HEALTH QUESTIONNAIRE - PHQ9
SUM OF ALL RESPONSES TO PHQ QUESTIONS 1-9: 4
10. IF YOU CHECKED OFF ANY PROBLEMS, HOW DIFFICULT HAVE THESE PROBLEMS MADE IT FOR YOU TO DO YOUR WORK, TAKE CARE OF THINGS AT HOME, OR GET ALONG WITH OTHER PEOPLE: SOMEWHAT DIFFICULT
SUM OF ALL RESPONSES TO PHQ QUESTIONS 1-9: 4

## 2022-12-23 ASSESSMENT — PAIN SCALES - GENERAL: PAINLEVEL: MILD PAIN (3)

## 2022-12-23 NOTE — PROGRESS NOTES
"  Assessment & Plan     History of mononucleosis: diagnosed in May 2022    Fatigue, unspecified type  Symptoms could be persistent from mono but will check metabolic panel, thyroid, ferritin, and blood counts for additional evaluation.  - Comprehensive metabolic panel (BMP + Alb, Alk Phos, ALT, AST, Total. Bili, TP); Future  - TSH with free T4 reflex; Future  - Ferritin; Future  - CBC with platelets and differential; Future  - Anti Nuclear Mary Kay IgG by IFA with Reflex; Future  - General PFT Lab (Please always keep checked); Future  - albuterol (PROAIR HFA/PROVENTIL HFA/VENTOLIN HFA) 108 (90 Base) MCG/ACT inhaler; Inhale 2 puffs into the lungs every 6 hours as needed for shortness of breath, wheezing or cough  - Comprehensive metabolic panel (BMP + Alb, Alk Phos, ALT, AST, Total. Bili, TP)  - TSH with free T4 reflex  - Ferritin  - CBC with platelets and differential  - Anti Nuclear Mary Kay IgG by IFA with Reflex    Shortness of breath  Try albuterol for wheezing or shortness of breath. Will check PFTs as well.  - General PFT Lab (Please always keep checked); Future  - albuterol (PROAIR HFA/PROVENTIL HFA/VENTOLIN HFA) 108 (90 Base) MCG/ACT inhaler; Inhale 2 puffs into the lungs every 6 hours as needed for shortness of breath, wheezing or cough           BMI:   Estimated body mass index is 31.91 kg/m  as calculated from the following:    Height as of 9/19/22: 1.676 m (5' 6\").    Weight as of this encounter: 89.7 kg (197 lb 11.2 oz).       No follow-ups on file.    Yeison Norton DO  [facility] SHELBY Bruner is a 47 year old presenting for the following health issues:  RECHECK    History of Present Illness       Reason for visit:  Follow up on mono infection, ongoing symptoms.    She eats 2-3 servings of fruits and vegetables daily.She consumes 0 sweetened beverage(s) daily.She exercises with enough effort to increase her heart rate 9 or less minutes per day.  She exercises with enough " effort to increase her heart rate 3 or less days per week.   She is taking medications regularly.    Today's PHQ-9         PHQ-9 Total Score: 4    PHQ-9 Q9 Thoughts of better off dead/self-harm past 2 weeks :   Not at all    How difficult have these problems made it for you to do your work, take care of things at home, or get along with other people: Somewhat difficult     Symptoms are better and she is able to work but is just tired with tasks (dishes, stairs) at home. She will notice that she will even feel more out of breath going up on flight of stairs. Feels like her lung capacity is diminished where she is wheezing. Does have history of allergies and eczema but no history of asthma.      Review of Systems   Constitutional, HEENT, cardiovascular, pulmonary, gi and gu systems are negative, except as otherwise noted.      Objective    /72 (BP Location: Left arm, Cuff Size: Adult Regular)   Pulse 85   Temp 97.2  F (36.2  C) (Tympanic)   Resp 20   Wt 89.7 kg (197 lb 11.2 oz)   SpO2 98%   BMI 31.91 kg/m    Body mass index is 31.91 kg/m .  Physical Exam   GENERAL: healthy, alert and no distress  HENT: ear canals and TM's normal, nose and mouth without ulcers or lesions  NECK: no adenopathy and no asymmetry, masses, or scars  RESP: lungs clear to auscultation - no rales, rhonchi or wheezes  CV: regular rates and rhythm, normal S1 S2, no S3 or S4 and no murmur, click or rub  PSYCH: mentation appears normal, affect normal/bright

## 2022-12-24 ENCOUNTER — HEALTH MAINTENANCE LETTER (OUTPATIENT)
Age: 47
End: 2022-12-24

## 2022-12-26 DIAGNOSIS — R76.8 POSITIVE ANA (ANTINUCLEAR ANTIBODY): Primary | ICD-10-CM

## 2022-12-26 DIAGNOSIS — R53.83 FATIGUE, UNSPECIFIED TYPE: ICD-10-CM

## 2022-12-26 LAB
ANA PAT SER IF-IMP: ABNORMAL
ANA SER QL IF: POSITIVE
ANA TITR SER IF: ABNORMAL {TITER}

## 2023-01-03 DIAGNOSIS — N94.6 DYSMENORRHEA: ICD-10-CM

## 2023-01-04 NOTE — TELEPHONE ENCOUNTER
Routing refill request to provider for review/approval because:  Drug interaction warning  Beth BAKER RN, BSN

## 2023-01-05 RX ORDER — NORETHINDRONE ACETATE AND ETHINYL ESTRADIOL .02; 1 MG/1; MG/1
TABLET ORAL
Qty: 84 TABLET | Refills: 3 | Status: SHIPPED | OUTPATIENT
Start: 2023-01-05 | End: 2023-11-21

## 2023-01-05 NOTE — TELEPHONE ENCOUNTER
Due for preventative visit.      Refill completed for JOSSIE Vera PA-C while she is out of clinic.     - JMeixl, CNP

## 2023-01-06 ENCOUNTER — MYC MEDICAL ADVICE (OUTPATIENT)
Dept: FAMILY MEDICINE | Facility: CLINIC | Age: 48
End: 2023-01-06

## 2023-02-02 ENCOUNTER — HOSPITAL ENCOUNTER (OUTPATIENT)
Dept: MAMMOGRAPHY | Facility: CLINIC | Age: 48
Discharge: HOME | End: 2023-02-02
Attending: PHYSICIAN ASSISTANT | Admitting: PHYSICIAN ASSISTANT
Payer: COMMERCIAL

## 2023-02-02 DIAGNOSIS — Z12.31 VISIT FOR SCREENING MAMMOGRAM: ICD-10-CM

## 2023-02-02 PROCEDURE — 77067 SCR MAMMO BI INCL CAD: CPT

## 2023-02-02 NOTE — RESULT ENCOUNTER NOTE
Dear Zohreh,     -Mammogram was normal.  ADVISE: rechecking in 1 year.      Please send a Vuzit message or call [phone]  if you have any questions.      DOMINIC Salinas, CNP for Fide Vera PA-C while she is out of clinic.    [employer] [facility]

## 2023-02-06 DIAGNOSIS — F41.9 ANXIETY: ICD-10-CM

## 2023-02-20 ASSESSMENT — ENCOUNTER SYMPTOMS
PARESTHESIAS: 1
DYSURIA: 0
JOINT SWELLING: 0
SORE THROAT: 0
BREAST MASS: 0
HEADACHES: 1
PALPITATIONS: 0
COUGH: 1
FREQUENCY: 0
NAUSEA: 0
MYALGIAS: 0
CONSTIPATION: 0
ARTHRALGIAS: 0
DIARRHEA: 0
NERVOUS/ANXIOUS: 0
HEARTBURN: 0
FEVER: 0
HEMATURIA: 0
SHORTNESS OF BREATH: 0
HEMATOCHEZIA: 0
DIZZINESS: 0
WEAKNESS: 1
ABDOMINAL PAIN: 0
EYE PAIN: 0
CHILLS: 0

## 2023-02-21 ENCOUNTER — OFFICE VISIT (OUTPATIENT)
Dept: FAMILY MEDICINE | Facility: CLINIC | Age: 48
End: 2023-02-21
Payer: COMMERCIAL

## 2023-02-21 VITALS
RESPIRATION RATE: 16 BRPM | TEMPERATURE: 97 F | WEIGHT: 192 LBS | DIASTOLIC BLOOD PRESSURE: 70 MMHG | HEIGHT: 65 IN | HEART RATE: 79 BPM | OXYGEN SATURATION: 98 % | SYSTOLIC BLOOD PRESSURE: 114 MMHG | BODY MASS INDEX: 31.99 KG/M2

## 2023-02-21 DIAGNOSIS — Z13.220 SCREENING FOR HYPERLIPIDEMIA: ICD-10-CM

## 2023-02-21 DIAGNOSIS — R06.02 SHORTNESS OF BREATH: ICD-10-CM

## 2023-02-21 DIAGNOSIS — Z13.1 SCREENING FOR DIABETES MELLITUS: ICD-10-CM

## 2023-02-21 DIAGNOSIS — N28.0 RENAL INFARCT (H): ICD-10-CM

## 2023-02-21 DIAGNOSIS — Z00.00 ROUTINE GENERAL MEDICAL EXAMINATION AT A HEALTH CARE FACILITY: Primary | ICD-10-CM

## 2023-02-21 DIAGNOSIS — N94.6 DYSMENORRHEA: ICD-10-CM

## 2023-02-21 DIAGNOSIS — F41.9 ANXIETY: ICD-10-CM

## 2023-02-21 DIAGNOSIS — F33.42 RECURRENT MAJOR DEPRESSIVE DISORDER, IN FULL REMISSION (H): ICD-10-CM

## 2023-02-21 PROCEDURE — 99396 PREV VISIT EST AGE 40-64: CPT | Performed by: FAMILY MEDICINE

## 2023-02-21 RX ORDER — ALBUTEROL SULFATE 90 UG/1
2 AEROSOL, METERED RESPIRATORY (INHALATION) EVERY 6 HOURS PRN
Qty: 18 G | Refills: 0 | Status: SHIPPED | OUTPATIENT
Start: 2023-02-21 | End: 2023-03-22

## 2023-02-21 ASSESSMENT — ENCOUNTER SYMPTOMS
ABDOMINAL PAIN: 0
JOINT SWELLING: 0
FEVER: 0
EYE PAIN: 0
SORE THROAT: 0
CONSTIPATION: 0
PALPITATIONS: 0
COUGH: 1
ARTHRALGIAS: 0
PARESTHESIAS: 1
DIARRHEA: 0
WEAKNESS: 1
FREQUENCY: 0
HEMATOCHEZIA: 0
NERVOUS/ANXIOUS: 0
HEARTBURN: 0
SHORTNESS OF BREATH: 0
MYALGIAS: 0
CHILLS: 0
NAUSEA: 0
DYSURIA: 0
HEMATURIA: 0
DIZZINESS: 0
BREAST MASS: 0
HEADACHES: 1

## 2023-02-21 NOTE — PROGRESS NOTES
SUBJECTIVE:   CC: Zohreh is an 47 year old who presents for preventive health visit.     Patient has been advised of split billing requirements and indicates understanding: Yes       Healthy Habits:     Getting at least 3 servings of Calcium per day:  Yes    Bi-annual eye exam:  Yes    Dental care twice a year:  Yes    Sleep apnea or symptoms of sleep apnea:  Daytime drowsiness    Diet:  Regular (no restrictions)    Frequency of exercise:  1 day/week    Duration of exercise:  45-60 minutes    Taking medications regularly:  Yes    Medication side effects:  None    PHQ-2 Total Score: 1    Additional concerns today:  No      Anxiety Follow-Up    How are you doing with your anxiety since your last visit? No change    Are you having other symptoms that might be associated with anxiety? No    Have you had a significant life event? OTHER: yes boyfriend recently had colonoscopy and found a mass -- CEA level was normal    Are you feeling depressed? No    Do you have any concerns with your use of alcohol or other drugs? No    Social History     Tobacco Use     Smoking status: Never     Smokeless tobacco: Never   Vaping Use     Vaping Use: Never used   Substance Use Topics     Alcohol use: Yes     Comment: 2-3x per week, 2-3 drinks     Drug use: No     CRISTINA-7 SCORE 7/30/2020 10/8/2021 5/13/2022   Total Score - - -   Total Score 3 (minimal anxiety) - 2 (minimal anxiety)   Total Score 3 4 2     PHQ 10/8/2021 5/13/2022 12/23/2022   PHQ-9 Total Score 4 0 4   Q9: Thoughts of better off dead/self-harm past 2 weeks Not at all Not at all Not at all       Today's PHQ-2 Score:   PHQ-2 ( 1999 [employer]) 2/20/2023   Q1: Little interest or pleasure in doing things 1   Q2: Feeling down, depressed or hopeless 0   PHQ-2 Score 1   PHQ-2 Total Score (12-17 Years)- Positive if 3 or more points; Administer PHQ-A if positive -   Q1: Little interest or pleasure in doing things Several days   Q2: Feeling down, depressed or hopeless Not at all   PHQ-2  Score 1       Social History     Tobacco Use     Smoking status: Never     Smokeless tobacco: Never   Substance Use Topics     Alcohol use: Yes     Comment: 2-3x per week, 2-3 drinks         Alcohol Use 2/20/2023   Prescreen: >3 drinks/day or >7 drinks/week? Yes   Prescreen: >3 drinks/day or >7 drinks/week? -   AUDIT SCORE  11     Reviewed orders with patient.  Reviewed health maintenance and updated orders accordingly - Yes  Lab work is in process    Breast Cancer Screening:    Breast CA Risk Assessment (FHS-7) 10/7/2021 2/20/2023   Do you have a family history of breast, colon, or ovarian cancer? Yes No / Unknown         Mammogram Screening: Recommended annual mammography  Pertinent mammograms are reviewed under the imaging tab.    History of abnormal Pap smear: NO - age 30-65 PAP every 5 years with negative HPV co-testing recommended  PAP / HPV Latest Ref Rng & Units 7/24/2019 4/20/2016 7/18/2013   PAP (Historical) - NIL NIL NIL   HPV16 NEG:Negative Negative Negative -   HPV18 NEG:Negative Negative Negative -   HRHPV NEG:Negative Negative Negative -     Reviewed and updated as needed this visit by clinical staff   Tobacco   Meds  Problems  Med Hx  Surg Hx           Reviewed and updated as needed this visit by Provider                 Past Medical History:   Diagnosis Date     Acute laryngitis 11/17/2015     Allergic rhinitis 10/14/2003     Problem list name updated by automated process. Provider to review     Allergic rhinitis, cause unspecified     year round     Anxiety 07/01/2011     ASCUS on Pap smear 2000    ASCUS      Past Surgical History:   Procedure Laterality Date     BIOPSY  1992    Excision Right lateral neck lymph node     COLONOSCOPY N/A 09/19/2022    Procedure: COLONOSCOPY (FV);  Surgeon: Dennise Orozco MD;  Location:  GI     ENT SURGERY  1992    Tonsillectomy/Adnoidectomy     EYE SURGERY  2010    PRK vision repair, bilateral     HEAD & NECK SURGERY  1992    Extraction wisdom teeth      "MOUTH SURGERY       SOFT TISSUE SURGERY       Z NONSPECIFIC PROCEDURE      wisdom teeth     ZZC NONSPECIFIC PROCEDURE      cervical Lymph node bx-related to tonsils     ZZC NONSPECIFIC PROCEDURE      Tonsillectomy     ZZC NONSPECIFIC PROCEDURE  2000    colposcopy s/p ASCUS       Review of Systems   Constitutional: Negative for chills and fever.   HENT: Positive for hearing loss. Negative for congestion, ear pain and sore throat.    Eyes: Negative for pain and visual disturbance.   Respiratory: Positive for cough. Negative for shortness of breath.    Cardiovascular: Negative for chest pain, palpitations and peripheral edema.   Gastrointestinal: Negative for abdominal pain, constipation, diarrhea, heartburn, hematochezia and nausea.   Breasts:  Negative for tenderness, breast mass and discharge.   Genitourinary: Negative for dysuria, frequency, genital sores, hematuria, pelvic pain, urgency, vaginal bleeding and vaginal discharge.   Musculoskeletal: Negative for arthralgias, joint swelling and myalgias.   Skin: Negative for rash.   Neurological: Positive for weakness, headaches and paresthesias. Negative for dizziness.   Psychiatric/Behavioral: Negative for mood changes. The patient is not nervous/anxious.         OBJECTIVE:   /70 (BP Location: Right arm, Patient Position: Chair, Cuff Size: Adult Large)   Pulse 79   Temp 97  F (36.1  C) (Tympanic)   Resp 16   Ht 1.657 m (5' 5.25\")   Wt 87.1 kg (192 lb)   SpO2 98%   BMI 31.71 kg/m    Physical Exam  GENERAL: healthy, alert and no distress  EYES: Eyes grossly normal to inspection  HENT: ear canals and TM's normal, nose and mouth without ulcers or lesions  NECK: no adenopathy and no asymmetry, masses, or scars  RESP: lungs clear to auscultation - no rales, rhonchi or wheezes  CV: regular rates and rhythm, normal S1 S2, no S3 or S4 and no murmur, click or rub  MS: no gross musculoskeletal defects noted, no edema  SKIN: no suspicious lesions or rashes  PSYCH: " "mentation appears normal, affect normal/bright    Diagnostic Test Results:  Labs reviewed in Epic    ASSESSMENT/PLAN:   1. Routine general medical examination at a health care facility  Health maintenance reviewed and updated. Emphasized importance of balanced diet and regular exercise    2. Dysmenorrhea  Continue on OCP. May need to change pharmacy to get 90 day supply.    3. Shortness of breath  Improved, continue PRN  - albuterol (PROAIR HFA/PROVENTIL HFA/VENTOLIN HFA) 108 (90 Base) MCG/ACT inhaler; Inhale 2 puffs into the lungs every 6 hours as needed for shortness of breath, wheezing or cough  Dispense: 18 g; Refill: 0    4. Screening for diabetes mellitus  - Glucose; Future    5. Screening for hyperlipidemia  - Lipid panel reflex to direct LDL Fasting; Future    6. Anxiety  Well controlled. Continue Zoloft  - sertraline (ZOLOFT) 50 MG tablet; Take 1 tablet (50 mg) by mouth daily  Dispense: 90 tablet; Refill: 3    7. Recurrent major depressive disorder, in full remission (H)  stable    8. Renal infarct (H) - \"prior infarction\" R upper pole - incidental finding on 5/27/2022 CT scan       Patient has been advised of split billing requirements and indicates understanding: Yes      COUNSELING:  Reviewed preventive health counseling, as reflected in patient instructions      BMI:   Estimated body mass index is 31.71 kg/m  as calculated from the following:    Height as of this encounter: 1.657 m (5' 5.25\").    Weight as of this encounter: 87.1 kg (192 lb).   Weight management plan: Discussed healthy diet and exercise guidelines - planning to restart yoga, goes snowshoeing.       She reports that she has never smoked. She has never used smokeless tobacco.      Yeison Norton, DO  [facility] [place]  "

## 2023-03-20 DIAGNOSIS — R06.02 SHORTNESS OF BREATH: ICD-10-CM

## 2023-03-22 RX ORDER — ALBUTEROL SULFATE 90 UG/1
2 AEROSOL, METERED RESPIRATORY (INHALATION) EVERY 6 HOURS PRN
Qty: 8.5 G | Refills: 1 | Status: SHIPPED | OUTPATIENT
Start: 2023-03-22

## 2023-06-08 ENCOUNTER — OFFICE VISIT (OUTPATIENT)
Dept: RHEUMATOLOGY | Facility: CLINIC | Age: 48
End: 2023-06-08
Attending: FAMILY MEDICINE
Payer: COMMERCIAL

## 2023-06-08 ENCOUNTER — LAB (OUTPATIENT)
Dept: LAB | Facility: CLINIC | Age: 48
End: 2023-06-08
Payer: COMMERCIAL

## 2023-06-08 VITALS
SYSTOLIC BLOOD PRESSURE: 110 MMHG | HEART RATE: 78 BPM | WEIGHT: 196.9 LBS | DIASTOLIC BLOOD PRESSURE: 72 MMHG | BODY MASS INDEX: 32.52 KG/M2

## 2023-06-08 DIAGNOSIS — R53.83 FATIGUE, UNSPECIFIED TYPE: ICD-10-CM

## 2023-06-08 DIAGNOSIS — R76.8 POSITIVE ANA (ANTINUCLEAR ANTIBODY): ICD-10-CM

## 2023-06-08 LAB
CREAT UR-MCNC: 116 MG/DL
CRP SERPL-MCNC: 10.7 MG/L
DEPRECATED CALCIDIOL+CALCIFEROL SERPL-MC: 55 UG/L (ref 20–75)
ERYTHROCYTE [SEDIMENTATION RATE] IN BLOOD BY WESTERGREN METHOD: 13 MM/HR (ref 0–20)
MICROALBUMIN UR-MCNC: <12 MG/L
MICROALBUMIN/CREAT UR: NORMAL MG/G{CREAT}

## 2023-06-08 PROCEDURE — 86140 C-REACTIVE PROTEIN: CPT

## 2023-06-08 PROCEDURE — 82306 VITAMIN D 25 HYDROXY: CPT

## 2023-06-08 PROCEDURE — 86376 MICROSOMAL ANTIBODY EACH: CPT

## 2023-06-08 PROCEDURE — 86235 NUCLEAR ANTIGEN ANTIBODY: CPT

## 2023-06-08 PROCEDURE — 85613 RUSSELL VIPER VENOM DILUTED: CPT | Performed by: PHYSICIAN ASSISTANT

## 2023-06-08 PROCEDURE — 82570 ASSAY OF URINE CREATININE: CPT

## 2023-06-08 PROCEDURE — 86147 CARDIOLIPIN ANTIBODY EA IG: CPT | Performed by: PHYSICIAN ASSISTANT

## 2023-06-08 PROCEDURE — 99204 OFFICE O/P NEW MOD 45 MIN: CPT | Performed by: PHYSICIAN ASSISTANT

## 2023-06-08 PROCEDURE — 85390 FIBRINOLYSINS SCREEN I&R: CPT | Performed by: PATHOLOGY

## 2023-06-08 PROCEDURE — 85730 THROMBOPLASTIN TIME PARTIAL: CPT | Performed by: PHYSICIAN ASSISTANT

## 2023-06-08 PROCEDURE — 82043 UR ALBUMIN QUANTITATIVE: CPT

## 2023-06-08 PROCEDURE — 36415 COLL VENOUS BLD VENIPUNCTURE: CPT | Performed by: PHYSICIAN ASSISTANT

## 2023-06-08 PROCEDURE — 86160 COMPLEMENT ANTIGEN: CPT

## 2023-06-08 PROCEDURE — 86225 DNA ANTIBODY NATIVE: CPT

## 2023-06-08 PROCEDURE — 86146 BETA-2 GLYCOPROTEIN ANTIBODY: CPT | Performed by: PHYSICIAN ASSISTANT

## 2023-06-08 PROCEDURE — 85652 RBC SED RATE AUTOMATED: CPT

## 2023-06-08 NOTE — PROGRESS NOTES
Rheumatology Clinic Visit  [facility]  SHIV Cox     Laure Mendez MRN# [id number]   YOB: 1975 Age: 48 year old   Date of Visit: 06/08/2023  Primary care provider: Fide Vera          Assessment and Plan:     1. Positive STEVIE  2. Fatigue    Patient presents today for an initial evaluation for a positive STEVIE. This was found upon further workup for persistent fatigue after EBV. She denies any joint symptoms No skin rashes or mucositis.  Physical examination did not show any active synovitis, dactylitis, tenosynovitis or enthesopathy.  She had no skin rashes.  No mucositis.  Previous laboratory evaluation and imaging studies reviewed.     Discussed with the patient that a positive STEVIE is of unknown significance.  10% of the healthy population can have a positive STEVIE.  We do get false positives with this test as well.  Discussed that a positive STEVIE can be associated with nonrheumatological autoimmune disorder such as thyroiditis.  It can also be associated with rheumatological autoimmune disorder such as connective tissue disorders or scleroderma.  A positive STEVIE has also been known to be associated with some viral and bacterial infections.  Given the persistent fatigue that she has been having and the elevated STEVIE we will check the STEVIE subsets.  If these are normal/negative then the likelihood of her having a systemic autoimmune condition such as lupus is removed.  I will contact the patient with the results once they are complete.      Plan:    1. Schedule follow-up with Madisyn Auguste PA-C as needed  2. Labs: dsDNA, complement levels, RUBEN panel, antiphospholipid panel, Urine (looking for protein), thyroid peroxidase antibody, CRP and Sed Rate    PRERNA BrisenoS    I independently evaluated the patient with both HPI and physical exam.     SHIV Cox  Rheumatology         History of Present Illness:   Laure Mendez presents for evaluation of  fatigue, positive STEVIE.     Mononucleosis diagnosed in May 2022.    In May of 2022, patient noticed enlarged lymph nodes at left groin and developed a headache, fever, loss of appetite, dehydration. Was first evaluated at urgent care followed by the ED and was found to have elevated LFTs. Lumbar puncture was normal. Due to persistent symptoms of fatigue, fevers, nausea, inability to keep hydrated, headache, patient had to take a month off of work. When patient returned to work, she would be so tired afterwards that she would have to nap for hours. She returned to her primary care provider for additional workup of fatigue. Was found to be positive for mono. Her fatigue has persisted for several months which prompted additional workup which is when her STEVIE was found to be elevated. Patient is unsure whether her elevated STEVIE is also due to her mono infection or if she has an additional underlying autoimmune condition as her sister was also found to have an elevated STEVIE after a mono infection. Patient is very active at work and denies discomfort or joint pain with these activities. She does wear compression stockings for lower extremity swelling that she gets from standing a lot at work which helps. Patient reports her fatigue has somewhat improved since last year, but she still has to take daily naps after work.     Denies history of unexplained fevers. Has chronic fatigue. Intermittent night sweats, averages 3 per week, patient unsure if this is perimenopause. No unexplained weight loss. Gets short of breath going up stairs ever since her mono infection. Has dry mouth but is unsure if it is just due to low water intake during the day. Has occasional dry eyes but notes a history of PRK surgery and history of histoplasmosis retinitis and is unsure if this is the cause. No history of pericarditis/pleuritis, uveitis/irits. No mouth sores. Patient developed itchy rash during the fall/winter that started on hips and moved to  "the trunk, present for 2 weeks and has not recurred. No facial rashes, sun sensitivity, history of heartburn, difficulty swallowing food/pills, delirium, psychosis, blood clots, seizures, or miscarriages. Sometimes has troubles swallowing liquids. Has history of infertility. No Raynaud's.     Patient does have a family history of Chron's, thyroid disease. No other personal or family history of autoimmune disease.         Review of Systems:     Constitutional: as above.  Skin: negative  Eyes: negative  Ears/Nose/Throat: negative  Respiratory: No shortness of breath, cough, or hemoptysis. 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 Grew-up along [facility] in [place], [place] - risk factor fo histoplasmosis infection 10/07/2021     Priority: Medium     Bilateral carpal tunnel syndrome - s/p release on R. now pursuing L 11/01/2019     Priority: Medium     Major depression in complete remission (H) 09/09/2019     Priority: Medium     Elevated LDL cholesterol level 07/18/2018     Priority: Medium     Chronic non-seasonal allergic rhinitis, unspecified trigger 07/18/2018     Priority: Medium     Overweight (BMI 25.0-29.9) 07/18/2018     Priority: Medium     Anxiety 07/01/2011     Priority: Medium     CARDIOVASCULAR SCREENING; LDL GOAL LESS THAN 160 05/26/2011     Priority: Medium            Past Medical History: "     Past Medical History:   Diagnosis Date     Acute laryngitis 11/17/2015     Allergic rhinitis 10/14/2003     Problem list name updated by automated process. Provider to review     Allergic rhinitis, cause unspecified     year round     Anxiety 07/01/2011     ASCUS on Pap smear 2000    ASCUS     Past Surgical History:   Procedure Laterality Date     BIOPSY  1992    Excision Right lateral neck lymph node     COLONOSCOPY N/A 09/19/2022    Procedure: COLONOSCOPY (FV);  Surgeon: Dennise Orozco MD;  Location:  GI     ENT SURGERY  1992    Tonsillectomy/Adnoidectomy     EYE SURGERY  2010    PRK vision repair, bilateral     HEAD & NECK SURGERY  1992    Extraction wisdom teeth     MOUTH SURGERY       SOFT TISSUE SURGERY       ZZC NONSPECIFIC PROCEDURE      wisdom teeth     ZZC NONSPECIFIC PROCEDURE      cervical Lymph node bx-related to tonsils     ZZC NONSPECIFIC PROCEDURE      Tonsillectomy     ZZC NONSPECIFIC PROCEDURE  2000    colposcopy s/p ASCUS            Social History:     Social History     Socioeconomic History     Marital status:      Spouse name: Андрей     Number of children: 0     Years of education: 19     Highest education level: Not on file   Occupational History     Occupation: Physician Assistant     Comment: general surgery   Tobacco Use     Smoking status: Never     Smokeless tobacco: Never   Vaping Use     Vaping status: Never Used   Substance and Sexual Activity     Alcohol use: Yes     Comment: 2-3x per week, 2-3 drinks     Drug use: No     Sexual activity: Yes     Partners: Male     Birth control/protection: Pill   Other Topics Concern      Service Not Asked     Blood Transfusions Not Asked     Caffeine Concern Yes     Comment: 2c/day     Occupational Exposure Not Asked     Hobby Hazards Not Asked     Sleep Concern Not Asked     Stress Concern Not Asked     Weight Concern Not Asked     Special Diet No     Comment: calcium daily, balanced     Back Care Not Asked     Exercise Yes      Comment: walking, elliptical     Bike Helmet Not Asked     Seat Belt Yes     Self-Exams Yes     Parent/sibling w/ CABG, MI or angioplasty before 65F 55M? No   Social History Narrative     Not on file     Social Determinants of Health     Financial Resource Strain: Not on file   Food Insecurity: Not on file   Transportation Needs: Not on file   Physical Activity: Not on file   Stress: Not on file   Social Connections: Not on file   Intimate Partner Violence: Not on file   Housing Stability: Not on file          Family History:     Family History   Problem Relation Age of Onset     Lipids Mother      Thyroid Disease Mother         Had thyroid surgery, benign nodule, now on replacement hormone     Hyperlipidemia Mother         on medication     Anxiety Disorder Mother      Hypertension Father         on multiple medications     Asthma Father         prn inhaler     Allergies Father         Asthma     Coronary Artery Disease Father         Had 2 stents placed to treat 3 areas of stenosis     Pacemaker Father      Coronary Artery Disease Maternal Grandfather         multiple MI     Cardiovascular Paternal Grandfather         PE     Allergies Sister         excercise induced asthma     Genitourinary Problems Sister         multi ASCUS paps     Breast Cancer Paternal Aunt 80     Breast Cancer Paternal Aunt 80     Crohn's Disease Paternal Aunt      Anxiety Disorder Sister      Diabetes No family hx of      Colon Cancer No family hx of             Allergies:     Allergies   Allergen Reactions     Bactrim [Sulfamethoxazole W-Trimethoprim]             Medications:     Current Outpatient Medications   Medication Sig Dispense Refill     albuterol (PROAIR HFA/PROVENTIL HFA/VENTOLIN HFA) 108 (90 Base) MCG/ACT inhaler INHALE 2 PUFFS INTO THE LUNGS EVERY 6 HOURS AS NEEDED FOR SHORTNESS OF BREATH, WHEEZING OR COUGH 8.5 g 1     Echinacea 125 MG TABS        ferrous sulfate (FEROSUL) 325 (65 Fe) MG tablet Take 325 mg by mouth daily  (with breakfast) Twice weekly        fluticasone (FLONASE) 50 MCG/ACT nasal spray USE TWO SPRAYS IN EACH NOSTRIL EVERY DAY 48 g 3     hydrOXYzine (ATARAX) 25 MG tablet Take 1 tablet (25 mg) by mouth 3 times daily as needed for anxiety 30 tablet 1     loratadine (CLARITIN) 10 MG tablet Take 10 mg by mouth daily       multivitamin (ONE-DAILY) tablet Take 1 tablet by mouth daily       norethindrone-ethinyl estradiol (MICROGESTIN) 1-20 MG-MCG tablet TAKE ONE TABLET BY MOUTH DAILY CONTINUOUSLY 84 tablet 3     sertraline (ZOLOFT) 50 MG tablet Take 1 tablet (50 mg) by mouth daily 90 tablet 3     Vitamin D, Cholecalciferol, 1000 units TABS               Physical Exam:   Blood pressure 110/72, pulse 78, weight 89.3 kg (196 lb 14.4 oz), not currently breastfeeding.  Wt Readings from Last 6 Encounters:   06/08/23 89.3 kg (196 lb 14.4 oz)   02/21/23 87.1 kg (192 lb)   12/23/22 89.7 kg (197 lb 11.2 oz)   09/19/22 79.4 kg (175 lb)   06/10/22 74.8 kg (165 lb)   06/01/22 78.9 kg (174 lb)     Constitutional: well-developed, appearing stated age; cooperative  Eyes: nl PERRLA, conjunctiva, sclera  ENT: nl external ears, nose, hearing, lips, teeth, gums, throat. No mucositis.   No mucous membrane lesions, normal saliva pool  Neck: no mass or thyroid enlargement  Resp: lungs clear to auscultation  CV: RRR, no murmurs, rubs or gallops, no edema  Lymph: no cervical, supraclavicular or epitrochlear nodes  MS: The TMJ, neck, shoulder, elbow, wrist, MCP/PIP/DIP, spine,  knee, ankle, and foot MTP/IP joints were examined and found normal. No active synovitis or altered joint anatomy. Full joint ROM. Normal  strength. No dactylitis,  tenosynovitis, enthesopathy.   Skin: no nail pitting, alopecia, rash, nodules or lesions.   Psych: nl judgement, orientation, memory, affect.           Data:   Imaging:  Right wrist x-ray 8/25/2019  IMPRESSION: Longitudinal fracture distal dorsal radial metaphysis and epiphysis. There is minimal dorsal  displacement of fracture in the  lateral projection. No other acute-appearing abnormality or  significant degenerative change.    Laboratory:  12/23/2022  Creatinine 0.88, GFR 82  Albumin 4.3  ALT 11, AST 23  Ferritin 47  TSH 2.13  White blood cell count 8.7, hemoglobin 12.9, platelet count 306  STEVIE positive with a dense fine speckled pattern and a titer of 1:640

## 2023-06-08 NOTE — PATIENT INSTRUCTIONS
After Visit Instructions:     Thank you for coming to [facility] for your care. It is my goal to partner with you to help you reach your optimal state of health.       Plan:     Schedule follow-up with Madisyn Auguste PA-C as needed  Labs: dsDNA, complement levels, RUBEN panel, antiphospholipid panel, Urine (looking for protein), thyroid peroxidase antibody, CRP and Sed Rate      Madisyn Auguste PA-C  [facility]  [facility]    Contact information: [facility]  Clinic Number:  [phone]  Please call or send a Tapatap message with any questions about your care

## 2023-06-09 LAB
C3 SERPL-MCNC: 152 MG/DL (ref 81–157)
C4 SERPL-MCNC: 41 MG/DL (ref 13–39)
DRVVT SCREEN RATIO: 0.82
INR PPP: 0.92 (ref 0.85–1.15)
LA PPP-IMP: NEGATIVE
LUPUS INTERPRETATION: NORMAL
PTT RATIO: 0.92
THROMBIN TIME: 17.7 SECONDS (ref 13–19)
THYROPEROXIDASE AB SERPL-ACNC: <10 IU/ML

## 2023-06-12 LAB
B2 GLYCOPROT1 IGG SERPL IA-ACNC: <0.8 U/ML
B2 GLYCOPROT1 IGM SERPL IA-ACNC: <2.4 U/ML
CARDIOLIPIN IGG SER IA-ACNC: <2 GPL-U/ML
CARDIOLIPIN IGG SER IA-ACNC: NEGATIVE
CARDIOLIPIN IGM SER IA-ACNC: <2 MPL-U/ML
CARDIOLIPIN IGM SER IA-ACNC: NEGATIVE
DSDNA AB SER-ACNC: <0.6 IU/ML
ENA SM IGG SER IA-ACNC: <0.7 U/ML
ENA SM IGG SER IA-ACNC: NEGATIVE
ENA SS-A AB SER IA-ACNC: <0.5 U/ML
ENA SS-A AB SER IA-ACNC: NEGATIVE
ENA SS-B IGG SER IA-ACNC: <0.6 U/ML
ENA SS-B IGG SER IA-ACNC: NEGATIVE
U1 SNRNP IGG SER IA-ACNC: 1.2 U/ML
U1 SNRNP IGG SER IA-ACNC: NEGATIVE

## 2023-07-25 ENCOUNTER — OFFICE VISIT (OUTPATIENT)
Dept: INFECTIOUS DISEASES | Facility: CLINIC | Age: 48
End: 2023-07-25
Payer: COMMERCIAL

## 2023-07-25 ENCOUNTER — LAB (OUTPATIENT)
Dept: LAB | Facility: CLINIC | Age: 48
End: 2023-07-25
Payer: COMMERCIAL

## 2023-07-25 VITALS
DIASTOLIC BLOOD PRESSURE: 98 MMHG | WEIGHT: 197.8 LBS | SYSTOLIC BLOOD PRESSURE: 136 MMHG | HEART RATE: 74 BPM | OXYGEN SATURATION: 97 % | BODY MASS INDEX: 32.66 KG/M2

## 2023-07-25 DIAGNOSIS — Z13.1 SCREENING FOR DIABETES MELLITUS: ICD-10-CM

## 2023-07-25 DIAGNOSIS — Z13.220 SCREENING FOR HYPERLIPIDEMIA: ICD-10-CM

## 2023-07-25 DIAGNOSIS — R53.83 FATIGUE, UNSPECIFIED TYPE: Primary | ICD-10-CM

## 2023-07-25 DIAGNOSIS — R53.83 FATIGUE, UNSPECIFIED TYPE: ICD-10-CM

## 2023-07-25 LAB
ALBUMIN SERPL BCG-MCNC: 4.4 G/DL (ref 3.5–5.2)
ALP SERPL-CCNC: 83 U/L (ref 35–104)
ALT SERPL W P-5'-P-CCNC: 18 U/L (ref 0–50)
AST SERPL W P-5'-P-CCNC: 25 U/L (ref 0–45)
BILIRUB DIRECT SERPL-MCNC: <0.2 MG/DL (ref 0–0.3)
BILIRUB SERPL-MCNC: 0.4 MG/DL
CHOLEST SERPL-MCNC: 291 MG/DL
CREAT SERPL-MCNC: 0.93 MG/DL (ref 0.51–0.95)
FASTING STATUS PATIENT QL REPORTED: NORMAL
FERRITIN SERPL-MCNC: 31 NG/ML (ref 6–175)
GFR SERPL CREATININE-BSD FRML MDRD: 76 ML/MIN/1.73M2
GLUCOSE SERPL-MCNC: 93 MG/DL (ref 70–99)
HDLC SERPL-MCNC: 71 MG/DL
LDLC SERPL CALC-MCNC: 176 MG/DL
NONHDLC SERPL-MCNC: 220 MG/DL
PROT SERPL-MCNC: 7.4 G/DL (ref 6.4–8.3)
TRIGL SERPL-MCNC: 219 MG/DL

## 2023-07-25 PROCEDURE — 82728 ASSAY OF FERRITIN: CPT

## 2023-07-25 PROCEDURE — 82947 ASSAY GLUCOSE BLOOD QUANT: CPT

## 2023-07-25 PROCEDURE — 99000 SPECIMEN HANDLING OFFICE-LAB: CPT

## 2023-07-25 PROCEDURE — 86612 BLASTOMYCES ANTIBODY: CPT | Mod: 90

## 2023-07-25 PROCEDURE — 80076 HEPATIC FUNCTION PANEL: CPT

## 2023-07-25 PROCEDURE — 86698 HISTOPLASMA ANTIBODY: CPT | Mod: 90

## 2023-07-25 PROCEDURE — 82565 ASSAY OF CREATININE: CPT

## 2023-07-25 PROCEDURE — 99205 OFFICE O/P NEW HI 60 MIN: CPT | Performed by: INTERNAL MEDICINE

## 2023-07-25 PROCEDURE — 36415 COLL VENOUS BLD VENIPUNCTURE: CPT

## 2023-07-25 PROCEDURE — 80061 LIPID PANEL: CPT

## 2023-07-25 PROCEDURE — 87385 HISTOPLASMA CAPSUL AG IA: CPT | Mod: 90

## 2023-07-25 RX ORDER — CETIRIZINE HYDROCHLORIDE 10 MG/1
TABLET ORAL
COMMUNITY
Start: 2005-07-25

## 2023-07-25 NOTE — CONFIDENTIAL NOTE
Infectious Disease Clinic    Chief Complaint:  Consult (Fatigue )    Date: 07/25/2023   Patient name: LAURE HUERTA   YOB: 1975  MRN: [id number]    Assessment/Plan:  Laure was seen today for consult.    Diagnoses and all orders for this visit:    Fatigue, unspecified type: broad differential and likely multifactorial. Could be post viral associated with EBV with anticipated slow improvement. Has histo retinitis and a pulmonary nodule on CT AP so will check serum histo and obtain CT chest to check for histo. Low ferritin last December will recheck and if remains low, then increase iron supplementation. Additionally, with ongoing fatigue and frequent naps should probably be evaluated for sleep apnea-she will discuss with PCP.   -     Infectious Disease Referral  -     Histoplasma capsulatum antigen; Future  -     Histoplasma [place] by Immunodiffusion; Future  -     BLASTOMYCES ANTIBODY ID; Future  -     Ferritin; Future  -     Creatinine; Future  -     CT Chest w contrast; Future  -     Hepatic panel; Future  - will contact with results-if concern for histo then given symptoms would consider treatment.    Return to clinic PRN.    Geovanna Crowe MD  [facility]   Clinic phone: [phone]   Clinic fax: [phone]    HPI:  Laure Huerta is a 48 year old female who is referred for evaluation of fatigue    In fall 2021, diagnosed with histo retinitis, no treatment recommended. Not really symptoms but sometimes localized blurred vision  Had several stressors around this time (divorce, father passing away) but no fevers, etc. Histo workup with PCP negative (CXR)  5/2022 had EBV-missed work for a month, fevers for a couple weeks, severe fatigue where had to rest all the time. Terrible headache. LP with 0 nucleated cells. 15 lbs weight loss. Had LAD in groin but not cervical. 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 Call one night a week (can get called in) plus one weekend a month. When gets called in overnight especially several times in a week, feels extraordinarily fatigued and takes some time to recover. Frequently takes a 1-4 hour nap after work then can sleep well at night. Overall maybe not napping as frequently throughout the week but still fatigued. Doesn't know if she snores.   Donates plasma-much more frequently before getting ill but returned to once a month around a couple months ago. Had been on iron supplementation for this, was off for a bit and resumed a couple times a week recently.  Notes now weight gain-around 20 lbs up from typical weight.   Lives with 2 dogs, they are healthy. Born in [place], spends lots of times outdoors.   Reviewed prior labs-CXR ok but CT AP with a pulmonary nodule at base.     ROS: Complete 10-point ROS is negative except as noted above.    Past Medical History:  Past Medical History:   Diagnosis Date    Acute laryngitis 11/17/2015    Allergic rhinitis 10/14/2003     Problem list name updated by automated process. Provider to review    Allergic rhinitis, cause unspecified     year round    Anxiety 07/01/2011    ASCUS on Pap smear 2000    ASCUS       Past Surgical History:  Past Surgical History:   Procedure Laterality Date    BIOPSY  1992    Excision Right lateral neck lymph node    COLONOSCOPY N/A 09/19/2022    Procedure: COLONOSCOPY (FV);  Surgeon: Dennise Orozco MD;  Location:  GI    ENT SURGERY  1992    Tonsillectomy/Adnoidectomy    EYE SURGERY  2010    PRK vision repair, bilateral    HEAD & NECK SURGERY  1992    Extraction wisdom teeth    MOUTH SURGERY      SOFT TISSUE SURGERY      ZZC NONSPECIFIC PROCEDURE      wisdom teeth    ZZC NONSPECIFIC PROCEDURE      cervical Lymph node bx-related to tonsils    ZZC NONSPECIFIC PROCEDURE      Tonsillectomy    ZZC NONSPECIFIC PROCEDURE  2000    colposcopy s/p ASCUS       Social History:  Social History      Social History Narrative    Not on file       Family Medical History:  Family History   Problem Relation Age of Onset    Lipids Mother     Thyroid Disease Mother         Had thyroid surgery, benign nodule, now on replacement hormone    Hyperlipidemia Mother         on medication    Anxiety Disorder Mother     Hypertension Father         on multiple medications    Asthma Father         prn inhaler    Allergies Father         Asthma    Coronary Artery Disease Father         Had 2 stents placed to treat 3 areas of stenosis    Pacemaker Father     Coronary Artery Disease Maternal Grandfather         multiple MI    Cardiovascular Paternal Grandfather         PE    Allergies Sister         excercise induced asthma    Genitourinary Problems Sister         multi ASCUS paps    Breast Cancer Paternal Aunt 80    Breast Cancer Paternal Aunt 80    Crohn's Disease Paternal Aunt     Anxiety Disorder Sister     Diabetes No family hx of     Colon Cancer No family hx of        Allergies:  Allergies   Allergen Reactions    Bactrim [Sulfamethoxazole W-Trimethoprim]        Medications:  Current Outpatient Medications   Medication Sig Dispense Refill    albuterol (PROAIR HFA/PROVENTIL HFA/VENTOLIN HFA) 108 (90 Base) MCG/ACT inhaler INHALE 2 PUFFS INTO THE LUNGS EVERY 6 HOURS AS NEEDED FOR SHORTNESS OF BREATH, WHEEZING OR COUGH 8.5 g 1    cetirizine (ZYRTEC) 10 MG tablet       Echinacea 125 MG TABS       ferrous sulfate (FEROSUL) 325 (65 Fe) MG tablet Take 325 mg by mouth daily (with breakfast) Twice weekly       fluticasone (FLONASE) 50 MCG/ACT nasal spray USE TWO SPRAYS IN EACH NOSTRIL EVERY DAY 48 g 3    hydrOXYzine (ATARAX) 25 MG tablet Take 1 tablet (25 mg) by mouth 3 times daily as needed for anxiety 30 tablet 1    multivitamin (ONE-DAILY) tablet Take 1 tablet by mouth daily      norethindrone-ethinyl estradiol (MICROGESTIN) 1-20 MG-MCG tablet TAKE ONE TABLET BY MOUTH DAILY CONTINUOUSLY 84 tablet 3    sertraline (ZOLOFT) 50 MG  tablet Take 1 tablet (50 mg) by mouth daily 90 tablet 3    Vitamin D, Cholecalciferol, 1000 units TABS       loratadine (CLARITIN) 10 MG tablet Take 10 mg by mouth daily         Immunizations:  Immunization History   Administered Date(s) Administered    COVID-19 Bivalent 12+ (Pfizer) 10/19/2022    COVID-19 MONOVALENT 12+ (Pfizer) 12/23/2020, 01/12/2021, 10/21/2021    Flu, Unspecified 12/10/2020    Influenza (IIV3) PF 10/01/2011, 10/01/2012    Influenza Vaccine >6 months (Alfuria,Fluzone) 09/09/2019, 10/19/2021, 10/19/2022    Mantoux Tuberculin Skin Test 03/15/2012    TDAP Vaccine (Boostrix) 07/11/2013       Exam:  B/P: 136/98, T: Data Unavailable, P: 74, R: Data Unavailable, Weight: 197 lbs 12.8 oz  Gen: Alert and in no distress.   Psych: Normal affect. Alert and oriented.   HEENT:  No icterus. Oropharynx pink and moist without lesions.   Neck: No lymphadenopathy.   CV: Regular rate and rhythm without m/r/g.   Chest: Clear to auscultation bilaterally without wheezes or crackles.   Abdomen: Soft, non-distended. Non-tender. Normal bowel sounds.   Extremities: Warm and well perfused.   Skin: No rashes or lesions noted. Healing mosquito bites foot.     Labs:  Reviewed in EPIC.     Imaging:  No results found for this or any previous visit (from the past 744 hour(s)).     Total Time Spent 65 minutes including chart review, time with patient, orders, and documentation.

## 2023-07-28 LAB — SCANNED LAB RESULT: NORMAL

## 2023-07-29 LAB — B DERMAT AB SER QL ID: NOT DETECTED

## 2023-08-01 LAB — SCANNED LAB RESULT: NORMAL

## 2023-10-13 ENCOUNTER — HOSPITAL ENCOUNTER (OUTPATIENT)
Dept: CT IMAGING | Facility: CLINIC | Age: 48
Discharge: HOME | End: 2023-10-13
Attending: INTERNAL MEDICINE | Admitting: INTERNAL MEDICINE
Payer: COMMERCIAL

## 2023-10-13 DIAGNOSIS — R53.83 FATIGUE, UNSPECIFIED TYPE: ICD-10-CM

## 2023-10-13 PROCEDURE — 250N000011 HC RX IP 250 OP 636: Performed by: RADIOLOGY

## 2023-10-13 PROCEDURE — 71260 CT THORAX DX C+: CPT

## 2023-10-13 PROCEDURE — 250N000009 HC RX 250: Performed by: RADIOLOGY

## 2023-10-13 RX ORDER — IOPAMIDOL 755 MG/ML
500 INJECTION, SOLUTION INTRAVASCULAR ONCE
Status: COMPLETED | OUTPATIENT
Start: 2023-10-13 | End: 2023-10-13

## 2023-10-13 RX ADMIN — SODIUM CHLORIDE 63 ML: 9 INJECTION, SOLUTION INTRAVENOUS at 11:19

## 2023-10-13 RX ADMIN — IOPAMIDOL 99 ML: 755 INJECTION, SOLUTION INTRAVENOUS at 11:19

## 2023-11-27 ENCOUNTER — OFFICE VISIT (OUTPATIENT)
Dept: URGENT CARE | Facility: URGENT CARE | Age: 48
End: 2023-11-27
Payer: COMMERCIAL

## 2023-11-27 VITALS
TEMPERATURE: 98.3 F | WEIGHT: 197 LBS | BODY MASS INDEX: 32.53 KG/M2 | SYSTOLIC BLOOD PRESSURE: 126 MMHG | HEART RATE: 74 BPM | RESPIRATION RATE: 14 BRPM | DIASTOLIC BLOOD PRESSURE: 82 MMHG | OXYGEN SATURATION: 98 %

## 2023-11-27 DIAGNOSIS — J20.9 ACUTE BRONCHITIS, UNSPECIFIED ORGANISM: Primary | ICD-10-CM

## 2023-11-27 PROCEDURE — 99214 OFFICE O/P EST MOD 30 MIN: CPT | Performed by: PHYSICIAN ASSISTANT

## 2023-11-27 RX ORDER — BENZONATATE 100 MG/1
100 CAPSULE ORAL 3 TIMES DAILY PRN
Qty: 30 CAPSULE | Refills: 0 | Status: SHIPPED | OUTPATIENT
Start: 2023-11-27 | End: 2023-12-04

## 2023-11-27 RX ORDER — PREDNISONE 20 MG/1
40 TABLET ORAL DAILY
Qty: 10 TABLET | Refills: 0 | Status: SHIPPED | OUTPATIENT
Start: 2023-11-27 | End: 2023-12-02

## 2023-11-27 NOTE — PROGRESS NOTES
URGENT CARE VISIT:    SUBJECTIVE:   Laure Mendez is a 48 year old female presenting with a chief complaint of stuffy nose, cough - productive, and right back pain.  Onset was 12 day(s) ago.   She denies the following symptoms: shortness of breath, facial pain/pressure, vomiting, and diarrhea  Course of illness is worsening.    Treatment measures tried include Inhaler (name: albuterol) with some relief of symptoms.  Predisposing factors include None.    PMH:   Past Medical History:   Diagnosis Date    Acute laryngitis 11/17/2015    Allergic rhinitis 10/14/2003     Problem list name updated by automated process. Provider to review    Allergic rhinitis, cause unspecified     year round    Anxiety 07/01/2011    ASCUS on Pap smear 2000    ASCUS     Allergies: Bactrim [sulfamethoxazole w-trimethoprim]   Medications:   Current Outpatient Medications   Medication Sig Dispense Refill    albuterol (PROAIR HFA/PROVENTIL HFA/VENTOLIN HFA) 108 (90 Base) MCG/ACT inhaler INHALE 2 PUFFS INTO THE LUNGS EVERY 6 HOURS AS NEEDED FOR SHORTNESS OF BREATH, WHEEZING OR COUGH 8.5 g 1    benzonatate (TESSALON) 100 MG capsule Take 1 capsule (100 mg) by mouth 3 times daily as needed for cough 30 capsule 0    cetirizine (ZYRTEC) 10 MG tablet       Echinacea 125 MG TABS       ferrous sulfate (FEROSUL) 325 (65 Fe) MG tablet Take 325 mg by mouth daily (with breakfast) Twice weekly       fluticasone (FLONASE) 50 MCG/ACT nasal spray USE TWO SPRAYS IN EACH NOSTRIL EVERY DAY 48 g 3    multivitamin (ONE-DAILY) tablet Take 1 tablet by mouth daily      norethindrone-ethinyl estradiol (JUNEL 1/20) 1-20 MG-MCG tablet TAKE ONE TABLET BY MOUTH DAILY CONTINUOUSLY 84 tablet 0    predniSONE (DELTASONE) 20 MG tablet Take 2 tablets (40 mg) by mouth daily for 5 days 10 tablet 0    sertraline (ZOLOFT) 50 MG tablet Take 1 tablet (50 mg) by mouth daily 90 tablet 3    Vitamin D, Cholecalciferol, 1000 units TABS       hydrOXYzine (ATARAX) 25 MG tablet Take 1 tablet  (25 mg) by mouth 3 times daily as needed for anxiety (Patient not taking: Reported on 11/27/2023) 30 tablet 1    loratadine (CLARITIN) 10 MG tablet Take 10 mg by mouth daily       Social History:   Social History     Tobacco Use    Smoking status: Never    Smokeless tobacco: Never   Substance Use Topics    Alcohol use: Yes     Comment: 2-3x per week, 2-3 drinks       ROS:  General: negative  Skin: negative  Eyes: negative  Ears/Nose/Throat: nasal congestion  Respiratory: Cough  Cardiovascular: negative  Gastrointestinal: negative  Genitourinary: negative  Musculoskeletal: negative  Neurologic: negative      OBJECTIVE:  /82 (BP Location: Right arm, Patient Position: Chair, Cuff Size: Adult Regular)   Pulse 74   Temp 98.3  F (36.8  C) (Oral)   Resp 14   Wt 89.4 kg (197 lb)   SpO2 98%   BMI 32.53 kg/m    GENERAL APPEARANCE: healthy, alert and no distress  EYES: EOMI,  PERRL, conjunctiva clear  HENT: ear canals and TM's normal.  Nose and mouth without ulcers, erythema or lesions  NECK: supple, nontender, no lymphadenopathy  RESP: lungs clear to auscultation - no rales, rhonchi or wheezes  CV: regular rates and rhythm, normal S1 S2, no murmur noted  SKIN: no suspicious lesions or rashes      ASSESSMENT:    ICD-10-CM    1. Acute bronchitis, unspecified organism  J20.9 benzonatate (TESSALON) 100 MG capsule     predniSONE (DELTASONE) 20 MG tablet          PLAN:  Patient Instructions   Patient was educated on the natural course of condition. Take medications as directed. Side effects discussed. Conservative measures discussed including rest, increased fluids, humidifier, and over-the-counter cough suppressants. Continue using albuterol inhaler as needed. See your primary care provider if symptoms do not improve in 7 days. Seek emergency care if you develop shortness of breath or fever over 103.    Patient verbalized understanding and is agreeable to plan. The patient was discharged ambulatory and in stable  condition.    DARIN Eckert...................  11/27/2023   5:19 PM

## 2023-11-27 NOTE — PATIENT INSTRUCTIONS
Patient was educated on the natural course of condition. Take medications as directed. Side effects discussed. Conservative measures discussed including rest, increased fluids, humidifier, and over-the-counter cough suppressants. Continue using albuterol inhaler as needed. See your primary care provider if symptoms do not improve in 7 days. Seek emergency care if you develop shortness of breath or fever over 103.

## 2023-12-06 ENCOUNTER — VIRTUAL VISIT (OUTPATIENT)
Dept: FAMILY MEDICINE | Facility: CLINIC | Age: 48
End: 2023-12-06
Payer: COMMERCIAL

## 2023-12-06 DIAGNOSIS — G47.8 SLEEP TALKING: ICD-10-CM

## 2023-12-06 DIAGNOSIS — R53.83 FATIGUE, UNSPECIFIED TYPE: Primary | ICD-10-CM

## 2023-12-06 DIAGNOSIS — J30.89 CHRONIC NON-SEASONAL ALLERGIC RHINITIS: ICD-10-CM

## 2023-12-06 DIAGNOSIS — B39.9 HISTOPLASMOSIS RETINITIS: ICD-10-CM

## 2023-12-06 DIAGNOSIS — F41.9 ANXIETY: ICD-10-CM

## 2023-12-06 DIAGNOSIS — N94.6 DYSMENORRHEA: ICD-10-CM

## 2023-12-06 DIAGNOSIS — E78.1 HYPERTRIGLYCERIDEMIA: ICD-10-CM

## 2023-12-06 DIAGNOSIS — Z13.220 LIPID SCREENING: ICD-10-CM

## 2023-12-06 DIAGNOSIS — H32 HISTOPLASMOSIS RETINITIS: ICD-10-CM

## 2023-12-06 PROCEDURE — 99213 OFFICE O/P EST LOW 20 MIN: CPT | Mod: VID | Performed by: PHYSICIAN ASSISTANT

## 2023-12-06 RX ORDER — NORETHINDRONE ACETATE AND ETHINYL ESTRADIOL .02; 1 MG/1; MG/1
TABLET ORAL
Qty: 84 TABLET | Refills: 3 | Status: SHIPPED | OUTPATIENT
Start: 2023-12-06

## 2023-12-06 RX ORDER — HYDROXYZINE HYDROCHLORIDE 25 MG/1
25 TABLET, FILM COATED ORAL 3 TIMES DAILY PRN
Qty: 30 TABLET | Refills: 0 | Status: SHIPPED | OUTPATIENT
Start: 2023-12-06 | End: 2024-03-13

## 2023-12-06 RX ORDER — FLUTICASONE PROPIONATE 50 MCG
SPRAY, SUSPENSION (ML) NASAL
Qty: 48 G | Refills: 3 | Status: SHIPPED | OUTPATIENT
Start: 2023-12-06

## 2023-12-06 ASSESSMENT — ANXIETY QUESTIONNAIRES
GAD7 TOTAL SCORE: 1
6. BECOMING EASILY ANNOYED OR IRRITABLE: SEVERAL DAYS
3. WORRYING TOO MUCH ABOUT DIFFERENT THINGS: NOT AT ALL
4. TROUBLE RELAXING: NOT AT ALL
GAD7 TOTAL SCORE: 1
IF YOU CHECKED OFF ANY PROBLEMS ON THIS QUESTIONNAIRE, HOW DIFFICULT HAVE THESE PROBLEMS MADE IT FOR YOU TO DO YOUR WORK, TAKE CARE OF THINGS AT HOME, OR GET ALONG WITH OTHER PEOPLE: SOMEWHAT DIFFICULT
7. FEELING AFRAID AS IF SOMETHING AWFUL MIGHT HAPPEN: NOT AT ALL
2. NOT BEING ABLE TO STOP OR CONTROL WORRYING: NOT AT ALL
1. FEELING NERVOUS, ANXIOUS, OR ON EDGE: NOT AT ALL
5. BEING SO RESTLESS THAT IT IS HARD TO SIT STILL: NOT AT ALL

## 2023-12-06 ASSESSMENT — PATIENT HEALTH QUESTIONNAIRE - PHQ9
SUM OF ALL RESPONSES TO PHQ QUESTIONS 1-9: 4
SUM OF ALL RESPONSES TO PHQ QUESTIONS 1-9: 4
10. IF YOU CHECKED OFF ANY PROBLEMS, HOW DIFFICULT HAVE THESE PROBLEMS MADE IT FOR YOU TO DO YOUR WORK, TAKE CARE OF THINGS AT HOME, OR GET ALONG WITH OTHER PEOPLE: SOMEWHAT DIFFICULT

## 2023-12-06 NOTE — PROGRESS NOTES
Zoherh is a 48 year old who is being evaluated via a billable video visit.      How would you like to obtain your AVS? MyChart  If the video visit is dropped, the invitation should be resent by: Text to cell phone: [phone]  Will anyone else be joining your video visit? No          Assessment & Plan     Zohreh was seen today for recheck medication.    Diagnoses and all orders for this visit:    Fatigue, unspecified type  Sleep talking   - Reviewed previous rheum and ID consults. Had recommended consideration to sleep med consult in case underlying ANNE-MARIE or other sleep disorder contributing to her fatigue. Pt declines at this time, but will let me know if changes her mind and wishes to have order placed.    Anxiety  -     stable despite interval life stressors. Declines need for therapy. Continue present medication without changes.  - sertraline (ZOLOFT) 50 MG tablet; Take 1 tablet (50 mg) by mouth daily  -     hydrOXYzine HCl (ATARAX) 25 MG tablet; Take 1 tablet (25 mg) by mouth 3 times daily as needed for anxiety    Dysmenorrhea  -     very stable on OCPs continuous dosing over past 6-7 yrs. Renewed and encouraged to schedul routine physical following Feb when due next.  - norethindrone-ethinyl estradiol (JUNEL 1/20) 1-20 MG-MCG tablet; TAKE ONE TABLET BY MOUTH DAILY CONTINUOUSLY    Chronic non-seasonal allergic rhinitis, unspecified trigger  -     stable, Continue present medication. Continue annual eye exams.  - fluticasone (FLONASE) 50 MCG/ACT nasal spray; USE TWO SPRAYS IN EACH NOSTRIL EVERY DAY    Lipid screening  Hypertriglyceridemia  -     No previous elevation and last test done non-fasting so feels likely aberrant results. Will return fasting to repeat.  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 Grew-up along [facility] in [place], [place] - risk factor fo histoplasmosis infection   - worsening  "vision over past 1 yr. Has visit in Jan for recheck. Encouraged to follow-up as planned.      This patient was seen alongside a student physician assistant, TOÑITO Che. The history, physical exam, review of systems, objective data and assessment/plan were completed by myself.    Fide Vera PA-C  [facility]    Aleida Bruner is a 48 year old, presenting for the following health issues:  Recheck Medication        12/6/2023     4:09 PM   Additional Questions   Roomed by Sveta PONCE CMA       History of Present Illness       Reason for visit:  Med refills    She eats 2-3 servings of fruits and vegetables daily.She consumes 0 sweetened beverage(s) daily.She exercises with enough effort to increase her heart rate 10 to 19 minutes per day.  She exercises with enough effort to increase her heart rate 3 or less days per week.   She is taking medications regularly.    Vision continues to slowly worsen - has appt end of Jan to reassess histoplasmosis retinitis. Had PRK yrs ago for vision correction, but then 1.5 yrs ago started to wear glasses again. Over past yr has worsened further. Also suffers with ocular migraines - never has associated HA, but will develop blurry, \"wavy\" appearing of her vision below focal point. Hard for her to differentiate between ocular migraine vs histoplasmosis retinitis.    Saw rheumatology and infectious disease for chronic fatigue following mono and elevated STEVIE - evaluated in summer. Reports had incidental finding in her CT AP so additional CT chest and serum histoplasmosis which was negative. Was told to consider sleep med consult - had sleep talking, non-sensical. No known apnea or snoring, but felt need for frequent nap. Considering this, but declines referral at this time. Did gain some weight after mono and still working on getting back to her baseline.     Refill OCPs - taken for past 6-7 yrs and very stable. Continuous dosing with menses every 3 " months.    Flonase for allergic rhinitis - uses year round for seasonal and dust mites year round. Continues routine eye care.     Takes iron periodically before platelet donation. Has caused her constipation in the past so spaces it out 2-3x per week.     Anxiety Follow-Up  How are you doing with your anxiety since your last visit? Improved   Are you having other symptoms that might be associated with anxiety? No  Have you had a significant life event? OTHER: currently in the process of moving and has had a divorce and her father passed away (fall with skull fx and cranial bleed) - 3 months after marriage went sideways.    Mom sold her house this summer and moved in with her. Wants to build a house, but hasn't started this yet.  Pt decided to move in Jan as well - moving to [facility].  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 Has never been a big work socializer.  Tapered off wellbutrin in past  Continues sertraline 50mg daily.   Still has hydroxyzine on hand, but doesn't always realize until circumstances past that maybe she could have used it. Doesn't feel like happens frequently enough to warrant a change.  Had therapy for awhile (9 months) after marriage, but not for past 2 yrs now. Knows has resources through [facility] and doesn't need currently.   Echinacea in the winter months for immune support. Still getting over residual respiratory infection for past 4 weeks - evaluated and given prednisone. Hx of bronchitis.  Are you feeling depressed? No  Do you have any concerns with your use of alcohol or other drugs? No    Social History     Tobacco Use    Smoking status: Never    Smokeless tobacco: Never   Vaping Use    Vaping Use: Never used   Substance Use Topics    Alcohol use: Yes     Comment: 2-3x per week, 2-3 drinks    Drug use: No         10/8/2021     8:49 AM 5/13/2022     7:22 AM 12/6/2023     3:49 PM   CRISTINA-7 SCORE    Total Score  2 (minimal anxiety) 1 (minimal anxiety)   Total Score 4 2 1         5/13/2022     7:21 AM 12/23/2022    10:20 AM 12/6/2023     3:48 PM   PHQ   PHQ-9 Total Score 0 4 4   Q9: Thoughts of better off dead/self-harm past 2 weeks Not at all Not at all Not at all         12/6/2023     3:48 PM   Last PHQ-9   1.  Little interest or pleasure in doing things 1   2.  Feeling down, depressed, or hopeless 0   3.  Trouble falling or staying asleep, or sleeping too much 1   4.  Feeling tired or having little energy 1   5.  Poor appetite or overeating 1   6.  Feeling bad about yourself 0   7.  Trouble concentrating 0   8.  Moving slowly or restless 0   Q9: Thoughts of better off dead/self-harm past 2 weeks 0   PHQ-9 Total Score 4         12/6/2023     3:49 PM   CRISTINA-7    1. Feeling nervous, anxious, or on edge 0   2. Not being able to stop or control worrying 0   3. Worrying too much about different things 0   4. Trouble relaxing 0   5. Being so restless that it is hard to sit still 0   6. Becoming easily annoyed or irritable 1   7. Feeling afraid, as if something awful might happen 0   CRISTINA-7 Total Score 1   If you checked any problems, how difficult have they made it for you to do your work, take care of things at home, or get along with other people? Somewhat difficult         Review of Systems   Constitutional, HEENT, cardiovascular, pulmonary, gi and gu systems are negative, except as otherwise noted.      Objective           Vitals:  No vitals were obtained today due to virtual visit.    Physical Exam   GENERAL: Healthy, alert and no distress  EYES: Eyes grossly normal to inspection.  No discharge or erythema, or obvious scleral/conjunctival abnormalities.  RESP: No audible wheeze or visible cyanosis. Periodic cough. No visible retractions or increased work of breathing.    SKIN: Visible skin clear. No significant rash, abnormal pigmentation or lesions.  NEURO: Cranial nerves grossly intact.  Mentation and speech  appropriate for age.  PSYCH: Mentation appears normal, affect normal/bright, judgement and insight intact, normal speech and appearance well-groomed.              Video-Visit Details    Type of service:  Video Visit     Originating Location (pt. Location): Other car    Distant Location (provider location):  On-site  Platform used for Video Visit: Skylines

## 2024-01-03 ENCOUNTER — PATIENT OUTREACH (OUTPATIENT)
Dept: CARE COORDINATION | Facility: CLINIC | Age: 49
End: 2024-01-03
Payer: COMMERCIAL

## 2024-01-16 ENCOUNTER — E-VISIT (OUTPATIENT)
Dept: FAMILY MEDICINE | Facility: CLINIC | Age: 49
End: 2024-01-16
Payer: COMMERCIAL

## 2024-01-16 DIAGNOSIS — U07.1 INFECTION DUE TO 2019 NOVEL CORONAVIRUS: Primary | ICD-10-CM

## 2024-01-16 PROCEDURE — 99421 OL DIG E/M SVC 5-10 MIN: CPT | Performed by: FAMILY MEDICINE

## 2024-01-16 NOTE — PATIENT INSTRUCTIONS
For informational purposes only. Not to replace the advice of your health care provider. Copyright   2022 [facility]. All rights reserved. Clinically reviewed by Tracie Reis, PharmD, BCACP. Softricity 221210 - REV 06/23.  COVID-19 Outpatient Treatments  Your care team can help you find the best treatments for COVID-19. Talk to a health care provider or refer to the FDA medicine fact sheets below.  Paxlovid (nirmatrelvir and ritonavir): https://www.paxlovid.[employer]/resources  Molnupiravir (Lagevrio): https://www.fda.gov/media/021796/download  Important: We can only prescribe Paxlovid or Molnupiravir when it can be started within 5 days of first having symptoms.  Paxlovid (nimatrelvir and ritonavir)  How it works  Two medicines (nirmatrelvir and ritonavir) are taken together. They stop the virus from growing. Less amount of virus is easier for your body to fight.  Benefits  Lowers risk of a hospital stay or death from COVID-19.  How to take  Medicine comes in a daily container with both medicine tablets. Take by mouth twice daily (once in the morning, once at night) for 5 days.  The number of tablets to take varies by patient.  Don't chew or break capsules. Swallow whole.  When to take  Take it as soon as possible and within 5 days of your first symptoms.  Who can take it  Patients must be 12 years or older weigh at least 88 pounds. Paxlovid is the preferred treatment for pregnant patients.  Possible side effects  Can cause altered sense of taste, diarrhea (loose, watery stools), high blood pressure, muscle aches.  Medicine conflicts  Some medicines may conflict with Paxlovid and may cause serious side effects.  Tell your care team about all the medicines you take, including prescription and over-the-counter medicines, vitamins, and herbal supplements.  Your care team will review your medicines to make sure that you can safely take Paxlovid.  Cautions  Paxlovid is not advised for patients with severe  kidney or liver disease. If you have kidney or liver problems, the dose may need to be changed.  If you're pregnant or breastfeeding, talk to your care team about your options.  If you take hormonal birth control (such as the Pill), then you or your partner should also use a non-hormonal form of birth control (such as a condom). Keep doing this for 1 menstrual cycle after your last dose of Paxlovid.  Molnupiravir (lagevrio)  How it works  Stops the virus from growing. Less amount of virus is easier for your body to fight.  Benefits  Lowers risk of a hospital stay or death from COVID-19.  How to take  Take 4 capsules by mouth every 12 hours (4 in the morning and 4 at night) for 5 days. Don't chew or break capsules. Swallow whole.  When to take  Take as soon as possible and within 5 days of your first symptoms.  Who can take it  Patients must be 18 years or older.   Possible side effects  Diarrhea (loose, watery stools), nausea (feeling sick to your stomach), dizziness, headaches.  Medicine conflicts  Right now, there are no known conflicts with other drugs. But tell your care team about all medicines you take.  Cautions  This medicine is not advised for patients who are pregnant.  If you are someone who could become pregnant, use trusted birth control until 4 days after your last dose of molnupiravir.  If your partner could become pregnant, you should use trusted birth control until 3 months after your last dose of molnupiravir.      Coping with Life After COVID-19  Being in the hospital because of COVID-19 is scary. Going home can be scary, too. You may face changes to your life, the way you work or what you can eat. It s hard to adjust to change, and it s normal to feel afraid, frustrated or even angry. These feelings usually go away over time. If your feelings don t start to get better, it s called  adjustment disorder.      What signs should I look out for?  Adjustment disorder can happen to anyone in a time of  stress. It makes it hard to cope with daily life. You may feel lonely or fight with loved ones, even if you re glad to be home. Watch for these signs:  Fear or worry  Hard time focusing  Sadness or anger  Trouble talking to family or friends  Feeling like you don t fit in or isolating yourself  Problems with sleep   Drinking alcohol or taking drugs to cope    What can I do?  You can help yourself get better. Feeling you have control helps you move forward. You may wonder if you ll be able to do things you did before. Be patient. Do your best to make the most of every day. Try to build relationships, be as active as you can, eat right and keep a sense of humor. Avoid smoking and drinking too much alcohol. Call your family doctor or clinic if you re not sure what to do. They can guide you to care or other services.    When should I get help?  Think about getting counseling if your sadness or frustration gets worse. Together with a trained counselor, you can talk about your problems adjusting to life after your hospital stay. You can come up with new ways to handle changes that give you more control. Your family doctor or care team can help you find a counselor.     Get help if you re thinking about hurting yourself. If you need help right away, call 911 or go to the nearest emergency room. You can also try the Crisis Text Line.    Crisis Text Line: 867-997 (http://www.crisistextline.org)  The Crisis Text Line serves anyone, in any crisis. It gives free, 24/7 support. Here's how it works:  Text HOME to 064011 from anywhere in the [place], anytime, about any type of crisis.  A live, trained Crisis Counselor will text you back quickly.  The volunteer Crisis Counselor can help you move from a  hot moment  to a  cool moment.  They can also help you work out a safety plan.

## 2024-01-16 NOTE — LETTER
January 23, 2024      Laure Mendez  [address] [place] [address]        To Whom It May Concern:    Laure Mendez was seen in our clinic. She was diagnosed with COVID-19 on 1/15/2024. Due to ongoing symptoms, advise she complete her full 10 day quarantine at home before returning to work. She may return to work on 1/26/2024 without restrictions as long as she feels like symptoms are improving and she is fever free. Please reach out if you have any additional questions or concerns.      Sincerely,        Yeison Norton, DO

## 2024-01-22 ENCOUNTER — PATIENT OUTREACH (OUTPATIENT)
Dept: CARE COORDINATION | Facility: CLINIC | Age: 49
End: 2024-01-22
Payer: COMMERCIAL

## 2024-01-25 ENCOUNTER — TELEPHONE (OUTPATIENT)
Dept: FAMILY MEDICINE | Facility: CLINIC | Age: 49
End: 2024-01-25
Payer: COMMERCIAL

## 2024-01-25 NOTE — TELEPHONE ENCOUNTER
Patient calls to report she needs FMLA forms filled due to being out of work for COVID illness. Patient reports her work is needing FMLA forms and they aren't able to accept the work note given to her by Dr. Fraser during 1/16/2024 evisit.     Patient had evisit on 1/16/24 to get treatment for COVID medication. Patient was out from work from 1/16/2024 until 1/25/2024. Patient will return to work tomorrow, 1/26/2024.     Patient will be sending FMLA paperwork to [facility] for Dr. Norton to fill out. Provided patient with fax number - [phone].    Thank you,  Anoop Rowland, Triage RN [place]  10:03 AM 1/25/2024

## 2024-01-26 ENCOUNTER — TELEPHONE (OUTPATIENT)
Dept: FAMILY MEDICINE | Facility: CLINIC | Age: 49
End: 2024-01-26
Payer: COMMERCIAL

## 2024-01-26 NOTE — TELEPHONE ENCOUNTER
Forms/Letter Request    [facility]    Do we have the form/letter: Yes:     Who is the form from? Insurance comp    Where did/will the form come from? form was faxed in    When is form/letter needed by: asap    How would you like the form/letter returned: Fax : [phone]    Patient Notified form requests are processed in 5-7 business days:No    Could we send this information to you in [employer] or would you prefer to receive a phone call?:   na    Put in providers in box     Pamela K

## 2024-01-30 NOTE — TELEPHONE ENCOUNTER
Faxed signed form to [employer]  Dept of Labor Wage and Hour division.#[phone]    FMLA    Copied to [employer] / Filed In [place] / Pamela ZHENG

## 2024-01-31 NOTE — TELEPHONE ENCOUNTER
"Patient calls back stating this form was not filled out correctly. Patient states there is a section where \"additional treatment\" was checked no. Patient states it needs to be checked yes, and Paxlovid needs to be listed. Patient's claim was denied due to this.    Please call patient once this has been re-faxed.   "

## 2024-01-31 NOTE — TELEPHONE ENCOUNTER
Patient's form updated to include Paxlovid treatment. Placed back in TC basket.    Yeison Norton DO  1/31/2024 1:06 PM

## 2024-01-31 NOTE — TELEPHONE ENCOUNTER
Provider gave paperwork back to refax after more information was input on forms.  Faxed to #[phone].     Ran through [employer] again and filed in [place] / Pamela ZHENG

## 2024-02-05 ENCOUNTER — PATIENT OUTREACH (OUTPATIENT)
Dept: CARE COORDINATION | Facility: CLINIC | Age: 49
End: 2024-02-05
Payer: COMMERCIAL

## 2024-03-06 SDOH — HEALTH STABILITY: PHYSICAL HEALTH: ON AVERAGE, HOW MANY DAYS PER WEEK DO YOU ENGAGE IN MODERATE TO STRENUOUS EXERCISE (LIKE A BRISK WALK)?: 1 DAY

## 2024-03-06 SDOH — HEALTH STABILITY: PHYSICAL HEALTH: ON AVERAGE, HOW MANY MINUTES DO YOU ENGAGE IN EXERCISE AT THIS LEVEL?: 20 MIN

## 2024-03-06 ASSESSMENT — SOCIAL DETERMINANTS OF HEALTH (SDOH)
IN A TYPICAL WEEK, HOW MANY TIMES DO YOU TALK ON THE PHONE WITH FAMILY, FRIENDS, OR NEIGHBORS?: THREE TIMES A WEEK
HOW OFTEN DO YOU ATTEND CHURCH OR RELIGIOUS SERVICES?: 1 TO 4 TIMES PER YEAR
HOW OFTEN DO YOU ATTENT MEETINGS OF THE CLUB OR ORGANIZATION YOU BELONG TO?: MORE THAN 4 TIMES PER YEAR
ARE YOU MARRIED, WIDOWED, DIVORCED, SEPARATED, NEVER MARRIED, OR LIVING WITH A PARTNER?: LIVING WITH PARTNER
DO YOU BELONG TO ANY CLUBS OR ORGANIZATIONS SUCH AS CHURCH GROUPS UNIONS, FRATERNAL OR ATHLETIC GROUPS, OR SCHOOL GROUPS?: YES
HOW OFTEN DO YOU GET TOGETHER WITH FRIENDS OR RELATIVES?: TWICE A WEEK
HOW OFTEN DO YOU GET TOGETHER WITH FRIENDS OR RELATIVES?: TWICE A WEEK

## 2024-03-06 ASSESSMENT — LIFESTYLE VARIABLES
HOW MANY STANDARD DRINKS CONTAINING ALCOHOL DO YOU HAVE ON A TYPICAL DAY: 3 OR 4
AUDIT-C TOTAL SCORE: 5
HOW OFTEN DO YOU HAVE SIX OR MORE DRINKS ON ONE OCCASION: LESS THAN MONTHLY
HOW OFTEN DO YOU HAVE A DRINK CONTAINING ALCOHOL: 2-3 TIMES A WEEK
SKIP TO QUESTIONS 9-10: 0

## 2024-03-13 ENCOUNTER — ANCILLARY PROCEDURE (OUTPATIENT)
Dept: GENERAL RADIOLOGY | Facility: CLINIC | Age: 49
End: 2024-03-13
Attending: NURSE PRACTITIONER
Payer: COMMERCIAL

## 2024-03-13 ENCOUNTER — OFFICE VISIT (OUTPATIENT)
Dept: FAMILY MEDICINE | Facility: CLINIC | Age: 49
End: 2024-03-13
Payer: COMMERCIAL

## 2024-03-13 VITALS
HEIGHT: 65 IN | HEART RATE: 73 BPM | TEMPERATURE: 98.6 F | RESPIRATION RATE: 16 BRPM | OXYGEN SATURATION: 98 % | WEIGHT: 197 LBS | DIASTOLIC BLOOD PRESSURE: 88 MMHG | BODY MASS INDEX: 32.82 KG/M2 | SYSTOLIC BLOOD PRESSURE: 122 MMHG

## 2024-03-13 DIAGNOSIS — E78.1 HYPERTRIGLYCERIDEMIA: ICD-10-CM

## 2024-03-13 DIAGNOSIS — N28.0 RENAL INFARCT (H): ICD-10-CM

## 2024-03-13 DIAGNOSIS — S99.922A FOOT INJURY, LEFT, INITIAL ENCOUNTER: ICD-10-CM

## 2024-03-13 DIAGNOSIS — F33.42 RECURRENT MAJOR DEPRESSIVE DISORDER, IN FULL REMISSION (H): ICD-10-CM

## 2024-03-13 DIAGNOSIS — Z00.00 ROUTINE GENERAL MEDICAL EXAMINATION AT A HEALTH CARE FACILITY: Primary | ICD-10-CM

## 2024-03-13 PROCEDURE — 90471 IMMUNIZATION ADMIN: CPT | Performed by: NURSE PRACTITIONER

## 2024-03-13 PROCEDURE — 99396 PREV VISIT EST AGE 40-64: CPT | Mod: 25 | Performed by: NURSE PRACTITIONER

## 2024-03-13 PROCEDURE — 73630 X-RAY EXAM OF FOOT: CPT | Mod: TC | Performed by: RADIOLOGY

## 2024-03-13 PROCEDURE — 99213 OFFICE O/P EST LOW 20 MIN: CPT | Mod: 25 | Performed by: NURSE PRACTITIONER

## 2024-03-13 PROCEDURE — 90715 TDAP VACCINE 7 YRS/> IM: CPT | Performed by: NURSE PRACTITIONER

## 2024-03-13 ASSESSMENT — PAIN SCALES - GENERAL: PAINLEVEL: SEVERE PAIN (6)

## 2024-03-13 NOTE — PROGRESS NOTES
"Preventive Care Visit  [facility]  Padmini Mejia NP, Family Medicine  Mar 13, 2024      Assessment & Plan     Renal infarct (H24)  Monitor, stable    Recurrent major depressive disorder, in full remission (H24)  Monitor, stable    Hypertriglyceridemia  Check labs  - Lipid panel reflex to direct LDL Fasting    Routine general medical examination at a health care facility  completed  - CBC with platelets  - Comprehensive metabolic panel (BMP + Alb, Alk Phos, ALT, AST, Total. Bili, TP)    Foot injury, left, initial encounter  New, will get xray to eval for fracture, if negative RICE at home  - XR Foot Left G/E 3 Views    BMI  Estimated body mass index is 32.53 kg/m  as calculated from the following:    Height as of this encounter: 1.657 m (5' 5.25\").    Weight as of this encounter: 89.4 kg (197 lb).     Counseling  Appropriate preventive services were discussed with this patient, including applicable screening as appropriate for fall prevention, nutrition, physical activity, Tobacco-use cessation, weight loss and cognition.  Checklist reviewing preventive services available has been given to the patient.  Reviewed patient's diet, addressing concerns and/or questions.   She is at risk for lack of exercise and has been provided with information to increase physical activity for the benefit of her well-being.   She is at risk for psychosocial distress and has been provided with information to reduce risk.   The patient reports drinking more than one alcoholic drink per day and sometimes engages in binge or excessive drinking. The patient was counseled and given information about possible harmful effects of excessive alcohol intake as well as where to get help for alcohol problems.         Aleida Bruner is a 48 year old, presenting for the following:  Physical (Here today for her Routine Medical Exam. Non-Fasting. ) and Foot Pain (Dropped something on her left foot and then stepped down " forcefully on it. Wondering about an xray.)        3/13/2024    12:53 PM   Additional Questions   Roomed by Lubna Lal CMA   Accompanied by Self        Health Care Directive  Patient does not have a Health Care Directive or Living Will: Discussed advance care planning with patient; however, patient declined at this time.    HPI    She hurt her foot, she is moving and she might have dropped something on it and or when she stepped down very hard off a ladder. She does have some bruising up into her leg.     He last cholesterol level she was not fasting and she was not supposed to get this done.     She just had covid and she did have mono a year ago and she did have hepatotoxicity and splenomegaly and she is finally feels that she is getting her lung capacity back.         3/6/2024   General Health   How would you rate your overall physical health? Excellent   Feel stress (tense, anxious, or unable to sleep) Only a little    Only a little   (!) STRESS CONCERN      3/6/2024   Nutrition   Three or more servings of calcium each day? Yes   Diet: Regular (no restrictions)   How many servings of fruit and vegetables per day? (!) 2-3   How many sweetened beverages each day? 0-1         3/6/2024   Exercise   Days per week of moderate/strenous exercise 1 day    1 day   Average minutes spent exercising at this level 20 min    20 min   (!) EXERCISE CONCERN      3/6/2024   Social Factors   Frequency of gathering with friends or relatives Twice a week    Twice a week   Worry food won't last until get money to buy more No    No   Food not last or not have enough money for food? No    No   Do you have housing?  Yes    Yes   Are you worried about losing your housing? No    No   Lack of transportation? No    No   Unable to get utilities (heat,electricity)? No    No         3/6/2024   Dental   Dentist two times every year? Yes         3/6/2024   TB Screening   Were you born outside of [place]?  No         Today's PHQ-9 Score:        12/6/2023     3:48 PM   PHQ-9 SCORE   PHQ-9 Total Score MyChart 4 (Minimal depression)   PHQ-9 Total Score 4           3/6/2024   Substance Use   Frequency of drinking alcohol? 2-3 times a week   Alcohol more than 3/day or more than 7/wk Yes   How often do you have a drink containing alcohol 2 to 3 times a week   How many alcohol drinks on typical day 3 or 4   How often do you have 5+ drinks at one occasion Monthly   Audit 2/3 Score 3   How often not able to stop drinking once started Never   How often failed to do what normally expected Never   How often needed first drink in am after a heavy drinking session Never   How often feeling of guilt or remorse after drinking Less than monthly   How often unable to remember what happened the night before Less than monthly   Have you or someone else been injured because of your drinking No   Has anyone been concerned or suggested you cut down on drinking No   TOTAL SCORE - AUDIT 8   Do you use any other substances recreationally? No     Social History     Tobacco Use    Smoking status: Never    Smokeless tobacco: Never   Vaping Use    Vaping Use: Never used   Substance Use Topics    Alcohol use: Yes     Comment: 2-3 per day ave    Drug use: No             3/6/2024   Breast Cancer Screening   Family history of breast, colon, or ovarian cancer? No / Unknown         2/2/2023   LAST FHS-7 RESULTS   1st degree relative breast or ovarian cancer No   Any relative bilateral breast cancer No   Any male have breast cancer No   Any ONE woman have BOTH breast AND ovarian cancer No   Any woman with breast cancer before 50yrs No   2 or more relatives with breast AND/OR ovarian cancer Yes   2 or more relatives with breast AND/OR bowel cancer No        Mammogram Screening - Mammogram every 1-2 years updated in Health Maintenance based on mutual decision making        3/6/2024   STI Screening   New sexual partner(s) since last STI/HIV test? No     History of abnormal Pap smear: NO - age  30-65 PAP every 5 years with negative HPV co-testing recommended        Latest Ref Rng & Units 2019     5:12 PM 2019     5:00 PM 2016     4:09 PM   PAP / HPV   PAP (Historical)  NIL      HPV 16 DNA NEG^Negative  Negative  Negative    HPV 18 DNA NEG^Negative  Negative  Negative    Other HR HPV NEG^Negative  Negative  Negative      ASCVD Risk   The 10-year ASCVD risk score (Godfrey SANTOS, et al., 2019) is: 1.1%    Values used to calculate the score:      Age: 48 years      Sex: Female      Is Non- : No      Diabetic: No      Tobacco smoker: No      Systolic Blood Pressure: 122 mmHg      Is BP treated: No      HDL Cholesterol: 71 mg/dL      Total Cholesterol: 291 mg/dL        3/6/2024   Contraception/Family Planning   Questions about contraception or family planning No        Reviewed and updated as needed this visit by Provider     Meds                Past Medical History:   Diagnosis Date    Acute laryngitis 2015    Allergic rhinitis 10/14/2003     Problem list name updated by automated process. Provider to review    Allergic rhinitis, cause unspecified     year round    Anxiety 2011    ASCUS on Pap smear     ASCUS     Past Surgical History:   Procedure Laterality Date    BIOPSY      Excision Right lateral neck lymph node    COLONOSCOPY N/A 2022    Procedure: COLONOSCOPY (FV);  Surgeon: Dennise Orozco MD;  Location:  GI    ENT SURGERY      Tonsillectomy/Adnoidectomy    EYE SURGERY      PRK vision repair, bilateral    HEAD & NECK SURGERY      Extraction wisdom teeth    MOUTH SURGERY      SOFT TISSUE SURGERY      ZZC NONSPECIFIC PROCEDURE      wisdom teeth    ZZC NONSPECIFIC PROCEDURE      cervical Lymph node bx-related to tonsils    ZZC NONSPECIFIC PROCEDURE      Tonsillectomy    ZZC NONSPECIFIC PROCEDURE      colposcopy s/p ASCUS     OB History    Para Term  AB Living   0 0 0 0 0 0   SAB IAB Ectopic Multiple Live  "Births   0 0 0 0 0         Review of Systems  Constitutional, HEENT, cardiovascular, pulmonary, gi and gu systems are negative, except as otherwise noted.     Objective    Exam  /88 (BP Location: Right arm, Patient Position: Sitting, Cuff Size: Adult Regular)   Pulse 73   Temp 98.6  F (37  C) (Oral)   Resp 16   Ht 1.657 m (5' 5.25\")   Wt 89.4 kg (197 lb)   LMP 02/17/2024 (Exact Date)   SpO2 98%   BMI 32.53 kg/m     Estimated body mass index is 32.53 kg/m  as calculated from the following:    Height as of this encounter: 1.657 m (5' 5.25\").    Weight as of this encounter: 89.4 kg (197 lb).    Physical Exam  GENERAL: alert and no distress  EYES: Eyes grossly normal to inspection, PERRL and conjunctivae and sclerae normal  NECK: no adenopathy, no asymmetry, masses, or scars  RESP: lungs clear to auscultation - no rales, rhonchi or wheezes  CV: regular rate and rhythm, normal S1 S2, no S3 or S4, no murmur, click or rub, no peripheral edema  ABDOMEN: soft, nontender, no hepatosplenomegaly, no masses and bowel sounds normal  MS: no gross musculoskeletal defects noted, no edema  SKIN: no suspicious lesions or rashes  NEURO: Normal strength and tone, mentation intact and speech normal        Signed Electronically by: Padmini Mejia NP    "

## 2024-03-19 ENCOUNTER — MYC MEDICAL ADVICE (OUTPATIENT)
Dept: FAMILY MEDICINE | Facility: CLINIC | Age: 49
End: 2024-03-19
Payer: COMMERCIAL

## 2024-04-16 ENCOUNTER — LAB (OUTPATIENT)
Dept: LAB | Facility: CLINIC | Age: 49
End: 2024-04-16
Payer: COMMERCIAL

## 2024-04-16 DIAGNOSIS — E78.1 HYPERTRIGLYCERIDEMIA: ICD-10-CM

## 2024-04-16 DIAGNOSIS — Z00.00 ROUTINE GENERAL MEDICAL EXAMINATION AT A HEALTH CARE FACILITY: ICD-10-CM

## 2024-04-16 LAB
ALBUMIN SERPL BCG-MCNC: 4.3 G/DL (ref 3.5–5.2)
ALP SERPL-CCNC: 68 U/L (ref 40–150)
ALT SERPL W P-5'-P-CCNC: 14 U/L (ref 0–50)
ANION GAP SERPL CALCULATED.3IONS-SCNC: 12 MMOL/L (ref 7–15)
AST SERPL W P-5'-P-CCNC: 22 U/L (ref 0–45)
BILIRUB SERPL-MCNC: 0.4 MG/DL
BUN SERPL-MCNC: 12.8 MG/DL (ref 6–20)
CALCIUM SERPL-MCNC: 8.9 MG/DL (ref 8.6–10)
CHLORIDE SERPL-SCNC: 105 MMOL/L (ref 98–107)
CHOLEST SERPL-MCNC: 231 MG/DL
CREAT SERPL-MCNC: 0.82 MG/DL (ref 0.51–0.95)
DEPRECATED HCO3 PLAS-SCNC: 20 MMOL/L (ref 22–29)
EGFRCR SERPLBLD CKD-EPI 2021: 88 ML/MIN/1.73M2
ERYTHROCYTE [DISTWIDTH] IN BLOOD BY AUTOMATED COUNT: 12.9 % (ref 10–15)
FASTING STATUS PATIENT QL REPORTED: YES
GLUCOSE SERPL-MCNC: 103 MG/DL (ref 70–99)
HCT VFR BLD AUTO: 38.4 % (ref 35–47)
HDLC SERPL-MCNC: 61 MG/DL
HGB BLD-MCNC: 12.7 G/DL (ref 11.7–15.7)
LDLC SERPL CALC-MCNC: 153 MG/DL
MCH RBC QN AUTO: 29.5 PG (ref 26.5–33)
MCHC RBC AUTO-ENTMCNC: 33.1 G/DL (ref 31.5–36.5)
MCV RBC AUTO: 89 FL (ref 78–100)
NONHDLC SERPL-MCNC: 170 MG/DL
PLATELET # BLD AUTO: 340 10E3/UL (ref 150–450)
POTASSIUM SERPL-SCNC: 4.3 MMOL/L (ref 3.4–5.3)
PROT SERPL-MCNC: 7.1 G/DL (ref 6.4–8.3)
RBC # BLD AUTO: 4.3 10E6/UL (ref 3.8–5.2)
SODIUM SERPL-SCNC: 137 MMOL/L (ref 135–145)
TRIGL SERPL-MCNC: 83 MG/DL
WBC # BLD AUTO: 7.7 10E3/UL (ref 4–11)

## 2024-04-16 PROCEDURE — 36415 COLL VENOUS BLD VENIPUNCTURE: CPT

## 2024-04-16 PROCEDURE — 85027 COMPLETE CBC AUTOMATED: CPT

## 2024-04-16 PROCEDURE — 80061 LIPID PANEL: CPT

## 2024-04-16 PROCEDURE — 80053 COMPREHEN METABOLIC PANEL: CPT

## 2024-04-17 NOTE — RESULT ENCOUNTER NOTE
Dear Zohreh,     -LDL(bad) cholesterol level is elevated (but improved from when this was last checked) which can increase your heart disease risk.  A diet high in fat and simple carbohydrates, genetics and being overweight can contribute to this. ADVISE: exercising 150 minutes of aerobic exercise per week (30 minutes for 5 days per week or 50 minutes for 3 days per week are options) and eating a low saturated fat/low carbohydrate diet are helpful to improve this. In 12 months, you should recheck your fasting cholesterol panel.    Thank you,     Lacy Miller, APRN, CNP  [facility]

## 2024-12-12 SDOH — HEALTH STABILITY: PHYSICAL HEALTH: ON AVERAGE, HOW MANY MINUTES DO YOU ENGAGE IN EXERCISE AT THIS LEVEL?: 30 MIN

## 2024-12-12 SDOH — HEALTH STABILITY: PHYSICAL HEALTH: ON AVERAGE, HOW MANY DAYS PER WEEK DO YOU ENGAGE IN MODERATE TO STRENUOUS EXERCISE (LIKE A BRISK WALK)?: 2 DAYS

## 2024-12-12 ASSESSMENT — SOCIAL DETERMINANTS OF HEALTH (SDOH): HOW OFTEN DO YOU GET TOGETHER WITH FRIENDS OR RELATIVES?: ONCE A WEEK

## 2024-12-16 ASSESSMENT — PATIENT HEALTH QUESTIONNAIRE - PHQ9
SUM OF ALL RESPONSES TO PHQ QUESTIONS 1-9: 3
10. IF YOU CHECKED OFF ANY PROBLEMS, HOW DIFFICULT HAVE THESE PROBLEMS MADE IT FOR YOU TO DO YOUR WORK, TAKE CARE OF THINGS AT HOME, OR GET ALONG WITH OTHER PEOPLE: NOT DIFFICULT AT ALL
SUM OF ALL RESPONSES TO PHQ QUESTIONS 1-9: 3

## 2024-12-17 ENCOUNTER — OFFICE VISIT (OUTPATIENT)
Dept: FAMILY MEDICINE | Facility: CLINIC | Age: 49
End: 2024-12-17
Payer: COMMERCIAL

## 2024-12-17 VITALS
DIASTOLIC BLOOD PRESSURE: 86 MMHG | RESPIRATION RATE: 16 BRPM | SYSTOLIC BLOOD PRESSURE: 126 MMHG | TEMPERATURE: 97.1 F | BODY MASS INDEX: 33.12 KG/M2 | WEIGHT: 198.8 LBS | HEART RATE: 58 BPM | HEIGHT: 65 IN | OXYGEN SATURATION: 99 %

## 2024-12-17 DIAGNOSIS — L98.9 SKIN LESION: ICD-10-CM

## 2024-12-17 DIAGNOSIS — F41.9 ANXIETY: ICD-10-CM

## 2024-12-17 DIAGNOSIS — Z12.31 ENCOUNTER FOR SCREENING MAMMOGRAM FOR MALIGNANT NEOPLASM OF BREAST: ICD-10-CM

## 2024-12-17 DIAGNOSIS — J30.89 CHRONIC NON-SEASONAL ALLERGIC RHINITIS: ICD-10-CM

## 2024-12-17 DIAGNOSIS — N94.6 DYSMENORRHEA: Primary | ICD-10-CM

## 2024-12-17 PROCEDURE — 99214 OFFICE O/P EST MOD 30 MIN: CPT | Performed by: NURSE PRACTITIONER

## 2024-12-17 RX ORDER — NORETHINDRONE ACETATE AND ETHINYL ESTRADIOL .02; 1 MG/1; MG/1
TABLET ORAL
Qty: 84 TABLET | Refills: 3 | Status: SHIPPED | OUTPATIENT
Start: 2024-12-17

## 2024-12-17 RX ORDER — FLUTICASONE PROPIONATE 50 MCG
SPRAY, SUSPENSION (ML) NASAL
Qty: 48 G | Refills: 3 | Status: SHIPPED | OUTPATIENT
Start: 2024-12-17

## 2024-12-17 NOTE — PROGRESS NOTES
"Assessment & Plan     Dysmenorrhea  Stable on medication, no period on OCP at this time.   - norethindrone-ethinyl estradiol (JUNEL 1/20) 1-20 MG-MCG tablet  Dispense: 84 tablet; Refill: 3    Anxiety  Stable on regimen.   - sertraline (ZOLOFT) 50 MG tablet  Dispense: 90 tablet; Refill: 3    Chronic non-seasonal allergic rhinitis, unspecified trigger  - fluticasone (FLONASE) 50 MCG/ACT nasal spray  Dispense: 48 g; Refill: 3    Skin lesion  Keeps returning and scabbing. Recommend derm eval and skin check.   - Adult Dermatology  Referral    Encounter for screening mammogram for malignant neoplasm of breast  - MA Screening Bilateral w/ Jose      Patient has been advised of split billing requirements and indicates understanding: Yes        BMI  Estimated body mass index is 32.83 kg/m  as calculated from the following:    Height as of this encounter: 1.657 m (5' 5.25\").    Weight as of this encounter: 90.2 kg (198 lb 12.8 oz).   Weight management plan: Discussed healthy diet and exercise guidelines    Counseling  Appropriate preventive services were addressed with this patient via screening, questionnaire, or discussion as appropriate for fall prevention, nutrition, physical activity, Tobacco-use cessation, social engagement, weight loss and cognition.  Checklist reviewing preventive services available has been given to the patient.  Reviewed patient's diet, addressing concerns and/or questions.   She is at risk for lack of exercise and has been provided with information to increase physical activity for the benefit of her well-being.   The patient reports drinking more than 3 alcoholic drinks per day and/or more than 7 drhnks per week. The patient was counseled and given information about possible harmful effects of excessive alcohol intake.    See Patient Instructions    Aleida Bruner is a 49 year old, presenting for the following health issues:  Recheck Medication        12/17/2024     1:59 PM "   Additional Questions   Roomed by Jocelyn DAMON CMA   Accompanied by self     History of Present Illness       Reason for visit:  Skin lesion  Symptom onset:  More than a month  Symptoms include:  Itchy spot on back  Symptom intensity:  Mild  Symptom progression:  Staying the same  Had these symptoms before:  Yes  Has tried/received treatment for these symptoms:  No  What makes it worse:  -  What makes it better:  -   She is taking medications regularly.     Coming and going, itchy. Once she itches it will get red and then    Depression and Anxiety   How are you doing with your depression since your last visit? Improved stress level is a little less. Settled in after moving.  How are you doing with your anxiety since your last visit?  Improved   Are you having other symptoms that might be associated with depression or anxiety? No  Have you had a significant life event? No   Do you have any concerns with your use of alcohol or other drugs? No    Social History     Tobacco Use    Smoking status: Never    Smokeless tobacco: Never   Vaping Use    Vaping status: Never Used   Substance Use Topics    Alcohol use: Yes     Comment: 2-3 per day ave    Drug use: No         12/23/2022    10:20 AM 12/6/2023     3:48 PM 12/16/2024     4:19 PM   PHQ   PHQ-9 Total Score 4 4 3    Q9: Thoughts of better off dead/self-harm past 2 weeks Not at all  Not at all  Not at all        Patient-reported         10/8/2021     8:49 AM 5/13/2022     7:22 AM 12/6/2023     3:49 PM   CRISTINA-7 SCORE   Total Score  2 (minimal anxiety) 1 (minimal anxiety)   Total Score 4 2 1         12/16/2024     4:19 PM   Last PHQ-9   1.  Little interest or pleasure in doing things 0    2.  Feeling down, depressed, or hopeless 0    3.  Trouble falling or staying asleep, or sleeping too much 1    4.  Feeling tired or having little energy 1    5.  Poor appetite or overeating 1    6.  Feeling bad about yourself 0    7.  Trouble concentrating 0    8.  Moving slowly or restless 0   "  Q9: Thoughts of better off dead/self-harm past 2 weeks 0    PHQ-9 Total Score 3        Patient-reported         12/6/2023     3:49 PM   CRISTINA-7    1. Feeling nervous, anxious, or on edge 0    2. Not being able to stop or control worrying 0    3. Worrying too much about different things 0    4. Trouble relaxing 0    5. Being so restless that it is hard to sit still 0    6. Becoming easily annoyed or irritable 1    7. Feeling afraid, as if something awful might happen 0    CRISTINA-7 Total Score 1   If you checked any problems, how difficult have they made it for you to do your work, take care of things at home, or get along with other people? Somewhat difficult        Patient-reported       Suicide Assessment Five-step Evaluation and Treatment (SAFE-T)          Review of Systems  Constitutional, HEENT, cardiovascular, pulmonary, GI, , musculoskeletal, neuro, skin, endocrine and psych systems are negative, except as otherwise noted.      Objective    /86   Pulse 58   Temp 97.1  F (36.2  C) (Tympanic)   Resp 16   Ht 1.657 m (5' 5.25\")   Wt 90.2 kg (198 lb 12.8 oz)   SpO2 99%   BMI 32.83 kg/m    Body mass index is 32.83 kg/m .  Physical Exam   GENERAL: alert and no distress  EYES: Eyes grossly normal to inspection, PERRL and conjunctivae and sclerae normal  HENT: ear canals and TM's normal, nose and mouth without ulcers or lesions  NECK: no adenopathy, no asymmetry, masses, or scars  RESP: lungs clear to auscultation - no rales, rhonchi or wheezes  CV: regular rate and rhythm, normal S1 S2, no S3 or S4, no murmur, click or rub, no peripheral edema  ABDOMEN: soft, nontender, no hepatosplenomegaly, no masses and bowel sounds normal  MS: no gross musculoskeletal defects noted, no edema  SKIN: no suspicious lesions or rashes  NEURO: Normal strength and tone, mentation intact and speech normal  PSYCH: mentation appears normal, affect normal/bright            Signed Electronically by: Elise Solomon, CNP    "

## 2024-12-18 ENCOUNTER — PATIENT OUTREACH (OUTPATIENT)
Dept: CARE COORDINATION | Facility: CLINIC | Age: 49
End: 2024-12-18
Payer: COMMERCIAL

## 2025-04-19 ENCOUNTER — HEALTH MAINTENANCE LETTER (OUTPATIENT)
Age: 50
End: 2025-04-19

## (undated) DEVICE — CLIP HEMOSTASIS ASSURANCE W16 MM BX00711884

## (undated) DEVICE — KIT ENDO TURNOVER/PROCEDURE W/CLEAN A SCOPE LINERS 103888

## (undated) DEVICE — RX ELEVIEW SUBMUCOSAL ING 10ML AMP 05391530190015

## (undated) RX ORDER — SIMETHICONE 40MG/0.6ML
SUSPENSION, DROPS(FINAL DOSAGE FORM)(ML) ORAL
Status: DISPENSED
Start: 2022-09-19

## (undated) RX ORDER — FENTANYL CITRATE 0.05 MG/ML
INJECTION, SOLUTION INTRAMUSCULAR; INTRAVENOUS
Status: DISPENSED
Start: 2022-09-19